# Patient Record
Sex: FEMALE | Race: WHITE | Employment: OTHER | URBAN - METROPOLITAN AREA
[De-identification: names, ages, dates, MRNs, and addresses within clinical notes are randomized per-mention and may not be internally consistent; named-entity substitution may affect disease eponyms.]

---

## 2017-01-18 ENCOUNTER — OFFICE VISIT (OUTPATIENT)
Dept: ORTHOPEDIC SURGERY | Age: 77
End: 2017-01-18

## 2017-01-18 VITALS
WEIGHT: 114 LBS | HEIGHT: 62 IN | DIASTOLIC BLOOD PRESSURE: 75 MMHG | TEMPERATURE: 98.4 F | SYSTOLIC BLOOD PRESSURE: 160 MMHG | OXYGEN SATURATION: 98 % | RESPIRATION RATE: 14 BRPM | HEART RATE: 85 BPM | BODY MASS INDEX: 20.98 KG/M2

## 2017-01-18 DIAGNOSIS — M47.816 LUMBAR FACET ARTHROPATHY: ICD-10-CM

## 2017-01-18 DIAGNOSIS — Z90.5 S/P NEPHRECTOMY: ICD-10-CM

## 2017-01-18 DIAGNOSIS — G89.29 OTHER CHRONIC PAIN: Primary | ICD-10-CM

## 2017-01-18 DIAGNOSIS — M51.26 HNP (HERNIATED NUCLEUS PULPOSUS), LUMBAR: ICD-10-CM

## 2017-01-18 RX ORDER — ACETAMINOPHEN AND CODEINE PHOSPHATE 300; 30 MG/1; MG/1
1 TABLET ORAL
Qty: 60 TAB | Refills: 2 | Status: SHIPPED | OUTPATIENT
Start: 2017-01-18 | End: 2017-04-17 | Stop reason: SDUPTHER

## 2017-01-18 NOTE — PROGRESS NOTES
[facility]  [address], [facility] [address]  Phone: [phone]  Fax: [phone]          HISTORY OF PRESENT ILLNESS:  Juanita Jaimes is a 78 y.o. female with history of chronic lumbar pain. She c/o increased lower back pain over the past couple days and thinks it is associated with the change in weather. Pt has been prescribed Tylenol #3 and takes at least 1 tab daily. She very occasionally takes Tylenol #3 2 tabs daily when her pain is severe. Pt admits to constipation when taking her pain medication and reports that she uses a daily stool softener with benefit. She admits that she has been losing weight (She has lost 3 lbs since her last office visit) but states that she eats until she is full. Pt has never tried Ensure or Boost. Pt at this time desires to continue with current care. Pain Scale: 4/10    PCP: Do Reis MD       Past Medical History   Diagnosis Date    Anemia NEC     Aneurysm ([address]      left kidney    Arthritis     Asthma     Carotid duplex 01/12/2016     Mild < 50% bilateral ICA stenosis.  Chest tightness      exertional, in the setting of widely patent coronary arteries, possibly related to elevated left ventricular end-diastolic pressure or possibly to small-vessel disease, improved on Cardizem CD in place of Verapamil    Chronic kidney disease, stage III (moderate)     Essential hypertension     Glaucoma     Heart murmur     History of echocardiogram 01/19/2015     EF 55-60%. No RWMA. Gr 1 DDfx. Mild-mod AS (mean grad 17). AS is new since study of 2/27/14.  Hypercholesterolemia     Hypoglycemia     Normal myocardial perfusion study 02/10/2015     No evidence of ischemia or prior infarction. Hyperdynamic LV fx.  EF >70%. No RWMA. Nondiagnostic EKG on pharm stress test.  Low risk.     Raynaud's syndrome     Renal cell carcinoma (HCC)      Stage T1a [place]hman Grade 2 s/p right open partial nephrectomy 2/22/10      S/P cardiac catheterization 02/23/2015     R dominant. LM patent. mLAD 20%. CX patent. mRCA 20%. LVEDP 8.  EF 60%. Mild AS. RA 3/1. PA 24. W 6/5.  CI 2.94.    S/p nephrectomy 2/22/10     right    Scoliosis     Stroke [facility]) 2/2015     resid los of balance        Social History     Social History    Marital status:      Spouse name: N/A    Number of children: N/A    Years of education: N/A     Occupational History    Not on file. Social History Main Topics    Smoking status: Never Smoker    Smokeless tobacco: Never Used    Alcohol use No    Drug use: No    Sexual activity: Yes     Partners: Male     Other Topics Concern    Not on file     Social History Narrative       Current Outpatient Prescriptions   Medication Sig Dispense Refill    acetaminophen-codeine (TYLENOL #3) 300-30 mg per tablet Take 1 Tab by mouth two (2) times daily as needed for Pain. Max Daily Amount: 2 Tabs. Indications: PAIN 60 Tab 2    losartan (COZAAR) 100 mg tablet Take 100 mg by mouth daily.  aspirin delayed-release 81 mg tablet Take  by mouth daily.  meloxicam (MOBIC) 15 mg tablet       LINACLOTIDE (LINZESS PO) Take  by mouth.  atorvastatin (LIPITOR) 80 mg tablet Take 1 Tab by mouth daily. 90 Tab 3    Cholecalciferol, Vitamin D3, 50,000 unit cap Take 1 Cap by mouth every seven (7) days.  alendronate (FOSAMAX) 35 mg tablet Take 35 mg by mouth every seven (7) days.  nitroglycerin (NITROLINGUAL) 400 mcg/spray spray 1 spray by SubLINGual route every five (5) minutes as needed. 1 Bottle 1    mometasone-formoterol (DULERA) 100-5 mcg/actuation HFA inhaler Take 2 Puffs by inhalation two (2) times a day.  nitroglycerin (NITROBID) 2 % ointment Apply 1 Inch to affected area as needed.  FLUNISOLIDE (AEROBID IN) Take 1 Puff by inhalation daily.  LACTOBACILLUS ACIDOPHILUS (PROBIOTIC PO) Take 1 Tab by mouth daily.       PROPYLENE GLYCOL/ (SYSTANE OP) Apply  to eye as needed.  ipratropium (ATROVENT) 0.03 % nasal spray 2 Sprays by Both Nostrils route every twelve (12) hours.  fluticasone (FLONASE) 50 mcg/actuation nasal spray 2 Sprays by Both Nostrils route nightly.  albuterol (PROVENTIL HFA, VENTOLIN HFA) 90 mcg/actuation inhaler Take 2 Puffs by inhalation as needed.  Azelastine (ASTEPRO) 0.15 % (205.5 mcg) nasal spray 1 [place] by Both Nostrils route two (2) times a day.  CYANOCOBALAMIN, VITAMIN B-12, (VITAMIN B-12 IJ) 1,000 mcg by Injection route every thirty (30) days.  cetirizine (ZYRTEC) 10 mg tablet Take 10 mg by mouth as needed. Allergies   Allergen Reactions    Vicodin [Hydrocodone-Acetaminophen] Nausea and Vomiting         REVIEW OF SYSTEMS    Constitutional: Positive for 3 lb weight loss. Negative for fever or chills. Respiratory: Negative for cough or shortness of breath. Cardiovascular: Negative for chest pain or palpitations. Gastrointestinal: Positive for constipation. Negative for acid reflux or change in bowel habits. Genitourinary: Negative for dysuria and flank pain. Musculoskeletal: Positive for lumbar pain. Skin: Negative for rash. Neurological: Negative for headaches, dizziness, or numbness. Endo/Heme/Allergies: Negative for increased bruising. Psychiatric/Behavioral: Negative for difficulty with sleep. PHYSICAL EXAMINATION  Visit Vitals    /75 (BP 1 Location: Right arm, BP Patient Position: Sitting)    Pulse 85    Temp 98.4 °F (36.9 °C) (Oral)    Resp 14    Ht 5' 2\" (1.575 m)    Wt 114 lb (51.7 kg)    SpO2 98%    BMI 20.85 kg/m2       Constitutional: Awake, alert, and in no acute distress  Neurological: 1+ symmetrical DTRs in the lower extremities. Sensation to light touch is intact. Skin: warm, dry, and intact. Musculoskeletal: Tenderness to palpation in the lower lumbar region. Moderate pain with extension and axial loading.  No pain with internal or external rotation of her hips. Negative straight leg raise bilaterally. Hip Flex  Quads Hamstrings Ankle DF EHL Ankle PF   Right +4/5 +4/5 +4/5 +4/5 +4/5 +4/5   Left +4/5 +4/5 +4/5 +4/5 +4/5 +4/5     IMAGING:    Lumbar Spine MRI from 03/16/2016 was personally reviewed with the Pt and demonstrated:    Results from [place] encounter on 03/16/16   MRI LUMB SPINE W WO CONT   Narrative MRI lumbar spine with and without contrast    COMPARISON: CT chest abdomen and pelvis October 6, 2011    CLINICAL INFORMATION: Progressive back pain, left leg pain. PROCEDURE:    MRI of the lumbar spine was performed prior to and following the uneventful  administration of 11 cc of Magnevist venous leak. Sequences included: Localizer,  sagittal T1, sagittal T1 postcontrast with fat saturation, sagittal inversion  recovery, sagittal T2, axial T1, axial T1 postcontrast, and axial T2    FINDINGS:  There is mild apex right scoliosis of the lumbar spine, with mild apex left  scoliosis of the thoracolumbar junction. 4 mm of grade 1 anterior listhesis of  L4 on L5. Vertebral body heights are preserved. No fracture. Minimal multilevel  small osteophyte formation. Mild disc narrowing and desiccation throughout the lumbar spine. Conus terminates at the L1-2 level. Mild degenerative changes present in the sacroiliac joints. Patient has  undergone previous partial left nephrectomy. High T2 signal foci in the kidneys,  incompletely evaluated on this study. High T2 signal focus in the posterior  right hepatic lobe is also possibly a cyst, but incompletely evaluated. 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 Correlation of axial and sagittal images reveals the following:    At L1-L2: No significant disc pathology or proliferative changes. No central  canal or foraminal stenosis. At L2-L3: Mild bulging of the posterolateral disc corners, with mild facet  arthropathy and ligamentous buckling. Trace facet joint effusions.  Canal is  patent. Mild right greater than left foraminal narrowing. At L3-L4: Mild bulging of the posterolateral disc corners, with mild facet  arthropathy and ligamentous buckling. Trace right facet joint effusion. Canal is  patent. Mild bilateral foraminal narrowing. At L4-L5: Uncovering of the disc due to the listhesis, with diffuse disc bulge. Mild to moderate facet arthropathy. Canal is patent. Susceptibility artifact in  the posterior soft tissues. Mild bilateral foraminal narrowing. Question prior  left hemilaminectomy. At L5-S1: Small posterior disc bulge, with mild facet arthropathy. Canal is  patent. No significant foraminal narrowing. Impression IMPRESSION:    Thoracolumbar scoliosis. Grade 1 anterior listhesis of L4 on L5. Multilevel  degenerative changes, with overall patent canal. Multilevel mild foraminal  narrowing. Question prior left hemilaminectomy at L4. High T2 signal foci in the kidneys, incompletely evaluated on this study. High  T2 signal focus in the posterior right hepatic lobe is also possibly a cyst, but  incompletely evaluated. Courtney Olea was seen today for back pain. Diagnoses and all orders for this visit:    Other chronic pain  -     acetaminophen-codeine (TYLENOL #3) 300-30 mg per tablet; Take 1 Tab by mouth two (2) times daily as needed for Pain. Max Daily Amount: 2 Tabs. Indications: PAIN    Lumbar facet arthropathy (HCC)  -     acetaminophen-codeine (TYLENOL #3) 300-30 mg per tablet; Take 1 Tab by mouth two (2) times daily as needed for Pain. Max Daily Amount: 2 Tabs. Indications: PAIN    HNP (herniated nucleus pulposus), lumbar    Chronic kidney disease, stage III (moderate)    S/p right nephrectomy for cancer in 1/10         IMPRESSION AND PLAN:  Sigmund Kawasaki is a 78 y.o. female with history of chronic lumbar pain.  She c/o increased lower back pain over the past couple days and thinks it is associated with the change in weather. Pt has been prescribed Tylenol #3 and takes 1-2 tabs daily, as her pain warrants. 1) Pt was given information on lumbar arthritis exercises. 2) She received a refill of Tylenol #3 1 tab BID prn pain. 3) Ms. Gregorio Cottrell has a reminder for a \"due or due soon\" health maintenance. I have asked that she contact her primary care provider, Roel Johnson MD,  for follow-up on this health maintenance. 4)  reviewed. 5) Last UDS from 10/19/2016 was consistent. 6) Pt will follow-up in 3 months.       Written by Armond Maxwell, as dictated by Mina Garcia MD.

## 2017-02-22 ENCOUNTER — OFFICE VISIT (OUTPATIENT)
Dept: CARDIOLOGY CLINIC | Age: 77
End: 2017-02-22

## 2017-02-22 VITALS
HEIGHT: 62 IN | HEART RATE: 72 BPM | OXYGEN SATURATION: 95 % | WEIGHT: 116 LBS | SYSTOLIC BLOOD PRESSURE: 104 MMHG | BODY MASS INDEX: 21.35 KG/M2 | DIASTOLIC BLOOD PRESSURE: 62 MMHG

## 2017-02-22 DIAGNOSIS — E78.5 DYSLIPIDEMIA: ICD-10-CM

## 2017-02-22 DIAGNOSIS — I35.0 AORTIC VALVE STENOSIS, UNSPECIFIED ETIOLOGY: Primary | Chronic | ICD-10-CM

## 2017-02-22 DIAGNOSIS — I10 ESSENTIAL HYPERTENSION: ICD-10-CM

## 2017-02-22 NOTE — PROGRESS NOTES
Review of Systems   Constitutional: Positive for malaise/fatigue. Negative for chills, diaphoresis, fever and weight loss. Respiratory: Positive for cough. Negative for hemoptysis, sputum production, shortness of breath and wheezing. Cardiovascular: Positive for chest pain. Negative for palpitations, orthopnea, claudication, leg swelling and PND. Gastrointestinal: Positive for constipation. Negative for abdominal pain, blood in stool, diarrhea, heartburn, melena, nausea and vomiting. Musculoskeletal: Positive for back pain, falls and joint pain. Negative for myalgias and neck pain. Neurological: Positive for weakness.

## 2017-02-22 NOTE — PROGRESS NOTES
1. Have you been to the ER, urgent care clinic since your last visit? Hospitalized since your last visit? No     2. Have you seen or consulted any other health care providers outside of the [facility] [place] since your last visit? Include any pap smears or colon screening.  No

## 2017-02-22 NOTE — MR AVS SNAPSHOT
Visit Information Date & Time Provider Department Dept. Phone Encounter #  
 2/22/2017  8:20 AM Yeny Centeno DO [facility] [id number] Follow-up Instructions Return in about 6 months (around 8/22/2017), or if symptoms worsen or fail to improve. Routing History Follow-up and Disposition History Your Appointments 4/17/2017  8:15 AM  
Follow Up with Janae Saavedra MD  
[facility] ONE [address] [place] [address] Road) Appt Note: 3 month  back fu  no copay [address] [place] [address]  
[address]  
  
    
 10/16/2017 10:00 AM  
ULTRASOUND with [facility] ULTRASOUND [facility]. [facility] [address] ([address] [place] [address]) Appt Note: iva rwg  
 [address] [facility] [address] [facility] [phone] [phone]  
  
    
 10/20/2017  9:30 AM  
ESTABLISHED PATIENT with JESSI Hicks [facility] ([address] [place] [address] Road) Appt Note: Return in about 1 year (around 10/19/2017) for FU with prior renal US with [facility]. [address] [facility]  
  
   
 [address] [facility], [address] [place] [address] Upcoming Health Maintenance Date Due DTaP/Tdap/Td series (1 - Tdap) 2/21/1963 FOBT Q 1 YEAR AGE 50-75 2/21/1992 ZOSTER VACCINE AGE 60> 2/21/2002 GLAUCOMA SCREENING Q2Y 2/21/2007 Pneumococcal 65+ High/Highest Risk (1 of 2 - PCV13) 2/21/2007 MEDICARE YEARLY EXAM 2/21/2007 INFLUENZA AGE 9 TO ADULT 8/1/2016 Allergies as of 2/22/2017  Review Complete On: 2/22/2017 By: Yeny Centeno DO Severity Noted Reaction Type Reactions Vicodin [Hydrocodone-acetaminophen]  09/15/2015    Nausea and Vomiting Current Immunizations  Reviewed on 2/22/2015 No immunizations on file. Not reviewed this visit You Were Diagnosed With   
  
 Codes Comments Aortic valve stenosis, unspecified etiology    -  Primary ICD-10-CM: I35.0 ICD-9-CM: 424.1 Essential hypertension     ICD-10-CM: I10 
ICD-9-CM: 401.9 Dyslipidemia     ICD-10-CM: E78.5 ICD-9-CM: 272.4 Vitals BP  
  
  
  
  
  
 104/62 Vitals History BMI and BSA Data Body Mass Index Body Surface Area  
 21.22 kg/m 2 1.52 m 2 Preferred Pharmacy Pharmacy Name Phone [facility] [id number] - [facility] [address] AT [address] [phone] Your Updated Medication List  
  
   
This list is accurate as of: 2/22/17  9:14 AM.  Always use your most recent med list.  
  
  
  
  
 acetaminophen-codeine 300-30 mg per tablet Commonly known as:  TYLENOL #3 Take 1 Tab by mouth two (2) times daily as needed for Pain. Max Daily Amount: 2 Tabs. Indications: PAIN  
  
 AEROBID IN Take 1 Puff by inhalation daily. albuterol 90 mcg/actuation inhaler Commonly known as:  PROVENTIL HFA, VENTOLIN HFA, PROAIR HFA Take 2 Puffs by inhalation as needed. alendronate 35 mg tablet Commonly known as:  FOSAMAX Take 35 mg by mouth every seven (7) days. aspirin delayed-release 81 mg tablet Take  by mouth daily. ASTEPRO 0.15 % (205.5 mcg) nasal spray Generic drug:  Azelastine 1 Spray by Both Nostrils route two (2) times a day. atorvastatin 80 mg tablet Commonly known as:  LIPITOR Take 1 Tab by mouth daily. Cholecalciferol (Vitamin D3) 50,000 unit Cap Take 1 Cap by mouth every seven (7) days. DULERA 100-5 mcg/actuation HFA inhaler Generic drug:  mometasone-formoterol Take 2 Puffs by inhalation two (2) times a day. FLONASE 50 mcg/actuation nasal spray Generic drug:  fluticasone 2 Sprays by Both Nostrils route nightly. ipratropium 0.03 % nasal spray Commonly known as:  ATROVENT  
 2 Sprays by Both Nostrils route every twelve (12) hours. LINZESS PO Take  by mouth.  
  
 losartan 100 mg tablet Commonly known as:  COZAAR Take 100 mg by mouth daily. meloxicam 15 mg tablet Commonly known as:  MOBIC * nitroglycerin 2 % ointment Commonly known as:  NITROBID Apply 1 Inch to affected area as needed. * nitroglycerin 400 mcg/spray spray Commonly known as:  NITROLINGUAL  
1 spray by SubLINGual route every five (5) minutes as needed. PROBIOTIC PO Take 1 Tab by mouth daily. SYSTANE OP Apply  to eye as needed. VITAMIN B-12 INJECTION  
1,000 mcg by Injection route every thirty (30) days. ZyrTEC 10 mg tablet Generic drug:  cetirizine Take 10 mg by mouth as needed. * Notice: This list has 2 medication(s) that are the same as other medications prescribed for you. Read the directions carefully, and ask your doctor or other care provider to review them with you. We Performed the Following AMB POC EKG ROUTINE W/ 12 LEADS, INTER & REP [68239 CPT(R)] Follow-up Instructions Return in about 6 months (around 8/22/2017), or if symptoms worsen or fail to improve. To-Do List   
 07/03/2017 ECHO:  2D ECHO COMPLETE ADULT (TTE) W OR WO CONTR   
  
 07/03/2017 9:00 AM  
  Appointment with HBV- [id number] MACHINE (WT ) at [facility] NON-INVASIVE CARD ([phone]) Age Limit for ALL Heart procedures @ all [facility] facilities: 18 yrs and older only. Under the age of 25, refer to 5 [facility] ([phone]). Wt Limit: 350lbs. This study requires patient to bring a written physician's order or MD office may fax the order to Central Scheduling at [phone]. Patient needs to bring a current list of all medications. No preparation is required for this study. Patients should report 15 minutes prior to their appointment time to the [facility], [address] [place] [address]/[address]. Please provide this summary of care documentation to your next provider. Your primary care clinician is listed as NUVIA RODRIGUEZ. If you have any questions after today's visit, please call [phone].

## 2017-02-22 NOTE — PROGRESS NOTES
HPI: I saw Salma Mcelroy in my office today in cardiovascular evaluation regarding her aortic stenosis and hypertensive heart disease. Ms. Estefanía Lazo is a pleasant 78year old white female with history of chest tightness on exertion and some shortness of breath on exertion in the past, which ultimately prompted a cardiac catheterization in February of 2015, even though her nuclear myocardial perfusion study prior to that appeared to be normal. Her subsequent cardiac catheterization completed on 2/23/15 by my associate Dr. Orlando Benavides demonstrated only mild coronary artery disease with mild aortic stenosis with a peak systolic gradient of 21 mmHg across the valve and a valve area of 1.25 cm squared. When we did an echocardiogram in July 2016 her mean gradient across aortic valve was 34 mmHg suggesting moderate aortic stenosis. She went in to the hospital on 2/19/15 with left sided weakness and an MRI of the brain was negative for an acute infarct at that time, however there was evidence of chronic lacunar infarcts in the deep brain matter extending into the left lentiform to the left cauda area, and there was also increased T2 flare syndrome in the periventricular white matter consistent with mild to moderate chronic microvascular ischemic disease. She comes in today and relates that she is doing reasonably well. She has occasional chest pain which doesn't sound anginal, but denies shortness of breath with exertion or any exertional presyncope or syncope or any other CV complaints. She has lost 45-50 pounds since her partial right nephrectomy for cancer back in 2010. However, in general she relates that she is doing reasonably well and is staying fairly active. Encounter Diagnoses   Name Primary?  Aortic valve stenosis, moderate  Yes    Essential hypertension     Dyslipidemia         Discussion:  This lady appears to be doing about as well as we could expect and really have no recommendations for change to time.  Her second heart sound continues to be well preserved and I do not feel that her aortic stenosis has gotten significantly worse. I will plan to repeat her echocardiogram but I am going to do that in July and then plan to see her again in August of 2017. She does have history of hypercholesterolemia which has been managed through Dr. Ramirez Mas office for which she is on Lipitor 80 mg daily, but her HDLs always been quite high and her numbers have been reasonable over the years so I am simply leave management of that problem Dr. Iam Yarbrough. Her blood pressure is on the low side of normal and her EKG appears to be stable so I am simply get a plan to see her again in several months or as needed if any new cardiovascular symptoms surface in the interim. PCP:  Luis La MD       Past Medical History:   Diagnosis Date    Anemia NEC     Aneurysm ([address]     left kidney    Arthritis     Asthma     Cardiac catheterization 02/23/2015    R dominant. LM patent. mLAD 20%. CX patent. mRCA 20%. LVEDP 8.  EF 60%. Mild AS. RA 3/1. PA 24. W 6/5.  CI 2.94.    Cardiac echocardiogram 07/27/2016    EF 60-65%. No WMA. Mild LVH. Normal LV diastolic fx. Mild LAE. Mod-severe AS (mean grad 34 mmHg). AS has worsened since study of 1/20/15. Mild MR.  Cardiac nuclear imaging test 02/10/2015    No evidence of ischemia or prior infarction. Hyperdynamic LV fx.  EF >70%. No RWMA. Nondiagnostic EKG on pharm stress test.  Low risk.  Carotid duplex 01/12/2016    Mild < 50% bilateral ICA stenosis.       Chest tightness     exertional, in the setting of widely patent coronary arteries, possibly related to elevated left ventricular end-diastolic pressure or possibly to small-vessel disease, improved on Cardizem CD in place of Verapamil    Chronic kidney disease, stage III (moderate)     Essential hypertension     Glaucoma     Heart murmur     Hypercholesterolemia     Hypoglycemia     Raynaud's syndrome     Renal cell carcinoma (HCC)     Stage T1a Furhman Grade 2 s/p right open partial nephrectomy 2/22/10      S/p nephrectomy 2/22/10    right    Scoliosis     Stroke [facility]) 2/2015    resid los of balance         Past Surgical History:   Procedure Laterality Date    COLONOSCOPY N/A 10/10/2016    COLONOSCOPY performed by Sary Briggs MD at [facility] ENDOSCOPY    HX CYST REMOVAL      on spine    HX GYN      partial hysterectomy 1988, on Premarin until a few years ago    HX HEART CATHETERIZATION  3/18/09    Low left-sided and right-sided filling pressures (likely related to overnight fasting). Normal resting pulmonary  arterial pressure. Normal systemic pressures. Borderline left ventricular end-diastolic pressure at rest. Mild incerease in pulmonary artery pressure and pulmonary capillary wedge pressure with exercise. Mild, hemodynamically nonsignificant epicardial coronary artery disease.  HX NEPHRECTOMY  2/22/10    s/p partial right nephrectomy for right kidney lower pole mass secondary to early stage cancer    HX OTHER SURGICAL      Ovary removal    HX TRABECULECTOMY           Current Outpatient Rx   Name  Route  Sig  Dispense  Refill    losartan (COZAAR) 100 mg tablet    Oral    Take 100 mg by mouth daily.  aspirin delayed-release 81 mg tablet    Oral    Take  by mouth daily.  meloxicam (MOBIC) 15 mg tablet                      LINACLOTIDE (LINZESS PO)    Oral    Take  by mouth.  atorvastatin (LIPITOR) 80 mg tablet    Oral    Take 1 Tab by mouth daily. 90 Tab    3      Cholecalciferol, Vitamin D3, 50,000 unit cap    Oral    Take 1 Cap by mouth every seven (7) days.  alendronate (FOSAMAX) 35 mg tablet    Oral    Take 35 mg by mouth every seven (7) days.  nitroglycerin (NITROLINGUAL) 400 mcg/spray spray    SubLINGual    1 spray by SubLINGual route every five (5) minutes as needed.     1 Bottle    1      mometasone-formoterol (DULERA) 100-5 mcg/actuation HFA inhaler    Inhalation    Take 2 Puffs by inhalation two (2) times a day.  nitroglycerin (NITROBID) 2 % ointment    Topical    Apply 1 Inch to affected area as needed.  FLUNISOLIDE (AEROBID IN)    Inhalation    Take 1 Puff by inhalation daily.  LACTOBACILLUS ACIDOPHILUS (PROBIOTIC PO)    Oral    Take 1 Tab by mouth daily.  PROPYLENE GLYCOL/ (SYSTANE OP)    Ophthalmic    Apply  to eye as needed.  ipratropium (ATROVENT) 0.03 % nasal spray    Both Nostrils    2 Sprays by Both Nostrils route every twelve (12) hours.  fluticasone (FLONASE) 50 mcg/actuation nasal spray    Both Nostrils    2 Sprays by Both Nostrils route nightly.  albuterol (PROVENTIL HFA, VENTOLIN HFA) 90 mcg/actuation inhaler    Inhalation    Take 2 Puffs by inhalation as needed.  Azelastine (ASTEPRO) 0.15 % (205.5 mcg) nasal spray    Both Nostrils    1 Spray by Both Nostrils route two (2) times a day.  CYANOCOBALAMIN, VITAMIN B-12, (VITAMIN B-12 IJ)    Injection    1,000 mcg by Injection route every thirty (30) days.  cetirizine (ZYRTEC) 10 mg tablet    Oral    Take 10 mg by mouth as needed. Allergies   Allergen Reactions    Vicodin [Hydrocodone-Acetaminophen] Nausea and Vomiting       Social   Social History   Substance Use Topics    Smoking status: Never Smoker    Smokeless tobacco: Never Used    Alcohol use No         Family history: family history includes COPD in her mother; Cancer in her father; Heart Disease in her brother; Thyroid Disease in her mother. Review of Systems:   Constitutional: Positive for malaise/fatigue. Negative for chills, diaphoresis, fever and weight loss. Respiratory: Positive for cough. Negative for hemoptysis, sputum production, shortness of breath and wheezing. Cardiovascular: Positive for chest pain.  Negative for palpitations, orthopnea, claudication, leg swelling and PND. Gastrointestinal: Positive for constipation. Negative for abdominal pain, blood in stool, diarrhea, heartburn, melena, nausea and vomiting. Musculoskeletal: Positive for back pain, falls and joint pain. Negative for myalgias and neck pain. Neurological: Positive for weakness. Physical Exam:   The patient is an alert, oriented, well developed, well nourished 78 y.o. female who was in no acute distress at the time of my examination. Visit Vitals    /62    Pulse 72    Ht 5' 2\" (1.575 m)    Wt 52.6 kg (116 lb)    SpO2 95%    BMI 21.22 kg/m2        BP Readings from Last 3 Encounters:   02/22/17 104/62   01/18/17 160/75   10/19/16 120/72        Wt Readings from Last 3 Encounters:   02/22/17 52.6 kg (116 lb)   01/18/17 51.7 kg (114 lb)   10/19/16 53.1 kg (117 lb)       HEENT: Conjunctivae white, mucosa moist, no pallor or cyanosis. Neck: Supple without masses, tenderness or thyromegaly. No jugular venous distention. Carotid upstrokes are full bilaterally with bilateral bruits vs radiation of murmur to the neck. Cardiovascular: Chest is symmetrical with good excursion. [place] is not displaced. No lifts, heaves or thrills. S1 and S2 are normal, with a soft grade II/VI JENNY along the LSB, with radiation to the base and right neck, without appreciable diastolic murmur, rubs, clicks or gallops. Lungs: Clear to auscultation in all fields. Abdomen: Soft. No masses, tenderness or organomegaly. Extremities: No edema, with 1+ peripheral pulses. Review of Data: Please refer to past medical history for most recent cardiac testing.   Lab Results   Component Value Date/Time    Cholesterol, total 160 02/21/2015 02:02 AM    HDL Cholesterol 73 02/21/2015 02:02 AM    LDL, calculated 76.6 02/21/2015 02:02 AM    Triglyceride 52 02/21/2015 02:02 AM    CHOL/HDL Ratio 2.2 02/21/2015 02:02 AM       Results for orders placed or performed in visit on 02/22/17 AMB POC EKG ROUTINE W/ 12 LEADS, INTER & REP     Status: None    Narrative    Normal sinus rhythm rate 72. There is some baseline artifact and some mild nonspecific anterolateral ST-T changes this EKG is otherwise similar to the EKG of July 26, 2016. Joretta Favre, D.O., F.A.C.C. [facility]  [facility]  [address]. [address] [address] [address]    PLEASE NOTE:  This document has been produced using voice recognition software. Unrecognized errors in transcription may be present.

## 2017-04-17 ENCOUNTER — OFFICE VISIT (OUTPATIENT)
Dept: ORTHOPEDIC SURGERY | Age: 77
End: 2017-04-17

## 2017-04-17 VITALS
WEIGHT: 116.4 LBS | HEART RATE: 86 BPM | BODY MASS INDEX: 21.42 KG/M2 | OXYGEN SATURATION: 99 % | RESPIRATION RATE: 18 BRPM | TEMPERATURE: 98.1 F | HEIGHT: 62 IN | SYSTOLIC BLOOD PRESSURE: 140 MMHG | DIASTOLIC BLOOD PRESSURE: 86 MMHG

## 2017-04-17 DIAGNOSIS — Z51.81 THERAPEUTIC DRUG MONITORING: ICD-10-CM

## 2017-04-17 DIAGNOSIS — M62.830 MUSCLE SPASM OF BACK: ICD-10-CM

## 2017-04-17 DIAGNOSIS — G89.29 OTHER CHRONIC PAIN: Primary | ICD-10-CM

## 2017-04-17 DIAGNOSIS — M47.816 LUMBAR FACET ARTHROPATHY: ICD-10-CM

## 2017-04-17 DIAGNOSIS — M51.36 DDD (DEGENERATIVE DISC DISEASE), LUMBAR: ICD-10-CM

## 2017-04-17 DIAGNOSIS — G89.29 OTHER CHRONIC PAIN: ICD-10-CM

## 2017-04-17 RX ORDER — ACETAMINOPHEN AND CODEINE PHOSPHATE 300; 30 MG/1; MG/1
TABLET ORAL
Qty: 45 TAB | Refills: 2 | Status: SHIPPED | OUTPATIENT
Start: 2017-04-17 | End: 2017-07-07 | Stop reason: SDUPTHER

## 2017-04-17 NOTE — PATIENT INSTRUCTIONS
Low Back Arthritis: Exercises  Your Care Instructions  Here are some examples of typical rehabilitation exercises for your condition. Start each exercise slowly. Ease off the exercise if you start to have pain. Your doctor or physical therapist will tell you when you can start these exercises and which ones will work best for you. When you are not being active, find a comfortable position for rest. Some people are comfortable on the floor or a medium-firm bed with a small pillow under their head and another under their knees. Some people prefer to lie on their side with a pillow between their knees. Don't stay in one position for too long. Take short walks (10 to 20 minutes) every 2 to 3 hours. Avoid slopes, hills, and stairs until you feel better. Walk only distances you can manage without pain, especially leg pain. How to do the exercises  Pelvic tilt    1. Lie on your back with your knees bent. 2. \"Brace\" your stomach--tighten your muscles by pulling in and imagining your belly button moving toward your spine. 3. Press your lower back into the floor. You should feel your hips and pelvis rock back. 4. Hold for 6 seconds while breathing smoothly. 5. Relax and allow your pelvis and hips to rock forward. 6. Repeat 8 to 12 times. Back stretches    1. Get down on your hands and knees on the floor. 2. Relax your head and allow it to droop. Round your back up toward the ceiling until you feel a nice stretch in your upper, middle, and lower back. Hold this stretch for as long as it feels comfortable, or about 15 to 30 seconds. 3. Return to the starting position with a flat back while you are on your hands and knees. 4. Let your back sway by pressing your stomach toward the floor. Lift your buttocks toward the ceiling. 5. Hold this position for 15 to 30 seconds. 6. Repeat 2 to 4 times. Follow-up care is a key part of your treatment and safety.  Be sure to make and go to all appointments, and call your doctor if you are having problems. It's also a good idea to know your test results and keep a list of the medicines you take. Where can you learn more? Go to http://meena-juan r.info/. Enter M943 in the search box to learn more about \"Low Back Arthritis: Exercises. \"  Current as of: May 23, 2016  Content Version: 11.2  © 0268-3038 [employer]. Care instructions adapted under license by [employer] (which disclaims liability or warranty for this information). If you have questions about a medical condition or this instruction, always ask your healthcare professional. [place] [address] any warranty or liability for your use of this information.

## 2017-04-17 NOTE — PROGRESS NOTES
[facility]  [address], [facility] [address]  Phone: [phone]  Fax: [phone]      Lupe Queen was seen today for back pain. Diagnoses and all orders for this visit:    Other chronic pain  -     acetaminophen-codeine (TYLENOL #3) 300-30 mg per tablet; 1 -2 daily as needed for pain  Indications: Pain  -     DRUG SCREEN UR - W/ CONFIRM; Future    Therapeutic drug monitoring  -     acetaminophen-codeine (TYLENOL #3) 300-30 mg per tablet; 1 -2 daily as needed for pain  Indications: Pain  -     DRUG SCREEN UR - W/ CONFIRM; Future    Lumbar facet arthropathy (HCC)    Muscle spasm of back    DDD (degenerative disc disease), lumbar         IMPRESSION AND PLAN:  Shun Diaz is a 78 y.o. female with history of chronic lumbar pain. Pt generally takes Tylenol #3 1 tab daily, occasionally 2 as her pain warrants with benefit. She uses Mobic 15 mg intermittently for right hip bursitis    1) Pt was given information on lumbar arthritis exercises. 2) She received a refill Tylenol #3 1-2 tabs daily prn pain. 3) A UDS was obtained. 4) I recommended the Pt try water exercise. 5) Ms. Mame Jarvis has a reminder for a \"due or due soon\" health maintenance. I have asked that she contact her primary care provider, Tiffany Sims MD, for follow-up on this health maintenance. 6)  reviewed. 7) Last UDS from 10/19/2016 was consistent. 8) Pt will follow-up in 3 months. HISTORY OF PRESENT ILLNESS:  Shun Diaz is a 78 y.o. female with history of chronic lumbar pain. She admits to increased pain with rainy and cold weather. Pt has been prescribed Tylenol #3, generally taking 1 tab daily, occasionally 2 as her pain warrants, and reports relief when taking the medication. She uses Mobic 15 mg intermittently for right hip bursitis. She denies any side effects with the medication; it does allow her to be more functional during the day.   Pt at this time desires to continue with current care. Pain Scale: 4/10    PCP: Rik Finley MD       Past Medical History:   Diagnosis Date    Anemia NEC     Aneurysm ([address]     left kidney    Arthritis     Asthma     Cardiac catheterization 02/23/2015    R dominant. LM patent. mLAD 20%. CX patent. mRCA 20%. LVEDP 8.  EF 60%. Mild AS. RA 3/1. PA 24. W 6/5.  CI 2.94.    Cardiac echocardiogram 07/27/2016    EF 60-65%. No WMA. Mild LVH. Normal LV diastolic fx. Mild LAE. Mod-severe AS (mean grad 34 mmHg). AS has worsened since study of 1/20/15. Mild MR.  Cardiac nuclear imaging test 02/10/2015    No evidence of ischemia or prior infarction. Hyperdynamic LV fx.  EF >70%. No RWMA. Nondiagnostic EKG on pharm stress test.  Low risk.  Carotid duplex 01/12/2016    Mild < 50% bilateral ICA stenosis.  Chest tightness     exertional, in the setting of widely patent coronary arteries, possibly related to elevated left ventricular end-diastolic pressure or possibly to small-vessel disease, improved on Cardizem CD in place of Verapamil    Chronic kidney disease, stage III (moderate)     Essential hypertension     Glaucoma     Heart murmur     Hypercholesterolemia     Hypoglycemia     Raynaud's syndrome     Renal cell carcinoma (HCC)     Stage T1a Furhman Grade 2 s/p right open partial nephrectomy 2/22/10      S/p nephrectomy 2/22/10    right    Scoliosis     Stroke [facility]) 2/2015    resid los of balance        Social History     Social History    Marital status:      Spouse name: N/A    Number of children: N/A    Years of education: N/A     Occupational History    Not on file.      Social History Main Topics    Smoking status: Never Smoker    Smokeless tobacco: Never Used    Alcohol use No    Drug use: No    Sexual activity: Yes     Partners: Male     Other Topics Concern    Not on file     Social History Narrative       Current Outpatient Prescriptions   Medication Sig Dispense Refill    acetaminophen-codeine (TYLENOL #3) 300-30 mg per tablet 1 -2 daily as needed for pain  Indications: Pain 45 Tab 2    losartan (COZAAR) 100 mg tablet Take 100 mg by mouth daily.  aspirin delayed-release 81 mg tablet Take  by mouth daily.  meloxicam (MOBIC) 15 mg tablet       LINACLOTIDE (LINZESS PO) Take  by mouth.  atorvastatin (LIPITOR) 80 mg tablet Take 1 Tab by mouth daily. 90 Tab 3    Cholecalciferol, Vitamin D3, 50,000 unit cap Take 1 Cap by mouth every seven (7) days.  alendronate (FOSAMAX) 35 mg tablet Take 35 mg by mouth every seven (7) days.  nitroglycerin (NITROLINGUAL) 400 mcg/spray spray 1 spray by SubLINGual route every five (5) minutes as needed. 1 Bottle 1    mometasone-formoterol (DULERA) 100-5 mcg/actuation HFA inhaler Take 2 Puffs by inhalation two (2) times a day.  nitroglycerin (NITROBID) 2 % ointment Apply 1 Inch to affected area as needed.  FLUNISOLIDE (AEROBID IN) Take 1 Puff by inhalation daily.  LACTOBACILLUS ACIDOPHILUS (PROBIOTIC PO) Take 1 Tab by mouth daily.  PROPYLENE GLYCOL/ (SYSTANE OP) Apply  to eye as needed.  ipratropium (ATROVENT) 0.03 % nasal spray 2 Sprays by Both Nostrils route every twelve (12) hours.  fluticasone (FLONASE) 50 mcg/actuation nasal spray 2 Sprays by Both Nostrils route nightly.  albuterol (PROVENTIL HFA, VENTOLIN HFA) 90 mcg/actuation inhaler Take 2 Puffs by inhalation as needed.  Azelastine (ASTEPRO) 0.15 % (205.5 mcg) nasal spray 1 [place] by Both Nostrils route two (2) times a day.  CYANOCOBALAMIN, VITAMIN B-12, (VITAMIN B-12 IJ) 1,000 mcg by Injection route every thirty (30) days.  cetirizine (ZYRTEC) 10 mg tablet Take 10 mg by mouth as needed. Allergies   Allergen Reactions    Vicodin [Hydrocodone-Acetaminophen] Nausea and Vomiting         REVIEW OF SYSTEMS    Constitutional: Negative for fever, chills, or weight change.    Respiratory: Negative for cough or shortness of breath. Cardiovascular: Negative for chest pain or palpitations. Gastrointestinal: Negative for acid reflux, change in bowel habits, or constipation. Genitourinary: Negative for dysuria and flank pain. Musculoskeletal: Positive for lumbar pain. Skin: Negative for rash. Neurological: Negative for headaches, dizziness, or numbness. Endo/Heme/Allergies: Negative for increased bruising. Psychiatric/Behavioral: Negative for difficulty with sleep. PHYSICAL EXAMINATION  Visit Vitals    /86    Pulse 86    Temp 98.1 °F (36.7 °C) (Oral)    Resp 18    Ht 5' 2\" (1.575 m)    Wt 116 lb 6.4 oz (52.8 kg)    SpO2 99%    BMI 21.29 kg/m2       Constitutional: Awake, alert, and in no acute distress  Neurological: 1+ symmetrical DTRs in the lower extremities. Sensation to light touch is intact. Skin: warm, dry, and intact. Musculoskeletal: Tenderness to palpation in the lower lumbar region. Moderate pain with extension and axial loading. No pain with internal or external rotation of her hips. Negative straight leg raise bilaterally. Hip Flex  Quads Hamstrings Ankle DF EHL Ankle PF   Right +4/5 +4/5 +4/5 +4/5 +4/5 +4/5   Left +4/5 +4/5 +4/5 +4/5 +4/5 +4/5     IMAGING:    Lumbar spine MRI from 03/16/2016 was personally reviewed with the Pt and demonstrated:    Results from [place] encounter on 03/16/16   MRI LUMB SPINE W WO CONT   Narrative MRI lumbar spine with and without contrast    COMPARISON: CT chest abdomen and pelvis October 6, 2011    CLINICAL INFORMATION: Progressive back pain, left leg pain. PROCEDURE:    MRI of the lumbar spine was performed prior to and following the uneventful  administration of 11 cc of Magnevist venous leak.  Sequences included: Localizer,  sagittal T1, sagittal T1 postcontrast with fat saturation, sagittal inversion  recovery, sagittal T2, axial T1, axial T1 postcontrast, and axial T2    FINDINGS:  There is mild apex right scoliosis of the lumbar spine, with mild apex left  scoliosis of the thoracolumbar junction. 4 mm of grade 1 anterior listhesis of  L4 on L5. Vertebral body heights are preserved. No fracture. Minimal multilevel  small osteophyte formation. Mild disc narrowing and desiccation throughout the lumbar spine. Conus terminates at the L1-2 level. Mild degenerative changes present in the sacroiliac joints. Patient has  undergone previous partial left nephrectomy. High T2 signal foci in the kidneys,  incompletely evaluated on this study. High T2 signal focus in the posterior  right hepatic lobe is also possibly a cyst, but incompletely evaluated. 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 Correlation of axial and sagittal images reveals the following:    At L1-L2: No significant disc pathology or proliferative changes. No central  canal or foraminal stenosis. At L2-L3: Mild bulging of the posterolateral disc corners, with mild facet  arthropathy and ligamentous buckling. Trace facet joint effusions. Canal is  patent. Mild right greater than left foraminal narrowing. At L3-L4: Mild bulging of the posterolateral disc corners, with mild facet  arthropathy and ligamentous buckling. Trace right facet joint effusion. Canal is  patent. Mild bilateral foraminal narrowing. At L4-L5: Uncovering of the disc due to the listhesis, with diffuse disc bulge. Mild to moderate facet arthropathy. Canal is patent. Susceptibility artifact in  the posterior soft tissues. Mild bilateral foraminal narrowing. Question prior  left hemilaminectomy. At L5-S1: Small posterior disc bulge, with mild facet arthropathy. Canal is  patent. No significant foraminal narrowing. Impression IMPRESSION:    Thoracolumbar scoliosis. Grade 1 anterior listhesis of L4 on L5. Multilevel  degenerative changes, with overall patent canal. Multilevel mild foraminal  narrowing.     Question prior left hemilaminectomy at L4.    High T2 signal foci in the kidneys, incompletely evaluated on this study. High  T2 signal focus in the posterior right hepatic lobe is also possibly a cyst, but  incompletely evaluated. Written by Ebony Sheriff, as dictated by Linda Garcia MD.  I, Dr. Linda Garcia confirm that all documentation is accurate.

## 2017-05-27 ENCOUNTER — HOSPITAL ENCOUNTER (OUTPATIENT)
Dept: CT IMAGING | Age: 77
Discharge: HOME | End: 2017-05-27
Attending: FAMILY MEDICINE
Payer: MEDICARE

## 2017-05-27 DIAGNOSIS — I72.2 ANEURYSM OF RENAL ARTERY (HCC): ICD-10-CM

## 2017-05-27 LAB — CREAT UR-MCNC: 0.7 MG/DL (ref 0.6–1.3)

## 2017-05-27 PROCEDURE — 74174 CTA ABD&PLVS W/CONTRAST: CPT

## 2017-05-27 PROCEDURE — 82565 ASSAY OF CREATININE: CPT

## 2017-05-27 PROCEDURE — 74011636320 HC RX REV CODE- 636/320: Performed by: FAMILY MEDICINE

## 2017-05-27 RX ADMIN — IOPAMIDOL 100 ML: 612 INJECTION, SOLUTION INTRAVENOUS at 16:00

## 2017-07-03 ENCOUNTER — HOSPITAL ENCOUNTER (OUTPATIENT)
Dept: NON INVASIVE DIAGNOSTICS | Age: 77
Discharge: HOME | End: 2017-07-03
Attending: INTERNAL MEDICINE
Payer: MEDICARE

## 2017-07-03 DIAGNOSIS — I35.0 AORTIC VALVE STENOSIS, UNSPECIFIED ETIOLOGY: Chronic | ICD-10-CM

## 2017-07-03 DIAGNOSIS — I10 ESSENTIAL HYPERTENSION: ICD-10-CM

## 2017-07-03 PROCEDURE — 93306 TTE W/DOPPLER COMPLETE: CPT

## 2017-07-07 ENCOUNTER — OFFICE VISIT (OUTPATIENT)
Dept: ORTHOPEDIC SURGERY | Age: 77
End: 2017-07-07

## 2017-07-07 VITALS
HEART RATE: 78 BPM | RESPIRATION RATE: 18 BRPM | OXYGEN SATURATION: 100 % | DIASTOLIC BLOOD PRESSURE: 63 MMHG | BODY MASS INDEX: 21.57 KG/M2 | SYSTOLIC BLOOD PRESSURE: 146 MMHG | HEIGHT: 62 IN | TEMPERATURE: 98 F | WEIGHT: 117.2 LBS

## 2017-07-07 DIAGNOSIS — G89.29 OTHER CHRONIC PAIN: Primary | ICD-10-CM

## 2017-07-07 DIAGNOSIS — N18.30 CHRONIC KIDNEY DISEASE, STAGE III (MODERATE) (HCC): ICD-10-CM

## 2017-07-07 DIAGNOSIS — M47.816 LUMBAR FACET ARTHROPATHY: ICD-10-CM

## 2017-07-07 DIAGNOSIS — Z51.81 THERAPEUTIC DRUG MONITORING: ICD-10-CM

## 2017-07-07 RX ORDER — ACETAMINOPHEN AND CODEINE PHOSPHATE 300; 30 MG/1; MG/1
TABLET ORAL
Qty: 45 TAB | Refills: 2 | Status: SHIPPED | OUTPATIENT
Start: 2017-08-09 | End: 2017-10-11 | Stop reason: SDUPTHER

## 2017-07-07 NOTE — PROGRESS NOTES
Per your last note \" This lady appears to be doing about as well as we could expect and really have no recommendations for change to time. Her second heart sound continues to be well preserved and I do not feel that her aortic stenosis has gotten significantly worse. I will plan to repeat her echocardiogram but I am going to do that in July and then plan to see her again in August of 2017.

## 2017-07-07 NOTE — PATIENT INSTRUCTIONS
Low Back Arthritis: Exercises  Your Care Instructions  Here are some examples of typical rehabilitation exercises for your condition. Start each exercise slowly. Ease off the exercise if you start to have pain. Your doctor or physical therapist will tell you when you can start these exercises and which ones will work best for you. When you are not being active, find a comfortable position for rest. Some people are comfortable on the floor or a medium-firm bed with a small pillow under their head and another under their knees. Some people prefer to lie on their side with a pillow between their knees. Don't stay in one position for too long. Take short walks (10 to 20 minutes) every 2 to 3 hours. Avoid slopes, hills, and stairs until you feel better. Walk only distances you can manage without pain, especially leg pain. How to do the exercises  Pelvic tilt    1. Lie on your back with your knees bent. 2. \"Brace\" your stomachtighten your muscles by pulling in and imagining your belly button moving toward your spine. 3. Press your lower back into the floor. You should feel your hips and pelvis rock back. 4. Hold for 6 seconds while breathing smoothly. 5. Relax and allow your pelvis and hips to rock forward. 6. Repeat 8 to 12 times. Back stretches    1. Get down on your hands and knees on the floor. 2. Relax your head and allow it to droop. Round your back up toward the ceiling until you feel a nice stretch in your upper, middle, and lower back. Hold this stretch for as long as it feels comfortable, or about 15 to 30 seconds. 3. Return to the starting position with a flat back while you are on your hands and knees. 4. Let your back sway by pressing your stomach toward the floor. Lift your buttocks toward the ceiling. 5. Hold this position for 15 to 30 seconds. 6. Repeat 2 to 4 times. Follow-up care is a key part of your treatment and safety.  Be sure to make and go to all appointments, and call your doctor if you are having problems. It's also a good idea to know your test results and keep a list of the medicines you take. Where can you learn more? Go to http://meena-juan r.info/. Enter X582 in the search box to learn more about \"Low Back Arthritis: Exercises. \"  Current as of: March 21, 2017  Content Version: 11.3  © 9264-3165 [employer]. Care instructions adapted under license by [employer] (which disclaims liability or warranty for this information). If you have questions about a medical condition or this instruction, always ask your healthcare professional. [address] any warranty or liability for your use of this information.

## 2017-07-07 NOTE — MR AVS SNAPSHOT
Visit Information Date & Time Provider Department Dept. Phone Encounter #  
 7/7/2017  9:30 AM Elvis Briones, [address] [facility] [phone] [id number] Follow-up Instructions Return in about 3 months (around 10/7/2017) for Medication follow up. Routing History Your Appointments 9/19/2017  8:40 AM  
Follow Up with Laurent Hennessy DO [facility] ([facility]) Appt Note: 6 month follow up [place] [address] [address] [address] [phone] [address] [id number]  
  
    
 10/16/2017 10:00 AM  
[facility]ASOUND with [facility] ULTRASOUND [facility] ([facility]) Appt Note: iva rwg  
 [address] Chiara Cotto  
  
   
 [facility] [phone]  
  
    
 10/20/2017  9:30 AM  
ESTABLISHED PATIENT with JESSI Cooper [facility] ([facility]) Appt Note: Return in about 1 year (around 10/19/2017) for FU with prior renal US with [facility]. [address], [address]  
  
   
 [address] [facility], [address] [place] [id number] Upcoming Health Maintenance Date Due DTaP/Tdap/Td series (1 - Tdap) 2/21/1963 ZOSTER VACCINE AGE 60> 2/21/2002 GLAUCOMA SCREENING Q2Y 2/21/2007 Pneumococcal 65+ High/Highest Risk (1 of 2 - PCV13) 2/21/2007 MEDICARE YEARLY EXAM 2/21/2007 INFLUENZA AGE 9 TO ADULT 8/1/2017 Allergies as of 7/7/2017  Review Complete On: 7/7/2017 By: Elvis Briones MD  
  
 Severity Noted Reaction Type Reactions Vicodin [Hydrocodone-acetaminophen]  09/15/2015    Nausea and Vomiting Current Immunizations  Reviewed on 2/22/2015 No immunizations on file. Not reviewed this visit You Were Diagnosed With   
  
 Codes Comments Other chronic pain    -  Primary ICD-10-CM: G89.29 ICD-9-CM: 338.29 Therapeutic drug monitoring     ICD-10-CM: Z51.81 
ICD-9-CM: V58.83 Lumbar facet arthropathy (HCC)     ICD-10-CM: M12.88 ICD-9-CM: 770. 3 Chronic kidney disease, stage III (moderate)     ICD-10-CM: N18.3 ICD-9-CM: 556. 3 Vitals BP Pulse Temp Resp Height(growth percentile) Weight(growth percentile) 146/63 78 98 °F (36.7 °C) (Oral) 18 5' 2\" (1.575 m) 117 lb 3.2 oz (53.2 kg) SpO2 BMI OB Status Smoking Status 100% 21.44 kg/m2 Hysterectomy Never Smoker BMI and BSA Data Body Mass Index Body Surface Area  
 21.44 kg/m 2 1.53 m 2 Preferred Pharmacy Pharmacy Name Phone [phone] - [place], [address] [phone] Your Updated Medication List  
  
   
This list is accurate as of: 7/7/17 10:41 AM.  Always use your most recent med list.  
  
  
  
  
 acetaminophen-codeine 300-30 mg per tablet Commonly known as:  TYLENOL #3  
1 -2 daily as needed for pain  Indications: Pain Start taking on:  8/9/2017 AEROBID IN Take 1 Puff by inhalation daily. albuterol 90 mcg/actuation inhaler Commonly known as:  PROVENTIL HFA, VENTOLIN HFA, PROAIR HFA Take 2 Puffs by inhalation as needed. alendronate 35 mg tablet Commonly known as:  FOSAMAX Take 35 mg by mouth every seven (7) days. aspirin delayed-release 81 mg tablet Take  by mouth daily. ASTEPRO 0.15 % (205.5 mcg) nasal spray Generic drug:  Azelastine 1 Spray by Both Nostrils route two (2) times a day. atorvastatin 80 mg tablet Commonly known as:  LIPITOR Take 1 Tab by mouth daily. Cholecalciferol (Vitamin D3) 50,000 unit Cap Take 1 Cap by mouth every seven (7) days. DULERA 100-5 mcg/actuation HFA inhaler Generic drug:  mometasone-formoterol Take 2 Puffs by inhalation two (2) times a day. FLONASE 50 mcg/actuation nasal spray Generic drug:  fluticasone 2 Sprays by Both Nostrils route nightly. ipratropium 0.03 % nasal spray Commonly known as:  ATROVENT  
2 Sprays by Both Nostrils route every twelve (12) hours. LINZESS PO Take  by mouth.  
  
 losartan 100 mg tablet Commonly known as:  COZAAR Take 100 mg by mouth daily. meloxicam 15 mg tablet Commonly known as:  MOBIC * nitroglycerin 2 % ointment Commonly known as:  NITROBID Apply 1 Inch to affected area as needed. * nitroglycerin 400 mcg/spray spray Commonly known as:  NITROLINGUAL  
1 spray by SubLINGual route every five (5) minutes as needed. PROBIOTIC PO Take 1 Tab by mouth daily. SYSTANE OP Apply  to eye as needed. VITAMIN B-12 INJECTION  
1,000 mcg by Injection route every thirty (30) days. ZyrTEC 10 mg tablet Generic drug:  cetirizine Take 10 mg by mouth as needed. * Notice: This list has 2 medication(s) that are the same as other medications prescribed for you. Read the directions carefully, and ask your doctor or other care provider to review them with you. Prescriptions Printed Refills  
 acetaminophen-codeine (TYLENOL #3) 300-30 mg per tablet 2 Starting on: 2017 Si -2 daily as needed for pain  Indications: Pain Class: Print Follow-up Instructions Return in about 3 months (around 10/7/2017) for Medication follow up. Patient Instructions Low Back Arthritis: Exercises Your Care Instructions Here are some examples of typical rehabilitation exercises for your condition. Start each exercise slowly. Ease off the exercise if you start to have pain. Your doctor or physical therapist will tell you when you can start these exercises and which ones will work best for you.  
When you are not being active, find a comfortable position for rest. Some people are comfortable on the floor or a medium-firm bed with a small pillow under their head and another under their knees. Some people prefer to lie on their side with a pillow between their knees. Don't stay in one position for too long. Take short walks (10 to 20 minutes) every 2 to 3 hours. Avoid slopes, hills, and stairs until you feel better. Walk only distances you can manage without pain, especially leg pain. How to do the exercises Pelvic tilt 1. Lie on your back with your knees bent. 2. \"Brace\" your stomachtighten your muscles by pulling in and imagining your belly button moving toward your spine. 3. Press your lower back into the floor. You should feel your hips and pelvis rock back. 4. Hold for 6 seconds while breathing smoothly. 5. Relax and allow your pelvis and hips to rock forward. 6. Repeat 8 to 12 times. Back stretches 1. Get down on your hands and knees on the floor. 2. Relax your head and allow it to droop. Round your back up toward the ceiling until you feel a nice stretch in your upper, middle, and lower back. Hold this stretch for as long as it feels comfortable, or about 15 to 30 seconds. 3. Return to the starting position with a flat back while you are on your hands and knees. 4. Let your back sway by pressing your stomach toward the floor. Lift your buttocks toward the ceiling. 5. Hold this position for 15 to 30 seconds. 6. Repeat 2 to 4 times. Follow-up care is a key part of your treatment and safety. Be sure to make and go to all appointments, and call your doctor if you are having problems. It's also a good idea to know your test results and keep a list of the medicines you take. Where can you learn more? Go to http://meena-juan r.info/. Enter E271 in the search box to learn more about \"Low Back Arthritis: Exercises. \" Current as of: March 21, 2017 Content Version: 11.3 © 2891-5769 [employer].  Care instructions adapted under license by [address] (which disclaims liability or warranty for this information). If you have questions about a medical condition or this instruction, always ask your healthcare professional. [address] any warranty or liability for your use of this information. Please provide this summary of care documentation to your next provider. Your primary care clinician is listed as NUVIA RODRIGUEZ. If you have any questions after today's visit, please call [phone].

## 2017-07-07 NOTE — PROGRESS NOTES
[facility]  [address], [facility] [address]  Phone: [phone]  Fax: [phone]      Ollie Garcia was seen today for back pain. Diagnoses and all orders for this visit:    Other chronic pain  -     acetaminophen-codeine (TYLENOL #3) 300-30 mg per tablet; 1 -2 daily as needed for pain  Indications: Pain    Therapeutic drug monitoring  -     acetaminophen-codeine (TYLENOL #3) 300-30 mg per tablet; 1 -2 daily as needed for pain  Indications: Pain    Lumbar facet arthropathy (HCC)    Chronic kidney disease, stage III (moderate)         IMPRESSION AND PLAN:  Janet Darling is a 78 y.o. female with history of chronic lumbar pain. Pt repots that she is doing well at this time. Pt continues to take Tylenol #3 as her pain warrants. 1) Pt was given information on lumbar arthritis exercises. 2) She received a refill of Tylenol #3 1-2 tabs daily prn pain. 3) Ms. Yary Maddox has a reminder for a \"due or due soon\" health maintenance. I have asked that she contact her primary care provider, Moraima Whiteside MD, for follow-up on this health maintenance. 4)  demonstrated consistency with prescribing. 5) Last UDS from 04/17/2017 was consistent. 6) Pt will follow-up in 3 months. 7) Pt not a candidate for NSAIDs dur to renal disease    HISTORY OF PRESENT ILLNESS:  Janet Darling is a 78 y.o. female with history of chronic lumbar pain. Pt repots that she is doing well at this time. She admits to increased pain with damp/changing weather and with increased activity. Pt will be leaving tomorrow morning for the [place]. She continues to take Tylenol #3 as her pain warrants. Pt at this time desires to continue with current care. Pain Scale: 3/10    PCP: Moraima Whiteside MD       Past Medical History:   Diagnosis Date    Anemia NEC     Aneurysm ([facility] [address]     left kidney    Arthritis     Asthma     Cardiac catheterization 02/23/2015    R dominant. LM patent. mLAD 20%. CX patent. mRCA 20%. LVEDP 8.  EF 60%. Mild AS. RA 3/1. PA 24. W 6/5.  CI 2.94.    Cardiac echocardiogram 07/27/2016    EF 60-65%. No WMA. Mild LVH. Normal LV diastolic fx. Mild LAE. Mod-severe AS (mean grad 34 mmHg). AS has worsened since study of 1/20/15. Mild MR.  Cardiac nuclear imaging test 02/10/2015    No evidence of ischemia or prior infarction. Hyperdynamic LV fx.  EF >70%. No RWMA. Nondiagnostic EKG on pharm stress test.  Low risk.  Carotid duplex 01/12/2016    Mild < 50% bilateral ICA stenosis.  Chest tightness     exertional, in the setting of widely patent coronary arteries, possibly related to elevated left ventricular end-diastolic pressure or possibly to small-vessel disease, improved on Cardizem CD in place of Verapamil    Chronic kidney disease, stage III (moderate)     Essential hypertension     Glaucoma     Heart murmur     Hypercholesterolemia     Hypoglycemia     Raynaud's syndrome     Renal cell carcinoma (HCC)     Stage T1a Furhman Grade 2 s/p right open partial nephrectomy 2/22/10      S/p nephrectomy 2/22/10    right    Scoliosis     Stroke [facility]) 2/2015    resid los of balance        Social History     Social History    Marital status:      Spouse name: N/A    Number of children: N/A    Years of education: N/A     Occupational History    Not on file. Social History Main Topics    Smoking status: Never Smoker    Smokeless tobacco: Never Used    Alcohol use No    Drug use: No    Sexual activity: Yes     Partners: Male     Other Topics Concern    Not on file     Social History Narrative       Current Outpatient Prescriptions   Medication Sig Dispense Refill    [START ON 8/9/2017] acetaminophen-codeine (TYLENOL #3) 300-30 mg per tablet 1 -2 daily as needed for pain  Indications: Pain 45 Tab 2    losartan (COZAAR) 100 mg tablet Take 100 mg by mouth daily.  aspirin delayed-release 81 mg tablet Take  by mouth daily.  LINACLOTIDE (LINZESS PO) Take  by mouth.  atorvastatin (LIPITOR) 80 mg tablet Take 1 Tab by mouth daily. 90 Tab 3    Cholecalciferol, Vitamin D3, 50,000 unit cap Take 1 Cap by mouth every seven (7) days.  alendronate (FOSAMAX) 35 mg tablet Take 35 mg by mouth every seven (7) days.  nitroglycerin (NITROLINGUAL) 400 mcg/spray spray 1 spray by SubLINGual route every five (5) minutes as needed. 1 Bottle 1    nitroglycerin (NITROBID) 2 % ointment Apply 1 Inch to affected area as needed.  FLUNISOLIDE (AEROBID IN) Take 1 Puff by inhalation daily.  LACTOBACILLUS ACIDOPHILUS (PROBIOTIC PO) Take 1 Tab by mouth daily.  PROPYLENE GLYCOL/ (SYSTANE OP) Apply  to eye as needed.  ipratropium (ATROVENT) 0.03 % nasal spray 2 Sprays by Both Nostrils route every twelve (12) hours.  fluticasone (FLONASE) 50 mcg/actuation nasal spray 2 Sprays by Both Nostrils route nightly.  albuterol (PROVENTIL HFA, VENTOLIN HFA) 90 mcg/actuation inhaler Take 2 Puffs by inhalation as needed.  Azelastine (ASTEPRO) 0.15 % (205.5 mcg) nasal spray 1 [place] by Both Nostrils route two (2) times a day.  CYANOCOBALAMIN, VITAMIN B-12, (VITAMIN B-12 IJ) 1,000 mcg by Injection route every thirty (30) days.  cetirizine (ZYRTEC) 10 mg tablet Take 10 mg by mouth as needed.  meloxicam (MOBIC) 15 mg tablet       mometasone-formoterol (DULERA) 100-5 mcg/actuation HFA inhaler Take 2 Puffs by inhalation two (2) times a day. Allergies   Allergen Reactions    Vicodin [Hydrocodone-Acetaminophen] Nausea and Vomiting         REVIEW OF SYSTEMS    Constitutional: Negative for fever, chills, or weight change. Respiratory: Negative for cough or shortness of breath. Cardiovascular: Negative for chest pain or palpitations. Gastrointestinal: Negative for acid reflux, change in bowel habits, or constipation. Genitourinary: Negative for dysuria and flank pain.    Musculoskeletal: Positive for lumbar pain. Skin: Negative for rash. Neurological: Negative for headaches, dizziness, or numbness. Endo/Heme/Allergies: Negative for increased bruising. Psychiatric/Behavioral: Negative for difficulty with sleep. PHYSICAL EXAMINATION  Visit Vitals    /63    Pulse 78    Temp 98 °F (36.7 °C) (Oral)    Resp 18    Ht 5' 2\" (1.575 m)    Wt 117 lb 3.2 oz (53.2 kg)    SpO2 100%    BMI 21.44 kg/m2       Constitutional: Awake, alert, and in no acute distress  Neurological: 1+ symmetrical DTRs in the lower extremities. Sensation to light touch is intact. Skin: warm, dry, and intact. Musculoskeletal: Tenderness to palpation in the lower lumbar region. Moderate pain with extension and axial loading. No pain with internal or external rotation of her hips. Negative straight leg raise bilaterally. Hip Flex  Quads Hamstrings Ankle DF EHL Ankle PF   Right 4/5 4/5 4/5 4/5 4/5 4/5   Left 4/5 4/5 4/5 4/5 4/5 4/5     IMAGING:    Lumbar spine MRI from 03/16/2016 was personally reviewed with the Pt and demonstrated:    Results from [place] encounter on 03/16/16   MRI LUMB SPINE W WO CONT   Narrative MRI lumbar spine with and without contrast    COMPARISON: CT chest abdomen and pelvis October 6, 2011    CLINICAL INFORMATION: Progressive back pain, left leg pain. PROCEDURE:    MRI of the lumbar spine was performed prior to and following the uneventful  administration of 11 cc of Magnevist venous leak. Sequences included: Localizer,  sagittal T1, sagittal T1 postcontrast with fat saturation, sagittal inversion  recovery, sagittal T2, axial T1, axial T1 postcontrast, and axial T2    FINDINGS:  There is mild apex right scoliosis of the lumbar spine, with mild apex left  scoliosis of the thoracolumbar junction. 4 mm of grade 1 anterior listhesis of  L4 on L5. Vertebral body heights are preserved. No fracture. Minimal multilevel  small osteophyte formation.     Mild disc narrowing and desiccation throughout the lumbar spine. Conus terminates at the L1-2 level. Mild degenerative changes present in the sacroiliac joints. Patient has  undergone previous partial left nephrectomy. High T2 signal foci in the kidneys,  incompletely evaluated on this study. High T2 signal focus in the posterior  right hepatic lobe is also possibly a cyst, but incompletely evaluated. 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 Correlation of axial and sagittal images reveals the following:    At L1-L2: No significant disc pathology or proliferative changes. No central  canal or foraminal stenosis. At L2-L3: Mild bulging of the posterolateral disc corners, with mild facet  arthropathy and ligamentous buckling. Trace facet joint effusions. Canal is  patent. Mild right greater than left foraminal narrowing. At L3-L4: Mild bulging of the posterolateral disc corners, with mild facet  arthropathy and ligamentous buckling. Trace right facet joint effusion. Canal is  patent. Mild bilateral foraminal narrowing. At L4-L5: Uncovering of the disc due to the listhesis, with diffuse disc bulge. Mild to moderate facet arthropathy. Canal is patent. Susceptibility artifact in  the posterior soft tissues. Mild bilateral foraminal narrowing. Question prior  left hemilaminectomy. At L5-S1: Small posterior disc bulge, with mild facet arthropathy. Canal is  patent. No significant foraminal narrowing. Impression IMPRESSION:    Thoracolumbar scoliosis. Grade 1 anterior listhesis of L4 on L5. Multilevel  degenerative changes, with overall patent canal. Multilevel mild foraminal  narrowing. Question prior left hemilaminectomy at L4. High T2 signal foci in the kidneys, incompletely evaluated on this study. High  T2 signal focus in the posterior right hepatic lobe is also possibly a cyst, but  incompletely evaluated.            Written by Harrietta Leventhal, as dictated by Naheed Clark MD.  Dr. HUMERA Jerry Shows confirm that all documentation is accurate.

## 2017-07-24 ENCOUNTER — TELEPHONE (OUTPATIENT)
Dept: CARDIOLOGY CLINIC | Age: 77
End: 2017-07-24

## 2017-07-24 DIAGNOSIS — R06.02 SOB (SHORTNESS OF BREATH): Primary | ICD-10-CM

## 2017-07-24 DIAGNOSIS — R06.09 DOE (DYSPNEA ON EXERTION): ICD-10-CM

## 2017-07-24 NOTE — TELEPHONE ENCOUNTER
----- Message from Benna Rinne, RN sent at 7/7/2017 11:08 AM EDT -----  Per your last note \" This lady appears to be doing about as well as we could expect and really have no recommendations for change to time. Her second heart sound continues to be well preserved and I do not feel that her aortic stenosis has gotten significantly worse. I will plan to repeat her echocardiogram but I am going to do that in July and then plan to see her again in August of 2017.

## 2017-07-24 NOTE — TELEPHONE ENCOUNTER
I called and placed a message for the patient to call and ask for Cecile Loyola or Ana with regard to the results of her echocardiogram.  Her echocardiogram continues to demonstrate moderate aortic stenosis with a mean gradient of 28 mmHg across the valve and this actually looked somewhat less marked than it had back in 2016 when the mean gradient across the aortic valve was estimated at 34 mmHg. There was some suggestion at the gradient may have been underestimated and we may want to consider a limited echo with contrast to get a better Doppler envelope if she is having more symptoms. If she is doing fine I would simply plan to see her again on September 19, 2017 is currently scheduled, but if there is any increase in problems with shortness of breath with exertion, fatigue or chest tightness we can move her appointment up.  ES

## 2017-07-26 NOTE — TELEPHONE ENCOUNTER
Lvm - awaiting results from echocardiogram.      Patient returned call, she was informed of results of echocardiogram and advised if symptoms continue to have limited echo. Patient stated that she was still having some SOB, fatigue and left sided intermittent chest pain. Advised patient to go to E.D. If symptoms worsen or last longer than 30 minutes. She was also informed that Eric Erickson the  would be giving her a call to schedule limited echo. Patient verbalized understanding of instructions.

## 2017-07-28 ENCOUNTER — OFFICE VISIT (OUTPATIENT)
Dept: CARDIOLOGY CLINIC | Age: 77
End: 2017-07-28

## 2017-07-28 VITALS
HEIGHT: 62 IN | BODY MASS INDEX: 21.9 KG/M2 | DIASTOLIC BLOOD PRESSURE: 80 MMHG | SYSTOLIC BLOOD PRESSURE: 132 MMHG | WEIGHT: 119 LBS | OXYGEN SATURATION: 99 % | HEART RATE: 86 BPM

## 2017-07-28 DIAGNOSIS — E78.5 DYSLIPIDEMIA: ICD-10-CM

## 2017-07-28 DIAGNOSIS — I35.0 AORTIC VALVE STENOSIS, UNSPECIFIED ETIOLOGY: Primary | Chronic | ICD-10-CM

## 2017-07-28 DIAGNOSIS — I15.0 RENOVASCULAR HYPERTENSION: ICD-10-CM

## 2017-07-28 DIAGNOSIS — R00.2 PALPITATIONS: ICD-10-CM

## 2017-07-28 NOTE — PROGRESS NOTES
Review of Systems   Constitutional: Negative for chills, diaphoresis, fever, malaise/fatigue and weight loss. Respiratory: Negative. Cardiovascular: Positive for chest pain and palpitations. Negative for orthopnea, claudication, leg swelling and PND. Gastrointestinal: Positive for constipation. Negative for abdominal pain, blood in stool, diarrhea, heartburn, melena, nausea and vomiting. Musculoskeletal: Positive for back pain and falls. Negative for joint pain, myalgias and neck pain. Neurological: Positive for weakness.

## 2017-07-28 NOTE — MR AVS SNAPSHOT
Visit Information Date & Time Provider Department Dept. Phone Encounter #  
 7/28/2017  1:00 PM Nino Gutierrez, [address] [facility] [address] [id number] Follow-up Instructions Return in about 6 months (around 1/28/2018), or if symptoms worsen or fail to improve. Routing History Your Appointments 10/11/2017  8:00 AM  
Follow Up with Bhakti Lobo MD  
[facility] [address]) Appt Note: 3mo fu  
 [address] [place] [address]  
[address]  
  
    
 10/16/2017 10:00 AM  
ULTRASOUND with [facility] ULTRASOUND [facility]. [address] ([address]) Appt Note: iva rwg  
 [address] [facility] [id number]  
  
    
 10/20/2017  9:30 AM  
ESTABLISHED PATIENT with JESSI Mckinnon [facility] ([address]) Appt Note: Return in about 1 year (around 10/19/2017) for FU with prior renal US with [facility]. [address], [address]  
  
   
 [address], [address] [facility] [address] Upcoming Health Maintenance Date Due DTaP/Tdap/Td series (1 - Tdap) 2/21/1963 ZOSTER VACCINE AGE 60> 12/21/2001 GLAUCOMA SCREENING Q2Y 2/21/2007 Pneumococcal 65+ High/Highest Risk (1 of 2 - PCV13) 2/21/2007 MEDICARE YEARLY EXAM 2/21/2007 INFLUENZA AGE 9 TO ADULT 8/1/2017 Allergies as of 7/28/2017  Review Complete On: 7/28/2017 By: Nino Gutierrez, DO Severity Noted Reaction Type Reactions Vicodin [Hydrocodone-acetaminophen]  09/15/2015    Nausea and Vomiting Current Immunizations  Reviewed on 2/22/2015 No immunizations on file. Not reviewed this visit You Were Diagnosed With   
  
 Codes Comments Aortic valve stenosis, unspecified etiology    -  Primary ICD-10-CM: I35.0 ICD-9-CM: 424.1 Dyslipidemia     ICD-10-CM: E78.5 ICD-9-CM: 272.4 Renovascular hypertension     ICD-10-CM: I15.0 ICD-9-CM: 405.91 Palpitations     ICD-10-CM: R00.2 ICD-9-CM: 785.1 Vitals BP Pulse Height(growth percentile) Weight(growth percentile) SpO2 BMI  
 132/80 86 5' 2\" (1.575 m) 119 lb (54 kg) 99% 21.77 kg/m2 OB Status Smoking Status Hysterectomy Never Smoker Vitals History BMI and BSA Data Body Mass Index Body Surface Area 21.77 kg/m 2 1.54 m 2 Preferred Pharmacy Pharmacy Name [phone] - [address], [address] AT [address] [phone] Your Updated Medication List  
  
   
This list is accurate as of: 7/28/17  1:39 PM.  Always use your most recent med list.  
  
  
  
  
 acetaminophen-codeine 300-30 mg per tablet Commonly known as:  TYLENOL #3  
1 -2 daily as needed for pain  Indications: Pain Start taking on:  8/9/2017 AEROBID IN Take 1 Puff by inhalation daily. albuterol 90 mcg/actuation inhaler Commonly known as:  PROVENTIL HFA, VENTOLIN HFA, PROAIR HFA Take 2 Puffs by inhalation as needed. alendronate 35 mg tablet Commonly known as:  FOSAMAX Take 35 mg by mouth every seven (7) days. aspirin delayed-release 81 mg tablet Take  by mouth daily. ASTEPRO 0.15 % (205.5 mcg) nasal spray Generic drug:  Azelastine 1 Spray by Both Nostrils route two (2) times a day. atorvastatin 80 mg tablet Commonly known as:  LIPITOR Take 1 Tab by mouth daily. Cholecalciferol (Vitamin D3) 50,000 unit Cap Take 1 Cap by mouth every seven (7) days. DULERA 100-5 mcg/actuation HFA inhaler Generic drug:  mometasone-formoterol Take 2 Puffs by inhalation two (2) times a day. FLONASE 50 mcg/actuation nasal spray Generic drug:  fluticasone 2 Sprays by Both Nostrils route nightly. ipratropium 0.03 % nasal spray Commonly known as:  ATROVENT  
2 Sprays by Both Nostrils route every twelve (12) hours. LINZESS PO Take  by mouth.  
  
 losartan 100 mg tablet Commonly known as:  COZAAR Take 100 mg by mouth daily. meloxicam 15 mg tablet Commonly known as:  MOBIC * nitroglycerin 2 % ointment Commonly known as:  NITROBID Apply 1 Inch to affected area as needed. * nitroglycerin 400 mcg/spray spray Commonly known as:  NITROLINGUAL  
1 spray by SubLINGual route every five (5) minutes as needed. PROBIOTIC PO Take 1 Tab by mouth daily. SYSTANE OP Apply  to eye as needed. VITAMIN B-12 INJECTION  
1,000 mcg by Injection route every thirty (30) days. ZyrTEC 10 mg tablet Generic drug:  cetirizine Take 10 mg by mouth as needed. * Notice: This list has 2 medication(s) that are the same as other medications prescribed for you. Read the directions carefully, and ask your doctor or other care provider to review them with you. We Performed the Following AMB POC EKG ROUTINE W/ 12 LEADS, INTER & REP [96606 CPT(R)] Follow-up Instructions Return in about 6 months (around 1/28/2018), or if symptoms worsen or fail to improve. To-Do List   
 08/02/2017 12:30 PM  
  Appointment with [facility]- IE33 MACHINE (WT ) at [facility] NON-INVASIVE CARD ([phone]) Age Limit for ALL Heart procedures @ all [facility] facilities: 18 yrs and older only. Under the age of 25, refer to [facility] ([phone]). Wt Limit: 350lbs. This study requires patient to bring a written physician's order or MD office may fax the order to Central Scheduling at [phone]. Patient needs to bring a current list of all medications. No preparation is required for this study.   Patients should report 15 minutes prior to their appointment time to the [place], [address] [facility]. Please provide this summary of care documentation to your next provider. Your primary care clinician is listed as NUVIA RODRIGUEZ. If you have any questions after today's visit, please call [phone].

## 2017-07-28 NOTE — PROGRESS NOTES
1. Have you been to the ER, urgent care clinic since your last visit? Hospitalized since your last visit? No     2. Have you seen or consulted any other health care providers outside of the [address] [place] [address] since your last visit? Include any pap smears or colon screening.  No

## 2017-07-28 NOTE — PROGRESS NOTES
HPI:  I saw Sayra Villarreal in my office today in cardiovascular evaluation regarding her aortic stenosis and hypertensive heart disease. Ms. Jesika Dunn is a pleasant 78year old white female with history of chest tightness on exertion and some shortness of breath on exertion in the past, which ultimately prompted a cardiac catheterization in February of 2015, even though her nuclear myocardial perfusion study prior to that appeared to be normal. Her subsequent cardiac catheterization completed on 2/23/15 by my associate Dr. Manish Issa demonstrated only mild coronary artery disease with mild aortic stenosis with a peak systolic gradient of 21 mmHg across the valve and a valve area of 1.25 cm squared. When we did an echocardiogram in July of 2017 and her mean gradient across aortic valve was 28 mmHg suggesting moderate aortic stenosis. This gradient was actually less than it was back in July 2016 and although I think that this was just probably underestimated does not appear as if she has had any progression of her aortic stenosis.     She went in to the hospital on 2/19/15 with left sided weakness and an MRI of the brain was negative for an acute infarct at that time, however there was evidence of chronic lacunar infarcts in the deep brain matter extending into the left lentiform to the left cauda area, and there was also increased T2 flare syndrome in the periventricular white matter consistent with mild to moderate chronic microvascular ischemic disease. She comes in today relates that she is really doing quite well. She is staying fairly active and denies any real cardiovascular problems except for some palpitations that she had the other day with some vague left-sided chest pain that lasted for an hour or more. Encounter Diagnoses   Name Primary?  Aortic valve stenosis, unspecified etiology Yes    Dyslipidemia     Renovascular hypertension     Palpitations        Discussion:  This patient appears to be doing about as well as we could expect and really have no recommendations for change at this time. Her aortic stenosis appears to be moderately severe, but she has a well-preserved second heart sound that I do not feel that it is getting severe very rapidly. Historically her cholesterols have been doing fairly well on Lipitor 80 mg daily but I do not have a recent lipid profile and I am going to get a copy from Dr. Merissa Duke and hopefully her LDLs are still well-controlled in the 70s or less as they have been. Her blood pressure appears to be well-controlled today and her EKG is stable so I am simply a plan to see her again in several months or as needed if any new cardiovascular symptoms surface in the interim. PCP:  Zen Saleh MD       Past Medical History:   Diagnosis Date    Anemia NEC     Aneurysm ([address]     left kidney    Arthritis     Asthma     Cardiac catheterization 02/23/2015    R dominant. LM patent. mLAD 20%. CX patent. mRCA 20%. LVEDP 8.  EF 60%. Mild AS. RA 3/1. PA 24. W 6/5.  CI 2.94.    Cardiac echocardiogram 07/27/2016    EF 60-65%. No WMA. Mild LVH. Normal LV diastolic fx. Mild LAE. Mod-severe AS (mean grad 34 mmHg). AS has worsened since study of 1/20/15. Mild MR.  Cardiac nuclear imaging test 02/10/2015    No evidence of ischemia or prior infarction. Hyperdynamic LV fx.  EF >70%. No RWMA. Nondiagnostic EKG on pharm stress test.  Low risk.  Carotid duplex 01/12/2016    Mild < 50% bilateral ICA stenosis.       Chest tightness     exertional, in the setting of widely patent coronary arteries, possibly related to elevated left ventricular end-diastolic pressure or possibly to small-vessel disease, improved on Cardizem CD in place of Verapamil    Chronic kidney disease, stage III (moderate)     Essential hypertension     Glaucoma     Heart murmur     Hypercholesterolemia     Hypoglycemia     Raynaud's syndrome     Renal cell carcinoma (HCC)     Stage T1a Rachelan Grade 2 s/p right open partial nephrectomy 2/22/10      S/p nephrectomy 2/22/10    right    Scoliosis     Stroke [facility]) 2/2015    resid los of balance         Past Surgical History:   Procedure Laterality Date    COLONOSCOPY N/A 10/10/2016    COLONOSCOPY [facility]man Trinh [facility] END[facility]COPY    HX CYST REMOVAL      on spine    HX GYN      partial hysterectomy 1988, on Premarin until a few years ago    HX HEART CATHETERIZATION  3/18/09    Low left-sided and right-sided filling pressures (likely related to overnight fasting). Normal resting pulmonary  arterial pressure. Normal systemic pressures. Borderline left ventricular end-diastolic pressure at rest. Mild incerease in pulmonary artery pressure and pulmonary capillary wedge pressure with exercise. Mild, hemodynamically nonsignificant epicardial coronary artery disease.  HX NEPHRECTOMY  2/22/10    s/p partial right nephrectomy for right kidney lower pole mass secondary to early stage cancer    HX OTHER SURGICAL      Ovary removal    HX TRABECULECTOMY           Current Outpatient Rx   Name  Route  Sig  Dispense  Refill    losartan (COZAAR) 100 mg tablet    Oral    Take 100 mg by mouth daily.  aspirin delayed-release 81 mg tablet    Oral    Take  by mouth daily.  meloxicam (MOBIC) 15 mg tablet                      LINACLOTIDE (LINZESS PO)    Oral    Take  by mouth.  atorvastatin (LIPITOR) 80 mg tablet    Oral    Take 1 Tab by mouth daily. 90 Tab    3      Cholecalciferol, Vitamin D3, 50,000 unit cap    Oral    Take 1 Cap by mouth every seven (7) days.  alendronate (FOSAMAX) 35 mg tablet    Oral    Take 35 mg by mouth every seven (7) days.  nitroglycerin (NITROLINGUAL) 400 mcg/spray spray    SubLINGual    1 spray by SubLINGual route every five (5) minutes as needed.     1 Bottle    1      mometasone-formoterol (DULERA) 100-5 mcg/actuation HFA inhaler    Inhalation    Take 2 Puffs by inhalation two (2) times a day.  nitroglycerin (NITROBID) 2 % ointment    Topical    Apply 1 Inch to affected area as needed.  FLUNISOLIDE (AEROBID IN)    Inhalation    Take 1 Puff by inhalation daily.  LACTOBACILLUS ACIDOPHILUS (PROBIOTIC PO)    Oral    Take 1 Tab by mouth daily.  PROPYLENE GLYCOL/ (SYSTANE OP)    Ophthalmic    Apply  to eye as needed.  ipratropium (ATROVENT) 0.03 % nasal spray    Both Nostrils    2 Sprays by Both Nostrils route every twelve (12) hours.  fluticasone (FLONASE) 50 mcg/actuation nasal spray    Both Nostrils    2 Sprays by Both Nostrils route nightly.  albuterol (PROVENTIL HFA, VENTOLIN HFA) 90 mcg/actuation inhaler    Inhalation    Take 2 Puffs by inhalation as needed.  Azelastine (ASTEPRO) 0.15 % (205.5 mcg) nasal spray    Both Nostrils    1 Spray by Both Nostrils route two (2) times a day.  CYANOCOBALAMIN, VITAMIN B-12, (VITAMIN B-12 IJ)    Injection    1,000 mcg by Injection route every thirty (30) days.  cetirizine (ZYRTEC) 10 mg tablet    Oral    Take 10 mg by mouth as needed. Allergies   Allergen Reactions    Vicodin [Hydrocodone-Acetaminophen] Nausea and Vomiting       Social   Social History   Substance Use Topics    Smoking status: Never Smoker    Smokeless tobacco: Never Used    Alcohol use No         Family history: family history includes COPD in her mother; Cancer in her father; Heart Disease in her brother; Thyroid Disease in her mother. Review of Systems:   Constitutional: Negative for chills, diaphoresis, fever, malaise/fatigue and weight loss. Respiratory: Negative. Cardiovascular: Positive for chest pain and palpitations. Negative for orthopnea, claudication, leg swelling and PND. Gastrointestinal: Positive for constipation.  Negative for abdominal pain, blood in stool, diarrhea, heartburn, melena, nausea and vomiting. Musculoskeletal: Positive for back pain and falls. Negative for joint pain, myalgias and neck pain. Neurological: Positive for weakness. Physical Exam:   The patient is an alert, oriented, well developed, well nourished 78 y.o. female who was in no acute distress at the time of my examination. Visit Vitals    /80    Pulse 86    Ht 5' 2\" (1.575 m)    Wt 54 kg (119 lb)    SpO2 99%    BMI 21.77 kg/m2        BP Readings from Last 3 Encounters:   07/28/17 132/80   07/07/17 146/63   04/17/17 140/86        Wt Readings from Last 3 Encounters:   07/28/17 54 kg (119 lb)   07/07/17 53.2 kg (117 lb 3.2 oz)   04/17/17 52.8 kg (116 lb 6.4 oz)       HEENT: Conjunctivae white, mucosa moist, no pallor or cyanosis. Neck: Supple without masses, tenderness or thyromegaly. No jugular venous distention. Carotid upstrokes are full bilaterally with bilateral bruits vs radiation of murmur to the neck. Cardiovascular: Chest is symmetrical with good excursion. [place] is not displaced. No lifts, heaves or thrills. S1 and S2 are normal, with a soft grade II/VI JENNY along the LSB, with radiation to the base and right neck, without appreciable diastolic murmur, rubs, clicks or gallops. Lungs: Clear to auscultation in all fields. Abdomen: Soft. No masses, tenderness or organomegaly. Extremities: No edema, with 1+ peripheral pulses. Review of Data: Please refer to past medical history for most recent cardiac testing. Lab Results   Component Value Date/Time    Cholesterol, total 160 02/21/2015 02:02 AM    HDL Cholesterol 73 02/21/2015 02:02 AM    LDL, calculated 76.6 02/21/2015 02:02 AM    Triglyceride 52 02/21/2015 02:02 AM    CHOL/HDL Ratio 2.2 02/21/2015 02:02 AM       Results for orders placed or performed in visit on 07/28/17   AMB POC EKG ROUTINE W/ 12 LEADS, INTER & REP     Status: None    Narrative    Normal sinus rhythm, rate 86.   There is early R-wave transition anteriorly and some diffuse baseline artifact as well as diffuse nonspecific ST-T changes. This EKG is similar to the EKG of February 22, 2017       Nancy Lawrence D.O., F.A.C.C. [facility]  [facility] [place]  [facility] [address] [address]  [address]    PLEASE NOTE:  This document has been produced using voice recognition software. Unrecognized errors in transcription may be present.

## 2017-08-02 ENCOUNTER — HOSPITAL ENCOUNTER (OUTPATIENT)
Dept: NON INVASIVE DIAGNOSTICS | Age: 77
Discharge: HOME | End: 2017-08-02
Attending: INTERNAL MEDICINE
Payer: MEDICARE

## 2017-08-02 DIAGNOSIS — R06.09 DOE (DYSPNEA ON EXERTION): ICD-10-CM

## 2017-08-02 DIAGNOSIS — R06.02 SOB (SHORTNESS OF BREATH): ICD-10-CM

## 2017-08-02 PROCEDURE — 93308 TTE F-UP OR LMTD: CPT

## 2017-08-02 NOTE — PROGRESS NOTES
Limited 2D echocardiogram study was performed on patient. Report to follow. Patient armband was removed before discharging.

## 2017-08-14 ENCOUNTER — TELEPHONE (OUTPATIENT)
Dept: CARDIOLOGY CLINIC | Age: 77
End: 2017-08-14

## 2017-08-14 NOTE — TELEPHONE ENCOUNTER
I called and placed a message on the patient's answering machine at her home for her to call for the results of her recent study which was a limited echocardiogram to evaluate aortic Doppler flow and to reassess her aortic stenosis. The aortic Doppler flow suggested that her aortic stenosis actually has gotten a little worse from back in 2016 and now her aortic stenosis would be considered in the early severe range which may potentially be bad enough to give her symptoms such as exertional chest tightness, exertional shortness of breath, or exertional dizziness or passing out spells. Please check with her to see if she is having any of these symptoms currently and if not I would simply see her again in 6 months unless the symptoms began to develop in which case I would need to see her at that time. Please let her know.  ES

## 2017-08-14 NOTE — TELEPHONE ENCOUNTER
Ms. Judy Shelton called stating that Dr. Judit Montenegro left her a voicemail and said to call to speak with [facility] about recent test results.

## 2017-08-14 NOTE — TELEPHONE ENCOUNTER
----- Message from Daniel Huitron RN sent at 8/3/2017  7:38 AM EDT -----  Limited echo per last note

## 2017-08-15 NOTE — TELEPHONE ENCOUNTER
Patient states she is feeling better and isnt having symptoms like she was. She will keep an eye on them over the next couple weeks and follow up if needs to be seen sooner. If not, she will see Van Favre in 6 months.

## 2017-10-11 ENCOUNTER — OFFICE VISIT (OUTPATIENT)
Dept: ORTHOPEDIC SURGERY | Age: 77
End: 2017-10-11

## 2017-10-11 VITALS
DIASTOLIC BLOOD PRESSURE: 56 MMHG | TEMPERATURE: 97.6 F | RESPIRATION RATE: 18 BRPM | HEART RATE: 79 BPM | SYSTOLIC BLOOD PRESSURE: 124 MMHG | WEIGHT: 118.8 LBS | BODY MASS INDEX: 21.86 KG/M2 | HEIGHT: 62 IN | OXYGEN SATURATION: 100 %

## 2017-10-11 DIAGNOSIS — Z85.528 HISTORY OF RENAL CELL CANCER: ICD-10-CM

## 2017-10-11 DIAGNOSIS — G89.29 OTHER CHRONIC PAIN: Primary | ICD-10-CM

## 2017-10-11 DIAGNOSIS — G89.29 OTHER CHRONIC PAIN: ICD-10-CM

## 2017-10-11 DIAGNOSIS — M47.816 LUMBAR FACET ARTHROPATHY: ICD-10-CM

## 2017-10-11 DIAGNOSIS — Z79.891 USE OF OPIATES FOR THERAPEUTIC PURPOSES: ICD-10-CM

## 2017-10-11 DIAGNOSIS — N18.30 CHRONIC KIDNEY DISEASE, STAGE III (MODERATE) (HCC): ICD-10-CM

## 2017-10-11 DIAGNOSIS — M62.830 MUSCLE SPASM OF BACK: ICD-10-CM

## 2017-10-11 RX ORDER — LINACLOTIDE 145 UG/1
145 CAPSULE, GELATIN COATED ORAL DAILY
COMMUNITY
Start: 2017-07-22

## 2017-10-11 RX ORDER — ALENDRONATE SODIUM 70 MG/1
70 TABLET ORAL
COMMUNITY
Start: 2017-07-22 | End: 2018-01-30

## 2017-10-11 RX ORDER — ACETAMINOPHEN AND CODEINE PHOSPHATE 300; 30 MG/1; MG/1
TABLET ORAL
Qty: 45 TAB | Refills: 2 | Status: SHIPPED | OUTPATIENT
Start: 2017-11-13 | End: 2017-11-01 | Stop reason: SDUPTHER

## 2017-10-11 RX ORDER — MONTELUKAST SODIUM 10 MG/1
TABLET ORAL
Refills: 1 | COMMUNITY
Start: 2017-08-25 | End: 2019-08-30 | Stop reason: ALTCHOICE

## 2017-10-11 RX ORDER — ERGOCALCIFEROL 1.25 MG/1
50000 CAPSULE ORAL
COMMUNITY
Start: 2017-07-22 | End: 2018-10-17

## 2017-10-11 RX ORDER — FLUTICASONE PROPIONATE AND SALMETEROL 113; 14 UG/1; UG/1
POWDER, METERED RESPIRATORY (INHALATION)
Refills: 5 | COMMUNITY
Start: 2017-09-26 | End: 2019-08-30 | Stop reason: ALTCHOICE

## 2017-10-11 NOTE — MR AVS SNAPSHOT
Visit Information Date & Time Provider Department Dept. Phone Encounter #  
 10/11/2017  8:00 AM Yvrose Willis MD [address] [facility] [phone] Follow-up Instructions Return in about 3 months (around 1/11/2018) for Medication follow up. Your Appointments 10/16/2017 10:00 AM  
ULTRASOUND with [facility] ULTRASOUND [facility]. [address] ([address]) Appt Note: iva rwg  
 [phone]  
  
    
 10/20/2017  9:30 AM  
ESTABLISHED PATIENT with JESSI Bai [facility] ([address]) Appt Note: Return in about 1 year (around 10/19/2017) for FU with prior renal US with [facility]. [address], [address]  
  
   
 [address] [facility], [address] [facility]  
  
    
 1/30/2018  2:00 PM  
Follow Up with Francia Haq DO [facility] 1 ([address]) Appt Note: 6 month follow up [facility] [address] [address] [address] [phone] [address] [facility] [address] Upcoming Health Maintenance Date Due DTaP/Tdap/Td series (1 - Tdap) 2/21/1963 ZOSTER VACCINE AGE 60> 12/21/2001 GLAUCOMA SCREENING Q2Y 2/21/2007 Pneumococcal 65+ High/Highest Risk (1 of 2 - PCV13) 2/21/2007 MEDICARE YEARLY EXAM 2/21/2007 INFLUENZA AGE 9 TO ADULT 8/1/2017 Allergies as of 10/11/2017  Review Complete On: 10/11/2017 By: Yvrose Willis MD  
  
 Severity Noted Reaction Type Reactions Vicodin [Hydrocodone-acetaminophen]  09/15/2015    Nausea and Vomiting Current Immunizations  Reviewed on 2/22/2015 No immunizations on file. Not reviewed this visit You Were Diagnosed With   
  
 Codes Comments Other chronic pain    -  Primary ICD-10-CM: G89.29 ICD-9-CM: 338.29 Use of opiates for therapeutic purposes     ICD-10-CM: Z79.891 ICD-9-CM: V58.69 Lumbar facet arthropathy     ICD-10-CM: M12.88 ICD-9-CM: 721.3 Muscle spasm of back     ICD-10-CM: N27.552 ICD-9-CM: 724.8 Chronic kidney disease, stage III (moderate)     ICD-10-CM: N18.3 ICD-9-CM: 585.3 History of renal cell cancer     ICD-10-CM: Z85.528 ICD-9-CM: V10.52 Vitals BP Pulse Temp Resp Height(growth percentile) Weight(growth percentile) 124/56 79 97.6 °F (36.4 °C) (Oral) 18 5' 2\" (1.575 m) 118 lb 12.8 oz (53.9 kg) SpO2 BMI OB Status Smoking Status 100% 21.73 kg/m2 Hysterectomy Never Smoker BMI and BSA Data Body Mass Index Body Surface Area 21.73 kg/m 2 1.54 m 2 Preferred Pharmacy Pharmacy Name Phone [phone] - [place], [address] [phone] Your Updated Medication List  
  
   
This list is accurate as of: 10/11/17  8:44 AM.  Always use your most recent med list.  
  
  
  
  
 acetaminophen-codeine 300-30 mg per tablet Commonly known as:  TYLENOL #3  
1 -2 daily as needed for pain  Indications: Pain Start taking on:  11/13/2017 AEROBID IN Take 1 Puff by inhalation daily. albuterol 90 mcg/actuation inhaler Commonly known as:  PROVENTIL HFA, VENTOLIN HFA, PROAIR HFA Take 2 Puffs by inhalation as needed. alendronate 70 mg tablet Commonly known as:  FOSAMAX Take 70 mg by mouth every seven (7) days. aspirin delayed-release 81 mg tablet Take  by mouth daily. ASTEPRO 0.15 % (205.5 mcg) nasal spray Generic drug:  Azelastine 1 Spray by Both Nostrils route two (2) times a day. atorvastatin 80 mg tablet Commonly known as:  LIPITOR Take 1 Tab by mouth daily. Cholecalciferol (Vitamin D3) 50,000 unit Cap Take 1 Cap by mouth every seven (7) days. DULERA 100-5 mcg/actuation HFA inhaler Generic drug:  mometasone-formoterol Take 2 Puffs by inhalation two (2) times a day. ergocalciferol 50,000 unit capsule Commonly known as:  ERGOCALCIFEROL Take 50,000 Units by mouth every seven (7) days. FLONASE 50 mcg/actuation nasal spray Generic drug:  fluticasone 2 Sprays by Both Nostrils route nightly. fluticasone-salmeterol 113-14 mcg/actuation Aepb INL 1 PUFF PO TWICE DAILY. RM AFTER U  
  
 ipratropium 0.03 % nasal spray Commonly known as:  ATROVENT  
2 Sprays by Both Nostrils route every twelve (12) hours. LINZESS 145 mcg Cap capsule Generic drug:  linaclotide Take 145 mcg by mouth daily. losartan 100 mg tablet Commonly known as:  COZAAR Take 100 mg by mouth daily. meloxicam 15 mg tablet Commonly known as:  MOBIC Take 15 mg by mouth daily. montelukast 10 mg tablet Commonly known as:  SINGULAIR TK 1 T PO QD IN THE NANCY * nitroglycerin 2 % ointment Commonly known as:  NITROBID Apply 1 Inch to affected area as needed. * nitroglycerin 400 mcg/spray spray Commonly known as:  NITROLINGUAL  
1 spray by SubLINGual route every five (5) minutes as needed. PROBIOTIC PO Take 1 Tab by mouth daily. SYSTANE OP Apply  to eye as needed. VITAMIN B-12 INJECTION  
1,000 mcg by Injection route every thirty (30) days. ZyrTEC 10 mg tablet Generic drug:  cetirizine Take 10 mg by mouth as needed. * Notice: This list has 2 medication(s) that are the same as other medications prescribed for you. Read the directions carefully, and ask your doctor or other care provider to review them with you. Prescriptions Printed Refills  
 acetaminophen-codeine (TYLENOL #3) 300-30 mg per tablet 2 Starting on: 2017 Si -2 daily as needed for pain  Indications: Pain Class: Print Follow-up Instructions Return in about 3 months (around 1/11/2018) for Medication follow up. To-Do List   
 10/11/2017 Lab:  DRUG SCREEN UR - W/ CONFIRM Patient Instructions Low Back Arthritis: Exercises Your Care Instructions Here are some examples of typical rehabilitation exercises for your condition. Start each exercise slowly. Ease off the exercise if you start to have pain. Your doctor or physical therapist will tell you when you can start these exercises and which ones will work best for you. When you are not being active, find a comfortable position for rest. Some people are comfortable on the floor or a medium-firm bed with a small pillow under their head and another under their knees. Some people prefer to lie on their side with a pillow between their knees. Don't stay in one position for too long. Take short walks (10 to 20 minutes) every 2 to 3 hours. Avoid slopes, hills, and stairs until you feel better. Walk only distances you can manage without pain, especially leg pain. How to do the exercises Pelvic tilt 1. Lie on your back with your knees bent. 2. \"Brace\" your stomachtighten your muscles by pulling in and imagining your belly button moving toward your spine. 3. Press your lower back into the floor. You should feel your hips and pelvis rock back. 4. Hold for 6 seconds while breathing smoothly. 5. Relax and allow your pelvis and hips to rock forward. 6. Repeat 8 to 12 times. Back stretches 1. Get down on your hands and knees on the floor. 2. Relax your head and allow it to droop. Round your back up toward the ceiling until you feel a nice stretch in your upper, middle, and lower back. Hold this stretch for as long as it feels comfortable, or about 15 to 30 seconds. 3. Return to the starting position with a flat back while you are on your hands and knees. 4. Let your back sway by pressing your stomach toward the floor. Lift your buttocks toward the ceiling. 5. Hold this position for 15 to 30 seconds. 6. Repeat 2 to 4 times. Follow-up care is a key part of your treatment and safety. Be sure to make and go to all appointments, and call your doctor if you are having problems. It's also a good idea to know your test results and keep a list of the medicines you take. Where can you learn more? Go to http://meena-juan r.info/. Enter F478 in the search box to learn more about \"Low Back Arthritis: Exercises. \" Current as of: March 21, 2017 Content Version: 11.3 © 8628-5543 [employer]. Care instructions adapted under license by [employer] (which disclaims liability or warranty for this information). If you have questions about a medical condition or this instruction, always ask your healthcare professional. [address] any warranty or liability for your use of this information. Introducing [facility] [place] [facility]! Dear Yehuda: 
Thank you for requesting a [employer] account. Our records indicate that you have previously registered for a [employer] account but its currently inactive. Please call our [employer] support line at [phone]. Additional Information If you have questions, please visit the Frequently Asked Questions section of the [employer] website at [email or web address] Remember, [employer] is NOT to be used for urgent needs. For medical emergencies, dial 911. Now available from your iPhone and Android! Please provide this summary of care documentation to your next provider. Your primary care clinician is listed as NUVIA RODRIGUEZ. If you have any questions after today's visit, please call [phone].

## 2017-10-11 NOTE — PROGRESS NOTES
[facility]  [address] [place], [facility] [address]  Phone: [phone]  Fax: [phone]      ASSESSMENT   Diagnoses and all orders for this visit:    1. Other chronic pain  -     acetaminophen-codeine (TYLENOL #3) 300-30 mg per tablet; 1 -2 daily as needed for pain  Indications: Pain  -     DRUG SCREEN UR - W/ CONFIRM; Future    2. Use of opiates for therapeutic purposes  -     acetaminophen-codeine (TYLENOL #3) 300-30 mg per tablet; 1 -2 daily as needed for pain  Indications: Pain  -     DRUG SCREEN UR - W/ CONFIRM; Future    3. Lumbar facet arthropathy    4. Muscle spasm of back    5. Chronic kidney disease, stage III (moderate)    6. History of renal cell cancer         IMPRESSION AND PLAN:  Tsering Valdez is a 78 y.o. female with history of chronic lumbar pain. She admits to increased pain with colder and rainy weather. Pt has been prescribed Tylenol #3 and takes it as night with needed. 1) Pt was given information on lumbar arthritis exercises. 2) She received a refill of Tylenol #3 1-2 tabs daily prn pain. 3) A UDS was obtained. 4) She is not a candidate for NSAID's due to her history of renal cancer and renal disease. 5) Ms. Cherise Awan has a reminder for a \"due or due soon\" health maintenance. I have asked that she contact her primary care provider, Claudette Bunch MD, for follow-up on this health maintenance. 6)  demonstrated consistency with prescribing. 7) Last UDS from 04/17/2017 was consistent. 8) Pt will follow-up in 3 months. 9) She is not a candidate for NSAIDs due to her renal disease. HISTORY OF PRESENT ILLNESS:  Tsering Valdez is a 78 y.o. female with history of chronic lumbar pain. She admits to increased pain with colder and rainy weather. Pt states that she was diagnosed with anemia the same time her renal cancer was discovered. She had a partial nephrectomy and is followed by Dr. Leanne Coles for her anemia.  Pt reports that she may need an aortic valve replacement and is followed by Dr. Az Alvarez. Pt has been prescribed Tylenol #3 and takes it as night with needed. She denies any side effects from the medication and it allows her to be more functional.   Pt at this time desires to continue with current care. Pain Scale: 5/10    PCP: Do Reis MD       Past Medical History:   Diagnosis Date    Anemia NEC     Aneurysm ([address]     left kidney    Arthritis     Asthma     Cardiac catheterization 02/23/2015    R dominant. LM patent. mLAD 20%. CX patent. mRCA 20%. LVEDP 8.  EF 60%. Mild AS. RA 3/1. PA 24. W 6/5.  CI 2.94.    Cardiac echocardiogram 07/27/2016    EF 60-65%. No WMA. Mild LVH. Normal LV diastolic fx. Mild LAE. Mod-severe AS (mean grad 34 mmHg). AS has worsened since study of 1/20/15. Mild MR.  Cardiac nuclear imaging test 02/10/2015    No evidence of ischemia or prior infarction. Hyperdynamic LV fx.  EF >70%. No RWMA. Nondiagnostic EKG on pharm stress test.  Low risk.  Carotid duplex 01/12/2016    Mild < 50% bilateral ICA stenosis.  Chest tightness     exertional, in the setting of widely patent coronary arteries, possibly related to elevated left ventricular end-diastolic pressure or possibly to small-vessel disease, improved on Cardizem CD in place of Verapamil    Chronic kidney disease, stage III (moderate)     Essential hypertension     Glaucoma     Heart murmur     Hypercholesterolemia     Hypoglycemia     Raynaud's syndrome     Renal cell carcinoma (HCC)     Stage T1a [place] Grade 2 s/p right open partial nephrectomy 2/22/10      S/p nephrectomy 2/22/10    right    Scoliosis     Stroke [facility]) 2/2015    resid los of balance        Social History     Social History    Marital status:      Spouse name: N/A    Number of children: N/A    Years of education: N/A     Occupational History    Not on file.      Social History Main Topics    Smoking status: Never Smoker  Smokeless tobacco: Never Used    Alcohol use No    Drug use: No    Sexual activity: Yes     Partners: Male     Other Topics Concern    Not on file     Social History Narrative       Current Outpatient Prescriptions   Medication Sig Dispense Refill    alendronate (FOSAMAX) 70 mg tablet Take 70 mg by mouth every seven (7) days.  ergocalciferol (ERGOCALCIFEROL) 50,000 unit capsule Take 50,000 Units by mouth every seven (7) days.  fluticasone-salmeterol 113-14 mcg/actuation aepb INL 1 PUFF PO TWICE DAILY. RM AFTER U  5    LINZESS 145 mcg cap capsule Take 145 mcg by mouth daily.  montelukast (SINGULAIR) 10 mg tablet TK 1 T PO QD IN THE NANCY  1    [START ON 11/13/2017] acetaminophen-codeine (TYLENOL #3) 300-30 mg per tablet 1 -2 daily as needed for pain  Indications: Pain 45 Tab 2    losartan (COZAAR) 100 mg tablet Take 100 mg by mouth daily.  aspirin delayed-release 81 mg tablet Take  by mouth daily.  meloxicam (MOBIC) 15 mg tablet Take 15 mg by mouth daily.  atorvastatin (LIPITOR) 80 mg tablet Take 1 Tab by mouth daily. 90 Tab 3    Cholecalciferol, Vitamin D3, 50,000 unit cap Take 1 Cap by mouth every seven (7) days.  nitroglycerin (NITROLINGUAL) 400 mcg/spray spray 1 spray by SubLINGual route every five (5) minutes as needed. 1 Bottle 1    mometasone-formoterol (DULERA) 100-5 mcg/actuation HFA inhaler Take 2 Puffs by inhalation two (2) times a day.  nitroglycerin (NITROBID) 2 % ointment Apply 1 Inch to affected area as needed.  FLUNISOLIDE (AEROBID IN) Take 1 Puff by inhalation daily.  LACTOBACILLUS ACIDOPHILUS (PROBIOTIC PO) Take 1 Tab by mouth daily.  PROPYLENE GLYCOL/ (SYSTANE OP) Apply  to eye as needed.  ipratropium (ATROVENT) 0.03 % nasal spray 2 Sprays by Both Nostrils route every twelve (12) hours.  fluticasone (FLONASE) 50 mcg/actuation nasal spray 2 Sprays by Both Nostrils route nightly.       albuterol (PROVENTIL HFA, VENTOLIN HFA) 90 mcg/actuation inhaler Take 2 Puffs by inhalation as needed.  Azelastine (ASTEPRO) 0.15 % (205.5 mcg) nasal spray 1 [place] by Both Nostrils route two (2) times a day.  CYANOCOBALAMIN, VITAMIN B-12, (VITAMIN B-12 IJ) 1,000 mcg by Injection route every thirty (30) days.  cetirizine (ZYRTEC) 10 mg tablet Take 10 mg by mouth as needed. Allergies   Allergen Reactions    Vicodin [Hydrocodone-Acetaminophen] Nausea and Vomiting         REVIEW OF SYSTEMS    Constitutional: Negative for fever, chills, or weight change. Respiratory: Negative for cough or shortness of breath. Cardiovascular: Negative for chest pain or palpitations. Gastrointestinal: Negative for acid reflux, change in bowel habits, or constipation. Genitourinary: Negative for dysuria and flank pain. Musculoskeletal: Positive for lumbar pain. Skin: Negative for rash. Neurological: Negative for headaches, dizziness, or numbness. Endo/Heme/Allergies: Negative for increased bruising. Psychiatric/Behavioral: Negative for difficulty with sleep. PHYSICAL EXAMINATION  Visit Vitals    /56    Pulse 79    Temp 97.6 °F (36.4 °C) (Oral)    Resp 18    Ht 5' 2\" (1.575 m)    Wt 118 lb 12.8 oz (53.9 kg)    SpO2 100%    BMI 21.73 kg/m2       Constitutional: Awake, alert, and in no acute distress. Neurological: 1+ symmetrical DTRs in the lower extremities. Sensation to light touch is intact. Skin: warm, dry, and intact. Musculoskeletal: Tenderness to palpation in the lower lumbar region. Moderate pain with extension and axial loading. No pain with internal or external rotation of her hips. Negative straight leg raise bilaterally.      Hip Flex  Quads Hamstrings Ankle DF EHL Ankle PF   Right +4/5 +4/5 +4/5 +4/5 +4/5 +4/5   Left +4/5 +4/5 +4/5 +4/5 +4/5 +4/5     IMAGING:    Lumbar spine MRI from 03/16/2016 was personally reviewed with the patient and demonstrated:    [facility] 03/16[facility]16   MRI LUMB SPINE W WO CONT   Narrative MRI lumbar spine with and without contrast    COMPARISON: CT chest abdomen and pelvis October 6, 2011    CLINICAL INFORMATION: Progressive back pain, left leg pain. PROCEDURE:    MRI of the lumbar spine was performed prior to and following the uneventful  administration of 11 cc of Magnevist venous leak. Sequences included: Localizer,  sagittal T1, sagittal T1 postcontrast with fat saturation, sagittal inversion  recovery, sagittal T2, axial T1, axial T1 postcontrast, and axial T2    FINDINGS:  There is mild apex right scoliosis of the lumbar spine, with mild apex left  scoliosis of the thoracolumbar junction. 4 mm of grade 1 anterior listhesis of  L4 on L5. Vertebral body heights are preserved. No fracture. Minimal multilevel  small osteophyte formation. Mild disc narrowing and desiccation throughout the lumbar spine. Conus terminates at the L1-2 level. Mild degenerative changes present in the sacroiliac joints. Patient has  undergone previous partial left nephrectomy. High T2 signal foci in the kidneys,  incompletely evaluated on this study. High T2 signal focus in the posterior  right hepatic lobe is also possibly a cyst, but incompletely evaluated. 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 Correlation of axial and sagittal images reveals the following:    At L1-L2: No significant disc pathology or proliferative changes. No central  canal or foraminal stenosis. At L2-L3: Mild bulging of the posterolateral disc corners, with mild facet  arthropathy and ligamentous buckling. Trace facet joint effusions. Canal is  patent. Mild right greater than left foraminal narrowing. At L3-L4: Mild bulging of the posterolateral disc corners, with mild facet  arthropathy and ligamentous buckling. Trace right facet joint effusion. Canal is  patent. Mild bilateral foraminal narrowing.     At L4-L5: Uncovering of the disc due to the listhesis, with diffuse disc bulge. Mild to moderate facet arthropathy. Canal is patent. Susceptibility artifact in  the posterior soft tissues. Mild bilateral foraminal narrowing. Question prior  left hemilaminectomy. At L5-S1: Small posterior disc bulge, with mild facet arthropathy. Canal is  patent. No significant foraminal narrowing. Impression IMPRESSION:    Thoracolumbar scoliosis. Grade 1 anterior listhesis of L4 on L5. Multilevel  degenerative changes, with overall patent canal. Multilevel mild foraminal  narrowing. Question prior left hemilaminectomy at L4. High T2 signal foci in the kidneys, incompletely evaluated on this study. High  T2 signal focus in the posterior right hepatic lobe is also possibly a cyst, but  incompletely evaluated. Written by Velvet Hollingsworth, as dictated by Polo Fernandez MD.  I, Dr. Polo Fernandez confirm that all documentation is accurate.

## 2017-11-01 DIAGNOSIS — G89.29 OTHER CHRONIC PAIN: ICD-10-CM

## 2017-11-01 DIAGNOSIS — Z79.891 USE OF OPIATES FOR THERAPEUTIC PURPOSES: ICD-10-CM

## 2017-11-01 RX ORDER — ACETAMINOPHEN AND CODEINE PHOSPHATE 300; 30 MG/1; MG/1
TABLET ORAL
Qty: 45 TAB | Refills: 1 | OUTPATIENT
Start: 2017-11-02 | End: 2018-01-02 | Stop reason: SDUPTHER

## 2017-11-06 ENCOUNTER — TELEPHONE (OUTPATIENT)
Dept: ORTHOPEDIC SURGERY | Age: 77
End: 2017-11-06

## 2017-11-06 NOTE — TELEPHONE ENCOUNTER
Patient came by and was concerned about a Rx for Tylenol #3 Rf 1 called into the pharmacy by our office  Per NP Tesfaye Shown as she had already received a written  Rx with 2 RF by Dr. Abhishek Rivera on 10/11/17. The written one was brought back and shredded per NP [place] and patient kept the one that she had picked up. NP Christelle aware and patient will have to call to get her 3rd refill. Patient understood and will call when she needs more.

## 2018-01-02 DIAGNOSIS — Z79.891 USE OF OPIATES FOR THERAPEUTIC PURPOSES: ICD-10-CM

## 2018-01-02 DIAGNOSIS — G89.29 OTHER CHRONIC PAIN: ICD-10-CM

## 2018-01-02 RX ORDER — ACETAMINOPHEN AND CODEINE PHOSPHATE 300; 30 MG/1; MG/1
1-2 TABLET ORAL
Qty: 45 TAB | Refills: 0 | OUTPATIENT
Start: 2018-01-02 | End: 2018-01-25 | Stop reason: SDUPTHER

## 2018-01-02 NOTE — TELEPHONE ENCOUNTER
PHONE IN RX    Last Visit: 10/11/2017 with MD Nargis Marcelo    Next Appointment: 01/25/2018 with MD Nargis Marcelo   Previous Refill Encounters: 11/02/2017 per NP [place] #45 with 1 refill     Requested Prescriptions     Pending Prescriptions Disp Refills    acetaminophen-codeine (TYLENOL #3) 300-30 mg per tablet 45 Tab 0     Sig: Take 1-2 Tabs by mouth daily as needed for Pain.

## 2018-01-03 NOTE — TELEPHONE ENCOUNTER
Med phoned into pharmacy on file, spoke with Pharmacist Zonia Bunn. Called patient, verified , informed of above, verbalized agreement/understanding. No further action required at this time.

## 2018-01-25 ENCOUNTER — OFFICE VISIT (OUTPATIENT)
Dept: ORTHOPEDIC SURGERY | Age: 78
End: 2018-01-25

## 2018-01-25 VITALS
SYSTOLIC BLOOD PRESSURE: 174 MMHG | DIASTOLIC BLOOD PRESSURE: 79 MMHG | BODY MASS INDEX: 21.35 KG/M2 | RESPIRATION RATE: 14 BRPM | HEIGHT: 62 IN | HEART RATE: 78 BPM | WEIGHT: 116 LBS

## 2018-01-25 DIAGNOSIS — G89.29 OTHER CHRONIC PAIN: Primary | ICD-10-CM

## 2018-01-25 DIAGNOSIS — N18.30 CHRONIC KIDNEY DISEASE, STAGE III (MODERATE) (HCC): ICD-10-CM

## 2018-01-25 DIAGNOSIS — Z79.891 USE OF OPIATES FOR THERAPEUTIC PURPOSES: ICD-10-CM

## 2018-01-25 DIAGNOSIS — M47.816 LUMBAR FACET ARTHROPATHY: ICD-10-CM

## 2018-01-25 DIAGNOSIS — Z90.5 S/P NEPHRECTOMY: ICD-10-CM

## 2018-01-25 RX ORDER — ACETAMINOPHEN AND CODEINE PHOSPHATE 300; 30 MG/1; MG/1
TABLET ORAL
Qty: 60 TAB | Refills: 0 | Status: SHIPPED | OUTPATIENT
Start: 2018-01-25 | End: 2018-01-25 | Stop reason: SDUPTHER

## 2018-01-25 RX ORDER — ACETAMINOPHEN AND CODEINE PHOSPHATE 300; 30 MG/1; MG/1
TABLET ORAL
Qty: 60 TAB | Refills: 2 | Status: SHIPPED | OUTPATIENT
Start: 2018-01-25 | End: 2018-04-05 | Stop reason: SDUPTHER

## 2018-01-25 NOTE — PATIENT INSTRUCTIONS

## 2018-01-25 NOTE — MR AVS SNAPSHOT
[address] [place] [address] 
 
 
 [address] [phone] Patient: Benoit Loges MRN: [id number] [id number] Visit Information Date & Time Provider Department Dept. Phone Encounter #  
 1/25/2018  8:15 AM Memo Delaney MD [facility] [place] [phone] [id number] Follow-up Instructions Return in about 3 months (around 4/25/2018) for Medication follow up. Routing History Your Appointments 1/30/2018  2:00 PM  
Follow Up with Shalom Linares DO [facility] ([address] [place] [address] Road) Appt Note: 6 month follow up [place] [address] [address] [id number] [phone] [address] [phone]  
  
    
 10/17/2018  9:30 AM  
ULTRASOUND with [facility] ULTRASOUND Urology [place] [address] ([address] [place] [address]) Appt Note: Return in about 1 year (around 10/20/2018) for FU with prior renal US with Dr Barr  
 [address] [facility] [address] [place] [address] 2 [facility] [phone]  
  
    
 10/17/2018 10:45 AM  
ESTABLISHED PATIENT with Robbie Candelario MD  
[facility] ([address]) Appt Note: Return in about 1 year (around 10/20/2018) for FU with prior renal US with Dr Barr  
 [address] [facility], [address] [facility] [address]  
  
   
 [address] [facility], [address] [place] [address] Upcoming Health Maintenance Date Due DTaP/Tdap/Td series (1 - Tdap) 2/21/1963 ZOSTER VACCINE AGE 60> 12/21/2001 GLAUCOMA SCREENING Q2Y 2/21/2007 Pneumococcal 65+ Low/Medium Risk (1 of 2 - PCV13) 2/21/2007 MEDICARE YEARLY EXAM 2/21/2007 Influenza Age 5 to Adult 8/1/2017 Allergies as of 1/25/2018  Review Complete On: 1/25/2018 By: Memo Delaney MD  
  
 Severity Noted Reaction Type Reactions Vicodin [Hydrocodone-acetaminophen]  09/15/2015    Nausea and Vomiting Current Immunizations  Reviewed on 2/22/2015 No immunizations on file. Not reviewed this visit You Were Diagnosed With   
  
 Codes Comments Other chronic pain    -  Primary ICD-10-CM: G89.29 ICD-9-CM: 338.29 Use of opiates for therapeutic purposes     ICD-10-CM: Z79.891 ICD-9-CM: V58.69 Lumbar facet arthropathy     ICD-10-CM: M12.88 ICD-9-CM: 056. 3 Chronic kidney disease, stage III (moderate)     ICD-10-CM: N18.3 ICD-9-CM: 585.3 S/p nephrectomy     ICD-10-CM: Z90.5 ICD-9-CM: V45.73 Vitals BP Pulse Resp Height(growth percentile) Weight(growth percentile) BMI  
 174/79 78 14 5' 2\" (1.575 m) 116 lb (52.6 kg) 21.22 kg/m2 OB Status Smoking Status Hysterectomy Never Smoker BMI and BSA Data Body Mass Index Body Surface Area  
 21.22 kg/m 2 1.52 m 2 Preferred Pharmacy Pharmacy Name Phone [phone] - [facility], [address] AT [address] [phone] Your Updated Medication List  
  
   
This list is accurate as of: 1/25/18  8:38 AM.  Always use your most recent med list.  
  
  
  
  
 acetaminophen-codeine 300-30 mg per tablet Commonly known as:  TYLENOL #3  
1 po bid as needed for severe pain AEROBID IN Take 1 Puff by inhalation daily. albuterol 90 mcg/actuation inhaler Commonly known as:  PROVENTIL HFA, VENTOLIN HFA, PROAIR HFA Take 2 Puffs by inhalation as needed. alendronate 70 mg tablet Commonly known as:  FOSAMAX Take 70 mg by mouth every seven (7) days. aspirin delayed-release 81 mg tablet Take  by mouth daily. ASTEPRO 0.15 % (205.5 mcg) nasal spray Generic drug:  Azelastine 1 Spray by Both Nostrils route two (2) times a day. atorvastatin 80 mg tablet Commonly known as:  LIPITOR Take 1 Tab by mouth daily. Cholecalciferol (Vitamin D3) 50,000 unit Cap Take 1 Cap by mouth every seven (7) days. DULERA 100-5 mcg/actuation HFA inhaler Generic drug:  mometasone-formoterol Take 2 Puffs by inhalation two (2) times a day. ergocalciferol 50,000 unit capsule Commonly known as:  ERGOCALCIFEROL Take 50,000 Units by mouth every seven (7) days. FLONASE 50 mcg/actuation nasal spray Generic drug:  fluticasone 2 Sprays by Both Nostrils route nightly. fluticasone-salmeterol 113-14 mcg/actuation Aepb INL 1 PUFF PO TWICE DAILY. RM AFTER U  
  
 ipratropium 0.03 % nasal spray Commonly known as:  ATROVENT  
2 Sprays by Both Nostrils route every twelve (12) hours. LINZESS 145 mcg Cap capsule Generic drug:  linaclotide Take 145 mcg by mouth daily. losartan 100 mg tablet Commonly known as:  COZAAR Take 100 mg by mouth daily. meloxicam 15 mg tablet Commonly known as:  MOBIC Take 15 mg by mouth daily. montelukast 10 mg tablet Commonly known as:  SINGULAIR TK 1 T PO QD IN THE NANCY * nitroglycerin 2 % ointment Commonly known as:  NITROBID Apply 1 Inch to affected area as needed. * nitroglycerin 400 mcg/spray spray Commonly known as:  NITROLINGUAL  
1 spray by SubLINGual route every five (5) minutes as needed. PROBIOTIC PO Take 1 Tab by mouth daily. SYSTANE OP Apply  to eye as needed. VITAMIN B-12 INJECTION  
1,000 mcg by Injection route every thirty (30) days. ZyrTEC 10 mg tablet Generic drug:  cetirizine Take 10 mg by mouth as needed. * Notice: This list has 2 medication(s) that are the same as other medications prescribed for you. Read the directions carefully, and ask your doctor or other care provider to review them with you. Prescriptions Printed Refills  
 acetaminophen-codeine (TYLENOL #3) 300-30 mg per tablet 0 Si po bid as needed for severe pain  
 Class: Print Follow-up Instructions Return in about 3 months (around 4/25/2018) for Medication follow up. Patient Instructions Low Back Arthritis: Exercises Your Care Instructions Here are some examples of typical rehabilitation exercises for your condition. Start each exercise slowly. Ease off the exercise if you start to have pain. Your doctor or physical therapist will tell you when you can start these exercises and which ones will work best for you. When you are not being active, find a comfortable position for rest. Some people are comfortable on the floor or a medium-firm bed with a small pillow under their head and another under their knees. Some people prefer to lie on their side with a pillow between their knees. Don't stay in one position for too long. Take short walks (10 to 20 minutes) every 2 to 3 hours. Avoid slopes, hills, and stairs until you feel better. Walk only distances you can manage without pain, especially leg pain. How to do the exercises Pelvic tilt 1. Lie on your back with your knees bent. 2. \"Brace\" your stomach-tighten your muscles by pulling in and imagining your belly button moving toward your spine. 3. Press your lower back into the floor. You should feel your hips and pelvis rock back. 4. Hold for 6 seconds while breathing smoothly. 5. Relax and allow your pelvis and hips to rock forward. 6. Repeat 8 to 12 times. Back stretches 1. Get down on your hands and knees on the floor. 2. Relax your head and allow it to droop. Round your back up toward the ceiling until you feel a nice stretch in your upper, middle, and lower back. Hold this stretch for as long as it feels comfortable, or about 15 to 30 seconds. 3. Return to the starting position with a flat back while you are on your hands and knees. 4. Let your back sway by pressing your stomach toward the floor. Lift your buttocks toward the ceiling. 5. Hold this position for 15 to 30 seconds. 6. Repeat 2 to 4 times. Follow-up care is a key part of your treatment and safety. Be sure to make and go to all appointments, and call your doctor if you are having problems. It's also a good idea to know your test results and keep a list of the medicines you take. Where can you learn more? Go to http://meena-juan r.info/. Enter L986 in the search box to learn more about \"Low Back Arthritis: Exercises. \" Current as of: March 21, 2017 Content Version: 11.4 © 7260-5378 [employer]. Care instructions adapted under license by [employer] (which disclaims liability or warranty for this information). If you have questions about a medical condition or this instruction, always ask your healthcare professional. [address] any warranty or liability for your use of this information. Introducing [address]! Dear Addison Galarza: 
Thank you for requesting a [employer] account. Our records indicate that you have previously registered for a [employer] account but its currently inactive. Please call our [employer] support line at [phone]. Additional Information If you have questions, please visit the Frequently Asked Questions section of the [employer] website at [email or web address] Remember, [employer] is NOT to be used for urgent needs. For medical emergencies, dial 911. Now available from your iPhone and Android! Please provide this summary of care documentation to your next provider. Your primary care clinician is listed as NUVIA RODRIGUEZ. If you have any questions after today's visit, please call [phone].

## 2018-01-25 NOTE — PROGRESS NOTES
[facility]  [address] [place], [facility] [address]  Phone: [phone]  Fax: [phone]      ASSESSMENT   Diagnoses and all orders for this visit:    1. Other chronic pain  -     acetaminophen-codeine (TYLENOL #3) 300-30 mg per tablet; 1 po bid as needed for severe pain    2. Use of opiates for therapeutic purposes  -     acetaminophen-codeine (TYLENOL #3) 300-30 mg per tablet; 1 po bid as needed for severe pain    3. Lumbar facet arthropathy    4. Chronic kidney disease, stage III (moderate)    5. S/p right nephrectomy for cancer in 1/10         IMPRESSION AND PLAN:  Akshat Craig is a 78 y.o. female with history of chronic lumbar pain. She admits experiencing \"tremendous\" pain with the recently cold and rainy weather. Pt has been prescribed Tylenol #3 and takes 2 tabs daily. 1) Pt was given information on lumbar arthritis exercises. 2) She received a refill of Tylenol #3 1 tab BID prn severe pain. 3) I recommended the patient ambulate with the assistance of her single point cane. 4) I recommended the patient try OTC ointment on her hands if needed. 5) Pt has chronic renal disease and is not a candidate for NSAID's.  6) Ms. Nilda Juarez has a reminder for a \"due or due soon\" health maintenance. I have asked that she contact her primary care provider, Lucio Vanessa MD, for follow-up on this health maintenance. 7)  demonstrated consistency with prescribing. 8) Last UDS from 10/11/2017 was consistent. 9) Pt will follow-up in 3 months or sooner if needed. HISTORY OF PRESENT ILLNESS:  Akshat Craig is a 78 y.o. female with history of chronic lumbar pain. She admits experiencing \"tremendous\" pain with the recently cold and rainy weather. Pt states that she has fallen a couple times since her last office visit. She had a stroke in the past that has effected her left side.  Pt loses her balance frequently and admits that she does not ambulate with a cane as instructed. She admits that she trips easily and her left leg often slips out from underneath her. Pt is followed by cardiologist, Dr. Asael Hylton, and reports that she has discussed a possible right aortic valve replacement. She has a left renal aneurysm and has a history of right renal cancer. Pt admits to cramping in the hands with activity. Of note, she has a history of Raynaud's syndrome but very rarely uses nitroglycerin ointment. She has been prescribed Tylenol #3 and takes 2 tabs daily. Of note, patient's MME/day is 9.2. She takes Linzess and admits that she experienced constipation prior to starting pain medication. Pt denies any sedation, dizziness, or lightheadedness with the Tylenol #3. Pt at this time desires to continue with current. Pain Scale: /10    PCP: Shelia Hess MD       Past Medical History:   Diagnosis Date    Anemia NEC     Aneurysm ([address]     left kidney    Arthritis     Asthma     Cardiac catheterization 02/23/2015    R dominant. LM patent. mLAD 20%. CX patent. mRCA 20%. LVEDP 8.  EF 60%. Mild AS. RA 3/1. PA 24. W 6/5.  CI 2.94.    Cardiac echocardiogram 07/27/2016    EF 60-65%. No WMA. Mild LVH. Normal LV diastolic fx. Mild LAE. Mod-severe AS (mean grad 34 mmHg). AS has worsened since study of 1/20/15. Mild MR.  Cardiac nuclear imaging test 02/10/2015    No evidence of ischemia or prior infarction. Hyperdynamic LV fx.  EF >70%. No RWMA. Nondiagnostic EKG on pharm stress test.  Low risk.  Carotid duplex 01/12/2016    Mild < 50% bilateral ICA stenosis.       Chest tightness     exertional, in the setting of widely patent coronary arteries, possibly related to elevated left ventricular end-diastolic pressure or possibly to small-vessel disease, improved on Cardizem CD in place of Verapamil    Chronic kidney disease, stage III (moderate)     Essential hypertension     Glaucoma     Heart murmur     Hypercholesterolemia     Hypoglycemia     Raynaud's syndrome     Renal cell carcinoma ([address]     Stage T1a Furhman Grade 2 s/p right open partial nephrectomy 2/22/10      S/p nephrectomy 2/22/10    right    Scoliosis     Stroke [facility]) 2/2015    resid los of balance        Social History     Social History    Marital status:      Spouse name: N/A    Number of children: N/A    Years of education: N/A     Occupational History    Not on file. Social History Main Topics    Smoking status: Never Smoker    Smokeless tobacco: Never Used    Alcohol use No    Drug use: No    Sexual activity: Yes     Partners: Male     Other Topics Concern    Not on file     Social History Narrative       Current Outpatient Prescriptions   Medication Sig Dispense Refill    acetaminophen-codeine (TYLENOL #3) 300-30 mg per tablet 1 po bid as needed for severe pain 60 Tab 2    ergocalciferol (ERGOCALCIFEROL) 50,000 unit capsule Take 50,000 Units by mouth every seven (7) days.  fluticasone-salmeterol 113-14 mcg/actuation aepb INL 1 PUFF PO TWICE DAILY. RM AFTER U  5    LINZESS 145 mcg cap capsule Take 145 mcg by mouth daily.  montelukast (SINGULAIR) 10 mg tablet TK 1 T PO QD IN THE NANCY  1    losartan (COZAAR) 100 mg tablet Take 100 mg by mouth daily.  aspirin delayed-release 81 mg tablet Take  by mouth daily.  meloxicam (MOBIC) 15 mg tablet Take 15 mg by mouth daily.  atorvastatin (LIPITOR) 80 mg tablet Take 1 Tab by mouth daily. 90 Tab 3    Cholecalciferol, Vitamin D3, 50,000 unit cap Take 1 Cap by mouth every seven (7) days.  nitroglycerin (NITROLINGUAL) 400 mcg/spray spray 1 spray by SubLINGual route every five (5) minutes as needed. 1 Bottle 1    mometasone-formoterol (DULERA) 100-5 mcg/actuation HFA inhaler Take 2 Puffs by inhalation two (2) times a day.  nitroglycerin (NITROBID) 2 % ointment Apply 1 Inch to affected area as needed.  FLUNISOLIDE (AEROBID IN) Take 1 Puff by inhalation daily.       LACTOBACILLUS ACIDOPHILUS (PROBIOTIC PO) Take 1 Tab by mouth daily.  PROPYLENE GLYCOL/ (SYSTANE OP) Apply  to eye as needed.  ipratropium (ATROVENT) 0.03 % nasal spray 2 Sprays by Both Nostrils route every twelve (12) hours.  fluticasone (FLONASE) 50 mcg/actuation nasal spray 2 Sprays by Both Nostrils route nightly.  albuterol (PROVENTIL HFA, VENTOLIN HFA) 90 mcg/actuation inhaler Take 2 Puffs by inhalation as needed.  Azelastine (ASTEPRO) 0.15 % (205.5 mcg) nasal spray 1 [place] by Both Nostrils route two (2) times a day.  CYANOCOBALAMIN, VITAMIN B-12, (VITAMIN B-12 IJ) 1,000 mcg by Injection route every thirty (30) days.  cetirizine (ZYRTEC) 10 mg tablet Take 10 mg by mouth as needed.  alendronate (FOSAMAX) 70 mg tablet Take 70 mg by mouth every seven (7) days. Allergies   Allergen Reactions    Vicodin [Hydrocodone-Acetaminophen] Nausea and Vomiting         REVIEW OF SYSTEMS    Constitutional: Negative for fever, chills, or weight change. Respiratory: Negative for cough or shortness of breath. Cardiovascular: Negative for chest pain or palpitations. Gastrointestinal: Negative for acid reflux, change in bowel habits, or constipation. Genitourinary: Negative for dysuria and flank pain. Musculoskeletal: Positive for lumbar pain. Skin: Negative for rash. Neurological: Negative for headaches, dizziness, or numbness. Endo/Heme/Allergies: Negative for increased bruising. Psychiatric/Behavioral: Negative for difficulty with sleep. PHYSICAL EXAMINATION  Visit Vitals    /79    Pulse 78    Resp 14    Ht 5' 2\" (1.575 m)    Wt 116 lb (52.6 kg)    BMI 21.22 kg/m2       Constitutional: Awake, alert, and in no acute distress. Neurological: 1+ symmetrical DTRs in the lower extremities. Sensation to light touch is intact. Skin: warm, dry, and intact. Musculoskeletal: Tenderness to palpation in the lower lumbar region.  Moderate pain with extension and axial loading. No pain with internal or external rotation of her hips. Negative straight leg raise bilaterally. Hip Flex  Quads Hamstrings Ankle DF EHL Ankle PF   Right +4/5 +4/5 +4/5 +4/5 +4/5 +4/5   Left +4/5 +4/5 +4/5 +4/5 +4/5 +4/5     IMAGING:    Lumbar spine MRI from 03/16/2016 was personally reviewed with the patient and demonstrated:    Results from [place] encounter on 03/16/16   MRI LUMB SPINE W WO CONT   Narrative MRI lumbar spine with and without contrast    COMPARISON: CT chest abdomen and pelvis October 6, 2011    CLINICAL INFORMATION: Progressive back pain, left leg pain. PROCEDURE:    MRI of the lumbar spine was performed prior to and following the uneventful  administration of 11 cc of Magnevist venous leak. Sequences included: Localizer,  sagittal T1, sagittal T1 postcontrast with fat saturation, sagittal inversion  recovery, sagittal T2, axial T1, axial T1 postcontrast, and axial T2    FINDINGS:  There is mild apex right scoliosis of the lumbar spine, with mild apex left  scoliosis of the thoracolumbar junction. 4 mm of grade 1 anterior listhesis of  L4 on L5. Vertebral body heights are preserved. No fracture. Minimal multilevel  small osteophyte formation. Mild disc narrowing and desiccation throughout the lumbar spine. Conus terminates at the L1-2 level. Mild degenerative changes present in the sacroiliac joints. Patient has  undergone previous partial left nephrectomy. High T2 signal foci in the kidneys,  incompletely evaluated on this study. High T2 signal focus in the posterior  right hepatic lobe is also possibly a cyst, but incompletely evaluated. 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 Correlation of axial and sagittal images reveals the following:    At L1-L2: No significant disc pathology or proliferative changes. No central  canal or foraminal stenosis.     At L2-L3: Mild bulging of the posterolateral disc corners, with mild facet  arthropathy and ligamentous buckling. Trace facet joint effusions. Canal is  patent. Mild right greater than left foraminal narrowing. At L3-L4: Mild bulging of the posterolateral disc corners, with mild facet  arthropathy and ligamentous buckling. Trace right facet joint effusion. Canal is  patent. Mild bilateral foraminal narrowing. At L4-L5: Uncovering of the disc due to the listhesis, with diffuse disc bulge. Mild to moderate facet arthropathy. Canal is patent. Susceptibility artifact in  the posterior soft tissues. Mild bilateral foraminal narrowing. Question prior  left hemilaminectomy. At L5-S1: Small posterior disc bulge, with mild facet arthropathy. Canal is  patent. No significant foraminal narrowing. Impression IMPRESSION:    Thoracolumbar scoliosis. Grade 1 anterior listhesis of L4 on L5. Multilevel  degenerative changes, with overall patent canal. Multilevel mild foraminal  narrowing. Question prior left hemilaminectomy at L4. High T2 signal foci in the kidneys, incompletely evaluated on this study. High  T2 signal focus in the posterior right hepatic lobe is also possibly a cyst, but  incompletely evaluated. Written by Rachid Crowder, as dictated by Dana Jose MD.  I, Dr. Dana Jose confirm that all documentation is accurate.

## 2018-01-30 ENCOUNTER — OFFICE VISIT (OUTPATIENT)
Dept: CARDIOLOGY CLINIC | Age: 78
End: 2018-01-30

## 2018-01-30 VITALS
HEIGHT: 62 IN | BODY MASS INDEX: 21.71 KG/M2 | OXYGEN SATURATION: 97 % | HEART RATE: 80 BPM | SYSTOLIC BLOOD PRESSURE: 140 MMHG | DIASTOLIC BLOOD PRESSURE: 82 MMHG | WEIGHT: 118 LBS

## 2018-01-30 DIAGNOSIS — N18.30 CHRONIC KIDNEY DISEASE, STAGE III (MODERATE) (HCC): ICD-10-CM

## 2018-01-30 DIAGNOSIS — I35.0 AORTIC STENOSIS, MODERATE: ICD-10-CM

## 2018-01-30 DIAGNOSIS — I15.0 RENOVASCULAR HYPERTENSION: Primary | ICD-10-CM

## 2018-01-30 DIAGNOSIS — E78.5 DYSLIPIDEMIA: ICD-10-CM

## 2018-01-30 DIAGNOSIS — Z90.5 S/P NEPHRECTOMY: ICD-10-CM

## 2018-01-30 NOTE — MR AVS SNAPSHOT
[address] [address] [address] [id number] [phone] Patient: Beatriz Fox MRN: [id number] XVE:7/19/[id number] Visit Information Date & Time Provider Department Dept. Phone Encounter #  
 1/30/2018  2:00 PM Algernon Primrose, 1000 [facility] [address] [id number] Follow-up Instructions Return in about 6 months (around 7/30/2018), or if symptoms worsen or fail to improve. Your Appointments 4/26/2018  8:00 AM  
Follow Up with Yumiko Johnson MD  
[facility] [place] ([address]) Appt Note: lower back 3 mo fu  
 [address]  
  
    
 10/17/2018  9:30 AM  
ULTRASOUND with [facility] ULTRASOUND [place] [address] ([facility]) Appt Note: Return in about 1 year (around 10/20/2018) for FU with prior renal US with Dr Barr  
 301 [address] 2 [facility] 68 [address]  
  
    
 10/17/2018 10:45 AM  
ESTABLISHED PATIENT with Abdirizak Harris MD  
Urology of [facility] ([address] [place] [address]) Appt Note: Return in about 1 year (around 10/20/2018) for FU with prior renal US with Dr Barr  
 301 [facility], [facility] [address] [place] [address] [address] [place] [address] Rd  
  
   
 301 [facility], [address] [place] [address] Upcoming Health Maintenance Date Due DTaP/Tdap/Td series (1 - Tdap) 2/21/1963 ZOSTER VACCINE AGE 60> 12/21/2001 GLAUCOMA SCREENING Q2Y 2/21/2007 Pneumococcal 65+ Low/Medium Risk (1 of 2 - PCV13) 2/21/2007 MEDICARE YEARLY EXAM 2/21/2007 Influenza Age 5 to Adult 8/1/2017 Allergies as of 1/30/2018  Review Complete On: 1/30/2018 By: Algernon Primrose,  Severity Noted Reaction Type Reactions Vicodin [Hydrocodone-acetaminophen]  09/15/2015    Nausea and Vomiting Current Immunizations  Reviewed on 2/22/2015 No immunizations on file. Not reviewed this visit You Were Diagnosed With   
  
 Codes Comments Renovascular hypertension    -  Primary ICD-10-CM: I15.0 ICD-9-CM: 405.91 Aortic stenosis, moderate     ICD-10-CM: I35.0 ICD-9-CM: 424.1 Dyslipidemia     ICD-10-CM: E78.5 ICD-9-CM: 272.4 Chronic kidney disease, stage III (moderate)     ICD-10-CM: N18.3 ICD-9-CM: 585.3 S/p nephrectomy     ICD-10-CM: Z90.5 ICD-9-CM: V45.73 Vitals BP Pulse Height(growth percentile) Weight(growth percentile) SpO2 BMI  
 140/82 80 5' 2\" (1.575 m) 118 lb (53.5 kg) 97% 21.58 kg/m2 OB Status Smoking Status Hysterectomy Never Smoker Vitals History BMI and BSA Data Body Mass Index Body Surface Area  
 21.58 kg/m 2 1.53 m 2 Preferred Pharmacy Pharmacy Name Phone [place] [phone] - [address], [address] [facility] [address] AT [address] [phone] Your Updated Medication List  
  
   
This list is accurate as of: 1/30/18  2:34 PM.  Always use your most recent med list.  
  
  
  
  
 acetaminophen-codeine 300-30 mg per tablet Commonly known as:  TYLENOL #3  
1 po bid as needed for severe pain AEROBID IN Take 1 Puff by inhalation daily. albuterol 90 mcg/actuation inhaler Commonly known as:  PROVENTIL HFA, VENTOLIN HFA, PROAIR HFA Take 2 Puffs by inhalation as needed. aspirin delayed-release 81 mg tablet Take  by mouth daily. ASTEPRO 0.15 % (205.5 mcg) nasal spray Generic drug:  Azelastine 1 Spray by Both Nostrils route two (2) times a day. atorvastatin 80 mg tablet Commonly known as:  LIPITOR Take 1 Tab by mouth daily. Cholecalciferol (Vitamin D3) 50,000 unit Cap Take 1 Cap by mouth every seven (7) days. DULERA 100-5 mcg/actuation HFA inhaler Generic drug:  mometasone-formoterol Take 2 Puffs by inhalation two (2) times a day. ergocalciferol 50,000 unit capsule Commonly known as:  ERGOCALCIFEROL Take 50,000 Units by mouth every seven (7) days. FLONASE 50 mcg/actuation nasal spray Generic drug:  fluticasone 2 Sprays by Both Nostrils route nightly. fluticasone-salmeterol 113-14 mcg/actuation Aepb INL 1 PUFF PO TWICE DAILY. RM AFTER U  
  
 ipratropium 0.03 % nasal spray Commonly known as:  ATROVENT  
2 Sprays by Both Nostrils route every twelve (12) hours. LINZESS 145 mcg Cap capsule Generic drug:  linaclotide Take 145 mcg by mouth daily. losartan 100 mg tablet Commonly known as:  COZAAR Take 100 mg by mouth daily. meloxicam 15 mg tablet Commonly known as:  MOBIC Take 15 mg by mouth daily. montelukast 10 mg tablet Commonly known as:  SINGULAIR TK 1 T PO QD IN THE NANCY * nitroglycerin 2 % ointment Commonly known as:  NITROBID Apply 1 Inch to affected area as needed. * nitroglycerin 400 mcg/spray spray Commonly known as:  NITROLINGUAL  
1 spray by SubLINGual route every five (5) minutes as needed. PROBIOTIC PO Take 1 Tab by mouth daily. SYSTANE OP Apply  to eye as needed. VITAMIN B-12 INJECTION  
1,000 mcg by Injection route every thirty (30) days. ZyrTEC 10 mg tablet Generic drug:  cetirizine Take 10 mg by mouth as needed. * Notice: This list has 2 medication(s) that are the same as other medications prescribed for you. Read the directions carefully, and ask your doctor or other care provider to review them with you. We Performed the Following AMB POC EKG ROUTINE W/ 12 LEADS, INTER & REP [26606 CPT(R)] Follow-up Instructions Return in about 6 months (around 7/30/2018), or if symptoms worsen or fail to improve. To-Do List   
 02/02/2018   ECHO:  2D ECHO COMPLETE ADULT (TTE) W OR WO CONTR   
  
  
 Patient Instructions Please call us if you will have more shortness of breath or tightness in the chest.  
 
 
  
Introducing [facility] & HEALTH SERVICES! Dear Alyce Grier: 
Thank you for requesting a [employer] account. Our records indicate that you have previously registered for a [employer] account but its currently inactive. Please call our [employer] support line at [phone]. Additional Information If you have questions, please visit the Frequently Asked Questions section of the [employer] website at [email or web address] [email or web address] [email or web address] Remember, [employer] is NOT to be used for urgent needs. For medical emergencies, dial 911. Now available from your iPhone and Android! Please provide this summary of care documentation to your next provider. Your primary care clinician is listed as NUVIA RODRIGUEZ. If you have any questions after today's visit, please call [phone].

## 2018-01-30 NOTE — PROGRESS NOTES
1. Have you been to the ER, urgent care clinic since your last visit? Hospitalized since your last visit?no    2. Have you seen or consulted any other health care providers outside of the [address] [place] [address] since your last visit? Include any pap smears or colon screening.  no

## 2018-01-30 NOTE — PROGRESS NOTES
Review of Systems   Constitutional: Negative for chills, fever, malaise/fatigue and weight loss. Respiratory: Negative for cough, hemoptysis, shortness of breath and wheezing. Cardiovascular: Positive for chest pain and palpitations. Negative for orthopnea and leg swelling. Gastrointestinal: Negative. Musculoskeletal: Positive for joint pain and myalgias. Negative for falls. Neurological: Negative for dizziness.

## 2018-02-08 ENCOUNTER — HOSPITAL ENCOUNTER (OUTPATIENT)
Dept: NON INVASIVE DIAGNOSTICS | Age: 78
Discharge: HOME | End: 2018-02-08
Attending: INTERNAL MEDICINE
Payer: MEDICARE

## 2018-02-08 DIAGNOSIS — I35.0 AORTIC STENOSIS, MODERATE: ICD-10-CM

## 2018-02-08 PROCEDURE — 93308 TTE F-UP OR LMTD: CPT

## 2018-02-09 NOTE — PROGRESS NOTES
Per your last office note, \"This lady appears to be doing reasonably well, but she is very slowly getting a little bit more short of breath with less exertion and I do think her aortic stenosis at this juncture is at least moderately severe. I am going to repeat an echocardiogram early next month to compared to the echo we did 6 months ago and I told her that if she starts having more chest discomfort with less exertion, more shortness of breath with exertion or any exertional dizziness or syncope she should let us know since this may herald symptomatic aortic stenosis for which she may need surgery. I plan to refer her to Dr. Karissa Zamarripa for a TAVR if possible. \"

## 2018-02-13 ENCOUNTER — TELEPHONE (OUTPATIENT)
Dept: CARDIOLOGY CLINIC | Age: 78
End: 2018-02-13

## 2018-02-13 NOTE — TELEPHONE ENCOUNTER
I called and discussed the results of the echocardiographic report with the patient. Her aortic stenosis now appears to be severe with a mean gradient of 42 mmHg across the valve and she is getting slowly more short of breath with activity. I am going to refer her to Dr. Hawk Woody and have already called him about the patient. Please send this lady's demographic information to Dr. Irasema Bowden office so that they can call her in for an appointment. Please also make sure that Lashay Nuñez is aware of this referral and any referrals to Dr. Hawk Woody since were trying to build a case for building a combined OR/Cath lab room so we can do TAVR's at [facility].  ES

## 2018-02-13 NOTE — TELEPHONE ENCOUNTER
----- Message from Stephy Vargas LPN sent at 6/5/4614  9:13 AM EST -----  Per your last office note, \"This lady appears to be doing reasonably well, but she is very slowly getting a little bit more short of breath with less exertion and I do think her aortic stenosis at this juncture is at least moderately severe. I am going to repeat an echocardiogram early next month to compared to the echo we did 6 months ago and I told her that if she starts having more chest discomfort with less exertion, more shortness of breath with exertion or any exertional dizziness or syncope she should let us know since this may herald symptomatic aortic stenosis for which she may need surgery. I plan to refer her to Dr. Guillermo Smith for a TAVR if possible. \"

## 2018-02-14 NOTE — TELEPHONE ENCOUNTER
Spoke with Layla at Dr Berna Estrella office this morning. She has received faxed paperwork on patient and appt has been scheduled for 2/21/18.

## 2018-04-05 ENCOUNTER — OFFICE VISIT (OUTPATIENT)
Dept: ORTHOPEDIC SURGERY | Age: 78
End: 2018-04-05

## 2018-04-05 VITALS
HEIGHT: 62 IN | DIASTOLIC BLOOD PRESSURE: 64 MMHG | HEART RATE: 84 BPM | SYSTOLIC BLOOD PRESSURE: 159 MMHG | RESPIRATION RATE: 18 BRPM | BODY MASS INDEX: 21.75 KG/M2 | WEIGHT: 118.2 LBS

## 2018-04-05 DIAGNOSIS — M62.838 MUSCLE SPASM: ICD-10-CM

## 2018-04-05 DIAGNOSIS — G89.29 OTHER CHRONIC PAIN: Primary | ICD-10-CM

## 2018-04-05 DIAGNOSIS — Z79.891 USE OF OPIATES FOR THERAPEUTIC PURPOSES: ICD-10-CM

## 2018-04-05 DIAGNOSIS — M47.816 LUMBAR FACET ARTHROPATHY: ICD-10-CM

## 2018-04-05 DIAGNOSIS — N18.30 CHRONIC KIDNEY DISEASE, STAGE III (MODERATE) (HCC): ICD-10-CM

## 2018-04-05 RX ORDER — ACETAMINOPHEN AND CODEINE PHOSPHATE 300; 30 MG/1; MG/1
TABLET ORAL
Qty: 60 TAB | Refills: 0 | Status: SHIPPED | OUTPATIENT
Start: 2018-05-05 | End: 2018-07-05 | Stop reason: SDUPTHER

## 2018-04-05 RX ORDER — CLOPIDOGREL BISULFATE 75 MG/1
TABLET ORAL
COMMUNITY
End: 2018-10-17

## 2018-04-05 RX ORDER — FUROSEMIDE 20 MG/1
TABLET ORAL DAILY
COMMUNITY
End: 2018-10-17

## 2018-04-05 NOTE — MR AVS SNAPSHOT
[address] [place] [address] 
 
 
 [address] 706 [facility] 
[phone] Patient: Florence Castaneda MRN: [id number] ZYR:[birth year] Visit Information Date & Time Provider Department Dept. Phone Encounter #  
 4/5/2018 10:45 AM Leopoldo Bolt,  [facility] [address] and [facility] [place] [phone] [id number] Your Appointments 7/31/2018 11:00 AM  
Follow Up with Judy Borden DO [facility] ([facility]) Appt Note: 6 month f/u  
 [address] [place] [address] [phone] [place] [id number]  
  
    
 10/17/2018  9:30 AM  
ULTRASOUND with [facility] ULTRASOUND [facility] [place] [address] ([facility]) Appt Note: Return in about 1 year (around 10/20/2018) for FU with prior renal US with Dr Barr  
 301 [facility] 2[address] [facility] 2 [facility] 68 [address]  
  
    
 10/17/2018 10:45 AM  
ESTABLISHED PATIENT with Bi Crowder MD  
[facility] of [facility] ([facility]) Appt Note: Return in about 1 year (around 10/20/2018) for FU with prior renal US with Dr Barr  
 301 [facility], [facility] 300 [address] [facility] [address] [facility] [address]  
  
   
 301 [facility], [address] [place] [address] Upcoming Health Maintenance Date Due DTaP/Tdap/Td series (1 - Tdap) 2/21/1963 ZOSTER VACCINE AGE 60> 12/21/2001 GLAUCOMA SCREENING Q2Y 2/21/2007 Pneumococcal 65+ Low/Medium Risk (1 of 2 - PCV13) 2/21/2007 Influenza Age 5 to Adult 8/1/2017 MEDICARE YEARLY EXAM 3/14/2018 Allergies as of 4/5/2018  Review Complete On: 4/5/2018 By: Leopoldo Bolt, MD  
  
 Severity Noted Reaction Type Reactions Vicodin [Hydrocodone-acetaminophen]  09/15/2015    Nausea and Vomiting Current Immunizations  Reviewed on 2/22/2015 No immunizations on file. Not reviewed this visit You Were Diagnosed With   
  
 Codes Comments Other chronic pain    -  Primary ICD-10-CM: G89.29 ICD-9-CM: 338.29 Use of opiates for therapeutic purposes     ICD-10-CM: Z79.891 ICD-9-CM: V58.69 Lumbar facet arthropathy ([facility] [address]     ICD-10-CM: M46.96 
ICD-9-CM: 287. 3 Chronic kidney disease, stage III (moderate)     ICD-10-CM: N18.3 ICD-9-CM: 684. 3 Muscle spasm     ICD-10-CM: Y99.986 ICD-9-CM: 728.85 Vitals BP Pulse Resp Height(growth percentile) Weight(growth percentile) BMI  
 159/64 84 18 5' 2\" (1.575 m) 118 lb 3.2 oz (53.6 kg) 21.62 kg/m2 OB Status Smoking Status Hysterectomy Never Smoker BMI and BSA Data Body Mass Index Body Surface Area  
 21.62 kg/m 2 1.53 m 2 Preferred Pharmacy Pharmacy Name Phone [place]  [phone] - [place], [address] AT [address] [phone] Your Updated Medication List  
  
   
This list is accurate as of 4/5/18 11:38 AM.  Always use your most recent med list.  
  
  
  
  
 acetaminophen-codeine 300-30 mg per tablet Commonly known as:  TYLENOL #3  
1 po bid as needed for severe pain  Indications: Pain Start taking on:  5/5/2018 AEROBID IN Take 1 Puff by inhalation daily. albuterol 90 mcg/actuation inhaler Commonly known as:  PROVENTIL HFA, VENTOLIN HFA, PROAIR HFA Take 2 Puffs by inhalation as needed. aspirin delayed-release 81 mg tablet Take  by mouth daily. ASTEPRO 0.15 % (205.5 mcg) nasal spray Generic drug:  Azelastine 1 Spray by Both Nostrils route two (2) times a day. atorvastatin 80 mg tablet Commonly known as:  LIPITOR Take 1 Tab by mouth daily. Cholecalciferol (Vitamin D3) 50,000 unit Cap Take 1 Cap by mouth every seven (7) days. DULERA 100-5 mcg/actuation HFA inhaler Generic drug:  mometasone-formoterol Take 2 Puffs by inhalation two (2) times a day. ergocalciferol 50,000 unit capsule Commonly known as:  ERGOCALCIFEROL Take 50,000 Units by mouth every seven (7) days. ferrous sulfate 325 mg (65 mg iron) Cper Take  by mouth. FLONASE 50 mcg/actuation nasal spray Generic drug:  fluticasone 2 Sprays by Both Nostrils route nightly. fluticasone-salmeterol 113-14 mcg/actuation Aepb INL 1 PUFF PO TWICE DAILY. RM AFTER U  
  
 ipratropium 0.03 % nasal spray Commonly known as:  ATROVENT  
2 Sprays by Both Nostrils route every twelve (12) hours. LASIX 20 mg tablet Generic drug:  furosemide Take  by mouth daily. LINZESS 145 mcg Cap capsule Generic drug:  linaclotide Take 145 mcg by mouth daily. losartan 100 mg tablet Commonly known as:  COZAAR Take 100 mg by mouth daily. meloxicam 15 mg tablet Commonly known as:  MOBIC Take 15 mg by mouth daily. montelukast 10 mg tablet Commonly known as:  SINGULAIR TK 1 T PO QD IN THE NANCY * nitroglycerin 2 % ointment Commonly known as:  NITROBID Apply 1 Inch to affected area as needed. * nitroglycerin 400 mcg/spray spray Commonly known as:  NITROLINGUAL  
1 spray by SubLINGual route every five (5) minutes as needed. PLAVIX 75 mg Tab Generic drug:  clopidogrel Take  by mouth. PROBIOTIC PO Take 1 Tab by mouth daily. SYSTANE OP Apply  to eye as needed. VITAMIN B-12 INJECTION  
1,000 mcg by Injection route every thirty (30) days. ZyrTEC 10 mg tablet Generic drug:  cetirizine Take 10 mg by mouth as needed. * Notice: This list has 2 medication(s) that are the same as other medications prescribed for you. Read the directions carefully, and ask your doctor or other care provider to review them with you. Prescriptions Printed Refills  
 acetaminophen-codeine (TYLENOL #3) 300-30 mg per tablet 0 Starting on: 5/5/2018 Si po bid as needed for severe pain  Indications: Pain Class: Print Follow-up Instructions Return in about 3 months (around 2018) for Medication follow up. Patient Instructions Low Back Arthritis: Exercises Your Care Instructions Here are some examples of typical rehabilitation exercises for your condition. Start each exercise slowly. Ease off the exercise if you start to have pain. Your doctor or physical therapist will tell you when you can start these exercises and which ones will work best for you. When you are not being active, find a comfortable position for rest. Some people are comfortable on the floor or a medium-firm bed with a small pillow under their head and another under their knees. Some people prefer to lie on their side with a pillow between their knees. Don't stay in one position for too long. Take short walks (10 to 20 minutes) every 2 to 3 hours. Avoid slopes, hills, and stairs until you feel better. Walk only distances you can manage without pain, especially leg pain. How to do the exercises Pelvic tilt 1. Lie on your back with your knees bent. 2. \"Brace\" your stomach-tighten your muscles by pulling in and imagining your belly button moving toward your spine. 3. Press your lower back into the floor. You should feel your hips and pelvis rock back. 4. Hold for 6 seconds while breathing smoothly. 5. Relax and allow your pelvis and hips to rock forward. 6. Repeat 8 to 12 times. Back stretches 1. Get down on your hands and knees on the floor. 2. Relax your head and allow it to droop. Round your back up toward the ceiling until you feel a nice stretch in your upper, middle, and lower back. Hold this stretch for as long as it feels comfortable, or about 15 to 30 seconds. 3. Return to the starting position with a flat back while you are on your hands and knees. 4. Let your back sway by pressing your stomach toward the floor.  Lift your buttocks toward the ceiling. 5. Hold this position for 15 to 30 seconds. 6. Repeat 2 to 4 times. Follow-up care is a key part of your treatment and safety. Be sure to make and go to all appointments, and call your doctor if you are having problems. It's also a good idea to know your test results and keep a list of the medicines you take. Where can you learn more? Go to http://meena-juan r.info/. Enter V238 in the search box to learn more about \"Low Back Arthritis: Exercises. \" Current as of: March 21, 2017 Content Version: 11.4 © 6280-0722 [employer]. Care instructions adapted under license by [employer] (which disclaims liability or warranty for this information). If you have questions about a medical condition or this instruction, always ask your healthcare professional. [place] [address] any warranty or liability for your use of this information. Introducing [facility] HE[facility] SERVICES! [address] [facility] introduces [employer] patient portal. Now you can access parts of your medical record, email your doctor's office, and request medication refills online. 1. In your internet browser, go to [email or web address] 2. Click on the First Time User? Click Here link in the Sign In box. You will see the New Member Sign Up page. 3. Enter your [employer] Access Code exactly as it appears below. You will not need to use this code after youve completed the sign-up process. If you do not sign up before the expiration date, you must request a new code. · Nav[employer] Access Code: [email or web address] Expires: 7/2/2018  2:39 PM 
 
4. Enter the last four digits of your Social Security Number (xxxx) and Date of Birth (mm/dd/yyyy) as indicated and click Submit. You will be taken to the next sign-up page. 5. Create a [employer] ID. This will be your [employer] login ID and cannot be changed, so think of one that is secure and easy to remember. 6. Create a [employer] password. You can change your password at any time. 7. Enter your Password Reset Question and Answer. This can be used at a later time if you forget your password. 8. Enter your e-mail address. You will receive e-mail notification when new information is available in [address] 9. Click Sign Up. You can now view and download portions of your medical record. 10. Click the Download Summary menu link to download a portable copy of your medical information. If you have questions, please visit the Frequently Asked Questions section of the [employer] website. Remember, [employer] is NOT to be used for urgent needs. For medical emergencies, dial 911. Now available from your iPhone and Android! Please provide this summary of care documentation to your next provider. Your primary care clinician is listed as NUVIA RODRIGUEZ. If you have any questions after today's visit, please call [phone].

## 2018-04-05 NOTE — PATIENT INSTRUCTIONS

## 2018-04-05 NOTE — PROGRESS NOTES
Patient had an artifical heart valve placed in on 3/20/18 by Dr. Candy Amaya at [facility].

## 2018-04-05 NOTE — PROGRESS NOTES
[facility]  [address] [place], [facility] [address]  Phone: [phone]  Fax: [phone]    Pt's YOB: 1940    ASSESSMENT   Diagnoses and all orders for this visit:    1. Other chronic pain  -     acetaminophen-codeine (TYLENOL #3) 300-30 mg per tablet; 1 po bid as needed for severe pain  Indications: Pain    2. Use of opiates for therapeutic purposes  -     acetaminophen-codeine (TYLENOL #3) 300-30 mg per tablet; 1 po bid as needed for severe pain  Indications: Pain    3. Lumbar facet arthropathy ([address]    4. Chronic kidney disease, stage III (moderate)    5. Muscle spasm         IMPRESSION AND PLAN:  Lizet Clement is a 70 y.o. female with history of chronic lumbar pain. She admits to increased lower back pain with damp/cold weather. Pt had an artifical heart valve placed on 03/20/18 by Dr. Sondra Anne and has since been placed on Plavix. She takes Tylenol #3 as her pain warrants with benefit. 1) Pt was given information on lumbar arthritis exercises. 2) She is on Plavix and has a history of chronic renal disease so she is not a candidate for NSAID's.  3) Pt received a refill of Tylenol #3 1 tab BID prn severe pain. 4) I strongly encouraged the patient try cardiac rehab. She will contact Dr. Dorisann Curling to discussed starting cardiac rehab at a facility closer to her residence. 5) Ms. Debby Cavanaugh has a reminder for a \"due or due soon\" health maintenance. I have asked that she contact her primary care provider, Kalyan Stewart MD, for follow-up on this health maintenance. 6)  demonstrated consistency with prescribing. 7) Last UDS from 01/25/2018 was consistent. 8) Pt will follow-up in 3 months or sooner if needed. HISTORY OF PRESENT ILLNESS:  Lizet Clement is a 70 y.o. female with history of chronic lumbar pain. She admits to increased lower back pain with damp/cold weather. Pt had an artifical heart valve placed on 03/20/18 by Dr. Sondra Anne. She admits that she has fallen multiple times this year due to her previous cardiac issue. Pt is currently on Plavix and aspirin 81 mg daily. She will have a repeat EKG and will follow up with Dr. Bushra Mendosa next week. Pt has not been to cardiac rehab and states that the facility she was referred to [facility] is too far from her home. She has been trying to walk on her own. Pt notes that none of her other medications were discontinued but she generally only takes medications as needed. She has been prescribed Tylenol #3 as her pain warrants with benefit. Pt at this time desires to continue with current care. Pain Scale: 4/10    PCP: Kandice Lama MD     Past Medical History:   Diagnosis Date    Anemia NEC     Aneurysm ([address]     left kidney    Arthritis     Asthma     Cardiac catheterization 02/23/2015    R dominant. LM patent. mLAD 20%. CX patent. mRCA 20%. LVEDP 8.  EF 60%. Mild AS. RA 3/1. PA 24. W 6/5.  CI 2.94.    Cardiac echocardiogram 07/27/2016    EF 60-65%. No WMA. Mild LVH. Normal LV diastolic fx. Mild LAE. Mod-severe AS (mean grad 34 mmHg). AS has worsened since study of 1/20/15. Mild MR.  Cardiac nuclear imaging test 02/10/2015    No evidence of ischemia or prior infarction. Hyperdynamic LV fx.  EF >70%. No RWMA. Nondiagnostic EKG on pharm stress test.  Low risk.  Carotid duplex 01/12/2016    Mild < 50% bilateral ICA stenosis.       Chest tightness     exertional, in the setting of widely patent coronary arteries, possibly related to elevated left ventricular end-diastolic pressure or possibly to small-vessel disease, improved on Cardizem CD in place of Verapamil    Chronic kidney disease, stage III (moderate)     Essential hypertension     Glaucoma     Heart murmur     Hypercholesterolemia     Hypoglycemia     Raynaud's syndrome     Renal cell carcinoma (HCC)     Stage T1a [place]hman Grade 2 s/p right open partial nephrectomy 2/22/10      S/p nephrectomy 2/22/10 right    Scoliosis     Stroke [address] [address] 2/2015    resid los of balance        Social History     Social History    Marital status:      Spouse name: N/A    Number of children: N/A    Years of education: N/A     Occupational History    Not on file. Social History Main Topics    Smoking status: Never Smoker    Smokeless tobacco: Never Used    Alcohol use No    Drug use: No    Sexual activity: Yes     Partners: Male     Other Topics Concern    Not on file     Social History Narrative       Current Outpatient Prescriptions   Medication Sig Dispense Refill    furosemide (LASIX) 20 mg tablet Take  by mouth daily.  clopidogrel (PLAVIX) 75 mg tab Take  by mouth.  ferrous sulfate 325 mg (65 mg iron) cpER Take  by mouth.  [START ON 5/5/2018] acetaminophen-codeine (TYLENOL #3) 300-30 mg per tablet 1 po bid as needed for severe pain  Indications: Pain 60 Tab 0    ergocalciferol (ERGOCALCIFEROL) 50,000 unit capsule Take 50,000 Units by mouth every seven (7) days.  fluticasone-salmeterol 113-14 mcg/actuation aepb INL 1 PUFF PO TWICE DAILY. RM AFTER U  5    LINZESS 145 mcg cap capsule Take 145 mcg by mouth daily.  montelukast (SINGULAIR) 10 mg tablet TK 1 T PO QD IN THE NANCY  1    losartan (COZAAR) 100 mg tablet Take 100 mg by mouth daily.  aspirin delayed-release 81 mg tablet Take  by mouth daily.  atorvastatin (LIPITOR) 80 mg tablet Take 1 Tab by mouth daily. 90 Tab 3    Cholecalciferol, Vitamin D3, 50,000 unit cap Take 1 Cap by mouth every seven (7) days.  nitroglycerin (NITROLINGUAL) 400 mcg/spray spray 1 spray by SubLINGual route every five (5) minutes as needed. 1 Bottle 1    mometasone-formoterol (DULERA) 100-5 mcg/actuation HFA inhaler Take 2 Puffs by inhalation two (2) times a day.  nitroglycerin (NITROBID) 2 % ointment Apply 1 Inch to affected area as needed.  FLUNISOLIDE (AEROBID IN) Take 1 Puff by inhalation daily.       PROPYLENE GLYCOL/ (SYSTANE OP) Apply  to eye as needed.  ipratropium (ATROVENT) 0.03 % nasal spray 2 Sprays by Both Nostrils route every twelve (12) hours.  fluticasone (FLONASE) 50 mcg/actuation nasal spray 2 Sprays by Both Nostrils route nightly.  albuterol (PROVENTIL HFA, VENTOLIN HFA) 90 mcg/actuation inhaler Take 2 Puffs by inhalation as needed.  Azelastine (ASTEPRO) 0.15 % (205.5 mcg) nasal spray 1 [place] by Both Nostrils route two (2) times a day.  cetirizine (ZYRTEC) 10 mg tablet Take 10 mg by mouth as needed.  meloxicam (MOBIC) 15 mg tablet Take 15 mg by mouth daily.  LACTOBACILLUS ACIDOPHILUS (PROBIOTIC PO) Take 1 Tab by mouth daily.  CYANOCOBALAMIN, VITAMIN B-12, (VITAMIN B-12 IJ) 1,000 mcg by Injection route every thirty (30) days. Allergies   Allergen Reactions    Vicodin [Hydrocodone-Acetaminophen] Nausea and Vomiting         REVIEW OF SYSTEMS    Constitutional: Negative for fever, chills, or weight change. Respiratory: Negative for cough or shortness of breath. Cardiovascular: Negative for chest pain or palpitations. Gastrointestinal: Negative for acid reflux, change in bowel habits, or constipation. Genitourinary: Negative for dysuria and flank pain. Musculoskeletal: Positive for lumbar pain. Skin: Negative for rash. Neurological: Negative for headaches, dizziness, or numbness. Endo/Heme/Allergies: Negative for increased bruising. Psychiatric/Behavioral: Negative for difficulty with sleep. PHYSICAL EXAMINATION  Visit Vitals    /64    Pulse 84    Resp 18    Ht 5' 2\" (1.575 m)    Wt 118 lb 3.2 oz (53.6 kg)    BMI 21.62 kg/m2       Constitutional: Awake, alert, and in no acute distress. Neurological: 1+ symmetrical DTRs in the lower extremities. Sensation to light touch is intact. Skin: warm, dry, and intact. Musculoskeletal: Tenderness to palpation in the lower lumbar region. Moderate pain with extension and axial loading.  No pain with internal or external rotation of her hips. Negative straight leg raise bilaterally. Hip Flex  Quads Hamstrings Ankle DF EHL Ankle PF   Right +4/5 +4/5 +4/5 +4/5 +4/5 +4/5   Left +4/5 +4/5 +4/5 +4/5 +4/5 +4/5     IMAGING:    Lumbar spine MRI from 03/16/2016 was personally reviewed with the patient and demonstrated:  Results from [facility] on 03/16/16   MRI LUMB SPINE W WO CONT    Narrative MRI lumbar spine with and without contrast    COMPARISON: CT chest abdomen and pelvis October 6, 2011    CLINICAL INFORMATION: Progressive back pain, left leg pain. PROCEDURE:    MRI of the lumbar spine was performed prior to and following the uneventful  administration of 11 cc of Magnevist venous leak. Sequences included: Localizer,  sagittal T1, sagittal T1 postcontrast with fat saturation, sagittal inversion  recovery, sagittal T2, axial T1, axial T1 postcontrast, and axial T2    FINDINGS:  There is mild apex right scoliosis of the lumbar spine, with mild apex left  scoliosis of the thoracolumbar junction. 4 mm of grade 1 anterior listhesis of  L4 on L5. Vertebral body heights are preserved. No fracture. Minimal multilevel  small osteophyte formation. Mild disc narrowing and desiccation throughout the lumbar spine. Conus terminates at the L1-2 level. Mild degenerative changes present in the sacroiliac joints. Patient has  undergone previous partial left nephrectomy. High T2 signal foci in the kidneys,  incompletely evaluated on this study. High T2 signal focus in the posterior  right hepatic lobe is also possibly a cyst, but incompletely evaluated. 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 Correlation of axial and sagittal images reveals the following:    At L1-L2: No significant disc pathology or proliferative changes. No central  canal or foraminal stenosis. At L2-L3: Mild bulging of the posterolateral disc corners, with mild facet  arthropathy and ligamentous buckling.  Trace facet joint effusions. Canal is  patent. Mild right greater than left foraminal narrowing. At L3-L4: Mild bulging of the posterolateral disc corners, with mild facet  arthropathy and ligamentous buckling. Trace right facet joint effusion. Canal is  patent. Mild bilateral foraminal narrowing. At L4-L5: Uncovering of the disc due to the listhesis, with diffuse disc bulge. Mild to moderate facet arthropathy. Canal is patent. Susceptibility artifact in  the posterior soft tissues. Mild bilateral foraminal narrowing. Question prior  left hemilaminectomy. At L5-S1: Small posterior disc bulge, with mild facet arthropathy. Canal is  patent. No significant foraminal narrowing.            Impression IMPRESSION:    Thoracolumbar scoliosis. Grade 1 anterior listhesis of L4 on L5. Multilevel  degenerative changes, with overall patent canal. Multilevel mild foraminal  narrowing. Question prior left hemilaminectomy at L4. High T2 signal foci in the kidneys, incompletely evaluated on this study. High  T2 signal focus in the posterior right hepatic lobe is also possibly a cyst, but  incompletely evaluated. Written by Mekhi Correa, as dictated by Eric Fournier MD.  I, Dr. Eric Fournier confirm that all documentation is accurate.

## 2018-04-09 ENCOUNTER — TELEPHONE (OUTPATIENT)
Dept: CARDIAC REHAB | Age: 78
End: 2018-04-09

## 2018-04-09 NOTE — TELEPHONE ENCOUNTER
Cardiac Rehab called patient and spoke to her about the program. She is interested and wants to see what her insurance will cover. Will follow up with benefit information.      Thank you,  Stacey Storey

## 2018-04-19 ENCOUNTER — HOSPITAL ENCOUNTER (OUTPATIENT)
Dept: CARDIAC REHAB | Age: 78
Discharge: HOME | End: 2018-04-19

## 2018-05-03 ENCOUNTER — HOSPITAL ENCOUNTER (OUTPATIENT)
Dept: CARDIAC REHAB | Age: 78
Discharge: HOME | End: 2018-05-03
Payer: MEDICARE

## 2018-05-03 PROCEDURE — 93798 PHYS/QHP OP CAR RHAB W/ECG: CPT

## 2018-05-03 NOTE — PROGRESS NOTES
[place] [address] FL[facility] Service Date: [id number] Session #:  1 Time In:  1000 Time Out: 1100 O2 with therapy:                            L/Min. Patients carry-over of treatment evident by: First exercise session, great participation.      OBJECTIVE Objective Comments: none   Skin [x] - Warm    [] - Cool     [] - Dry   [] - Moist    [] - Dusky   [] - Pale   [] - Cyanosis   Dependent Edema: [x] - None   OR Location:   Blood Glucose: [x]- NA      Fasting:   Pre-Exercise:  Post-Exercise:   Medications taken as prescribed:   [x] - Yes              [] - No Medication Comments:   Pre SpO2:  97     Pre HR:  68 Pre BP:  159/77 Chest Pain:  0 Weight:  115 Other Pain (1-10) and Location:  1   Post SpO2:   98       Post HR:  76 Post BP:  148/70 Chest Pain: 0 Other Pain (1-10) and Location:  1   ECG interpretation at rest: NSR   ECG interpretation post-exercise:  NSR                 Self-Care/ Education   []-CHF/Heart Disease         []- Heart Attack Warning Signs        [x]- Exercise Safety  -Video      []- Risk Factors for CAD        []- Medications   []- Breathing Techniques    []- Collaborative Self-Mgt                 []- Hypertension            []-Adequate Sleep                []-  Nutrition                                              []-Cholesterol and Lipids    []- Stress Mgt/Emotional Health      []-Diabetes Mellitus       []-Intimacy Concerns with Heart Disease         []-Travel/Environment         []-Returning pre-cardiac activities   []-Home Exercise    []-Fall Precautions   [x]-Other: Exercise Video   Patient Response []- NA [x]- Patient actively participated in education    []- Patient disengaged during education     [] - Able to recite main objectives    [] - Unable to recite main objectives [] - Able to demonstrate     [] - Unable to demonstrate   Educational comments:    EXERCISE PRESCRIPTION:        MODALITY   Time   (min) Speed/Level During Exercise Post Exercise      HR PDS   1-10 PES  1-10 Pain  1-10 HR PDS   1-10 PES  1-10 Pain  1-10   stretching/monitoring/deep breathing 17  65 1 1 1 60 1 1 1   Arm Bike 20 5 100 1 4 1 96 1 1 1   TM 23 1 98 2 4 2 90 1 1 1                                Session #2 Time in:1100 Time out:1155   MODALITY   Time   (min) Speed/Level During Exercise Post Exercise      HR PDS   1-10 PES  1-10 Pain  1 - 10 HR PDS   1-10 PES  1-10 Pain   1 - 10   stretching/monitoring/deep breathing                       Nustep 20 1.0 82 1 4 1 74 1 1 1   Exercise Video 30  78 1 1 1 76 1 1 1                             Patient Response to Exercise Prescription   []- ? Dyspnea                        [] - ? Fatigue                              [] - Abnormal responses:  Explain         [] - ?  Pain (Describe location, description of pain, action(s) taken:         [] - Experienced no problems; no cuing required            [x]- Good effort               []- Fair effort                   []- Poor   effort        [x]- Good motivation         []- Fair motivation                 []- Poor motivation         [] - Appropriate physiological responses                             Exercise Comments:    ASSESSMENT   []- Patient unable to  progress towards  LTGs due to:  []- Illness       []- Weakness/debility       []- Poor effort       []- Poor Motivation       []- Poor Attendance        [x]- Patient progressing towards LTGs as evident by:    [] Decreased dyspnea on exertion                 [] Decreased fatigue                  [] Improved strength                [] Improved aerobic capacity & endurance                [] Decreased pain               [] Increased pacing          [] Improved emotional health          [] Cigarette reduction / Smoking cessation participation            [] Improved self-monitoring at home          [] Improved nutritional awareness/monitoring          [x] Other (Describe):  First Exercise Session          Additional Comments:    PLAN OF CARE [x] - Continue as written   OR [] - Adjust treatment plan as follows:           Physician supervision provided this date of service by:  Dr. Azeb Solis [id number] (non-monitored): Total minutes:    Code 96642 (monitored):             Total minutes: 110  *ECG rhythm attached      [] 1 CR session only   OR     []  2 CR sessions       [] 1 CR session only     OR     [x]  2 CR sessions       *EKG Strip:    Ruthy Hinojosa RN  5/3/2018, 1:25 PM

## 2018-05-08 ENCOUNTER — HOSPITAL ENCOUNTER (OUTPATIENT)
Dept: CARDIAC REHAB | Age: 78
Discharge: HOME | End: 2018-05-08
Payer: MEDICARE

## 2018-05-08 PROCEDURE — 93798 PHYS/QHP OP CAR RHAB W/ECG: CPT

## 2018-05-08 NOTE — PROGRESS NOTES
[place] [facility] FL[facility]ET Service Date: [id number] Session #:  3 Time In:  1000 Time Out: 1120 O2 with therapy:                            L/Min. Patients carry-over of treatment evident by: 2nd exercise session, no soreness after 1st session.      OBJECTIVE Objective Comments: none   Skin [x] - Warm    [] - Cool     [] - Dry   [] - Moist    [] - Dusky   [] - Pale   [] - Cyanosis   Dependent Edema: [x] - None   OR Location:   Blood Glucose: [x]- NA      Fasting:   Pre-Exercise:  Post-Exercise:   Medications taken as prescribed:   [x] - Yes              [] - No Medication Comments:   Pre SpO2:  100     Pre HR:  91 Pre BP:  118/60 Chest Pain:  0 Weight:  114 Other Pain (1-10) and Location:  1   Post SpO2:   100       Post HR:  92 Post BP:  123/64 Chest Pain: 0 Other Pain (1-10) and Location:  1   ECG interpretation at rest: NSR   ECG interpretation post-exercise:  NSR                 Self-Care/ Education   []-CHF/Heart Disease         []- Heart Attack Warning Signs        [x]- Exercise Safety        []- Risk Factors for CAD        [x]- Medications   []- Breathing Techniques    []- Collaborative Self-Mgt                 []- Hypertension            []-Adequate Sleep                []-  Nutrition                                              []-Cholesterol and Lipids    []- Stress Mgt/Emotional Health      []-Diabetes Mellitus       []-Intimacy Concerns with Heart Disease         []-Travel/Environment         []-Returning pre-cardiac activities   []-Home Exercise    []-Fall Precautions   []-Other:     Patient Response []- NA [x]- Patient actively participated in education    []- Patient disengaged during education     [] - Able to recite main objectives    [] - Unable to recite main objectives [] - Able to demonstrate     [] - Unable to demonstrate   Educational comments:    EXERCISE PRESCRIPTION:        MODALITY   Time   (min) Speed/Level During Exercise Post Exercise      HR PDS   1-10 PES  1-10 Pain  1-10 HR PDS   1-10 PES  1-10 Pain  1-10   stretching/monitoring/deep breathing 20  93 1 1 1 88 1 1 1   Arm Bike 20 5 96 1 4 1 91 1 1 1   Nustep 20 2 87 1 4 1 85 1 1 1   TM 20 1.2 94 1 4 4 90 1 1 1                   Session #4 Time in: Time out:   MODALITY   Time   (min) Speed/Level During Exercise Post Exercise      HR PDS   1-10 PES  1-10 Pain  1 - 10 HR PDS   1-10 PES  1-10 Pain   1 - 10   stretching/monitoring/deep breathing                                                                           Patient Response to Exercise Prescription   []- ? Dyspnea                        [] - ? Fatigue                            [x] - Abnormal responses:  Explain  - Patient stopped exercise after 80 mins. due to sinus/allergy congestion, runny nose. [] - ?  Pain (Describe location, description of pain, action(s) taken:         [] - Experienced no problems; no cuing required            [x]- Good effort               []- Fair effort                   []- Poor   effort        [x]- Good motivation         []- Fair motivation                 []- Poor motivation         [x] - Appropriate physiological responses                             Exercise Comments:    ASSESSMENT   []- Patient unable to  progress towards  LTGs due to:  []- Illness       []- Weakness/debility       []- Poor effort       []- Poor Motivation       []- Poor Attendance        [x]- Patient progressing towards LTGs as evident by:    [] Decreased dyspnea on exertion                 [] Decreased fatigue                  [] Improved strength                [] Improved aerobic capacity & endurance                [] Decreased pain               [x] Increased pacing          [] Improved emotional health          [] Cigarette reduction / Smoking cessation participation            [] Improved self-monitoring at home          [] Improved nutritional awareness/monitoring          [x] Other (Describe): 2n exercise session           Additional Comments:    PLAN OF CARE [x] - Continue as written   OR    [] - Adjust treatment plan as follows:           Physician supervision provided this date of service by:  Dr. Jackeline Billingsley     BILLING Code 62424 (non-monitored): Total minutes:    Code 17690 (monitored):             Total minutes: 80  *ECG rhythm attached      [] 1 CR session only   OR     []  2 CR sessions       [x] 1 CR session only     OR     []  2 CR sessions       *EKG Strip:    Tee George RN  5/8/2018, 11:06 AM

## 2018-05-10 ENCOUNTER — HOSPITAL ENCOUNTER (OUTPATIENT)
Dept: GENERAL RADIOLOGY | Age: 78
Discharge: HOME | End: 2018-05-10
Payer: MEDICARE

## 2018-05-10 DIAGNOSIS — J20.9 ACUTE BRONCHITIS: ICD-10-CM

## 2018-05-10 PROCEDURE — 71046 X-RAY EXAM CHEST 2 VIEWS: CPT

## 2018-05-15 ENCOUNTER — HOSPITAL ENCOUNTER (OUTPATIENT)
Dept: CARDIAC REHAB | Age: 78
Discharge: HOME | End: 2018-05-15
Payer: MEDICARE

## 2018-05-15 PROCEDURE — 93798 PHYS/QHP OP CAR RHAB W/ECG: CPT

## 2018-05-15 NOTE — PROGRESS NOTES
[place] [facility] Service Date: [id number] Session #:  4 Time In:  1000 Time Out: 1100 O2 with therapy:                            L/Min. Patients carry-over of treatment evident by: Pt. Went to  For cold/asthma, on 5 meds, feels better, but still has cough and runny nose. OBJECTIVE Objective Comments: none   Skin [x] - Warm    [] - Cool     [] - Dry   [] - Moist    [] - Dusky   [] - Pale   [] - Cyanosis   Dependent Edema: [x] - None   OR Location:   Blood Glucose: [x]- NA      Fasting:   Pre-Exercise:  Post-Exercise:   Medications taken as prescribed:   [x] - Yes              [] - No Medication Comments: BP may be elevated due to all the cold/asthma meds she is on, doesn't know med names.    Pre SpO2:  100     Pre HR:  78 Pre BP:  164/77 Chest Pain:  0 Weight:  116 Other Pain (1-10) and Location:  1   Post SpO2:   100       Post HR:  85 Post BP:  155/75 Chest Pain: 0 Other Pain (1-10) and Location:  1   ECG interpretation at rest: NSR   ECG interpretation post-exercise:  NSR                 Self-Care/ Education   []-CHF/Heart Disease         []- Heart Attack Warning Signs        [x]- Exercise Safety        []- Risk Factors for CAD        []- Medications   [x]- Breathing Techniques    [x]- Collaborative Self-Mgt                 [x]- Hypertension            [x]-Adequate Sleep                [x]-  Nutrition                                              []-Cholesterol and Lipids    [x]- Stress Mgt/Emotional Health      []-Diabetes Mellitus       []-Intimacy Concerns with Heart Disease         []-Travel/Environment         []-Returning pre-cardiac activities   []-Home Exercise    []-Fall Precautions   []-Other:     Patient Response []- NA [x]- Patient actively participated in education    []- Patient disengaged during education     [] - Able to recite main objectives    [] - Unable to recite main objectives [] - Able to demonstrate     [] - Unable to demonstrate   Educational comments: EXERCISE PRESCRIPTION:        MODALITY   Time   (min) Speed/Level During Exercise Post Exercise      HR PDS   1-10 PES  1-10 Pain  1-10 HR PDS   1-10 PES  1-10 Pain  1-10   stretching/monitoring/deep breathing 20  74 1 1 1 72 1 1 1   Arm Bike 20 5 98 1 4 1 93 1 1 1   TM 20 1.2 108 1 4 1 99 1 1 1                                Session #5 Time in:1100 Time out:1145   MODALITY   Time   (min) Speed/Level During Exercise Post Exercise      HR PDS   1-10 PES  1-10 Pain  1 - 10 HR PDS   1-10 PES  1-10 Pain   1 - 10   stretching/monitoring/deep breathing                       Nustep 20 3 99 1 4 1 93 1 1 1   Rec Bike 20 39 97 1 4 1 92 1 1 1                             Patient Response to Exercise Prescription   []- ? Dyspnea                        [] - ? Fatigue                              [] - Abnormal responses:  Explain         [] - ?  Pain (Describe location, description of pain, action(s) taken:         [x] - Experienced no problems; no cuing required            [x]- Good effort               []- Fair effort                   []- Poor   effort        [x]- Good motivation         []- Fair motivation                 []- Poor motivation         [x] - Appropriate physiological responses                             Exercise Comments:    ASSESSMENT   []- Patient unable to  progress towards  LTGs due to:  []- Illness       []- Weakness/debility       []- Poor effort       []- Poor Motivation       []- Poor Attendance        [x]- Patient progressing towards LTGs as evident by:    [] Decreased dyspnea on exertion                 [] Decreased fatigue                  [x] Improved strength                [x] Improved aerobic capacity & endurance                [] Decreased pain               [] Increased pacing          [] Improved emotional health          [] Cigarette reduction / Smoking cessation participation            [] Improved self-monitoring at home          [] Improved nutritional awareness/monitoring          [] Other (Describe): Additional Comments:    PLAN OF CARE [x] - Continue as written   OR    [] - Adjust treatment plan as follows:           Physician supervision provided this date of service by:  Dr. Audelia Olivas       BILLING Code 77208 (non-monitored): Total minutes:    Code 85845 (monitored):             Total minutes: 100  *ECG rhythm attached      [] 1 CR session only   OR     []  2 CR sessions       [] 1 CR session only     OR     [x]  2 CR sessions       *EKG Strip:    Karen Velazquez RN  5/15/2018, 9:56 AM

## 2018-05-17 ENCOUNTER — HOSPITAL ENCOUNTER (OUTPATIENT)
Dept: CARDIAC REHAB | Age: 78
Discharge: HOME | End: 2018-05-17
Payer: MEDICARE

## 2018-05-17 PROCEDURE — 93798 PHYS/QHP OP CAR RHAB W/ECG: CPT

## 2018-05-17 NOTE — PROGRESS NOTES
[place] [facility] Service Date: [id number] Session #:  6 Time In:  1000 Time Out: 1100 O2 with therapy:                            L/Min. Patients carry-over of treatment evident by: Pt. Feels better, but still some cough and congestion, still on medications for the cold.    OBJECTIVE Objective Comments: none   Skin [x] - Warm    [] - Cool     [] - Dry   [] - Moist    [] - Dusky   [] - Pale   [] - Cyanosis   Dependent Edema: [x] - None   OR Location:   Blood Glucose: [x]- NA      Fasting:   Pre-Exercise:  Post-Exercise:   Medications taken as prescribed:   [] - Yes              [] - No Medication Comments:   Pre SpO2:  100     Pre HR:  74 Pre BP:  176/71 Chest Pain:  0 Weight:  115 Other Pain (1-10) and Location:  1   Post SpO2:   100       Post HR:  87 Post BP:  167/74 RA  151/76 LA   Chest Pain: 0 Other Pain (1-10) and Location:  1   ECG interpretation at rest: NSR   ECG interpretation post-exercise:  NSR                 Self-Care/ Education   []-CHF/Heart Disease         []- Heart Attack Warning Signs        [x]- Exercise Safety        []- Risk Factors for CAD        [x]- Medications   []- Breathing Techniques    []- Collaborative Self-Mgt                 []- Hypertension            []-Adequate Sleep                []-  Nutrition                                              []-Cholesterol and Lipids    []- Stress Mgt/Emotional Health      []-Diabetes Mellitus       []-Intimacy Concerns with Heart Disease         [x]-Travel/Environment         []-Returning pre-cardiac activities   []-Home Exercise    []-Fall Precautions   []-Other:     Patient Response []- NA [x]- Patient actively participated in education    []- Patient disengaged during education     [] - Able to recite main objectives    [] - Unable to recite main objectives [] - Able to demonstrate     [] - Unable to demonstrate   Educational comments:    EXERCISE PRESCRIPTION:        MODALITY   Time   (min) Speed/Level During Exercise Post Exercise      HR PDS   1-10 PES  1-10 Pain  1-10 HR PDS   1-10 PES  1-10 Pain  1-10   stretching/monitoring/deep breathing 20  76 1 1 1 72 1 1 1   Arm Bike 20 13 94 1 4 1 90 1 1 1   TM 20 1.6 102 1 4 1 97 1 1 1                                Session #7 Time in:1100 Time out:1140   MODALITY   Time   (min) Speed/Level During Exercise Post Exercise      HR PDS   1-10 PES  1-10 Pain  1 - 10 HR PDS   1-10 PES  1-10 Pain   1 - 10   stretching/monitoring/deep breathing 5  95 1 1 1 89 1 1 1   [place] Air 10 40 101 1 4 1 95 1 1 1   Nustep 20 2 104 1 4 1 97 1 1 1                             Patient Response to Exercise Prescription   []- ? Dyspnea                        [] - ? Fatigue                              [] - Abnormal responses:  Explain         [] - ? Pain (Describe location, description of pain, action(s) taken:         [x] - Experienced no problems; no cuing required            [x]- Good effort               []- Fair effort                   []- Poor   effort        [x]- Good motivation         []- Fair motivation                 []- Poor motivation         [x] - Appropriate physiological responses                             Exercise Comments:    ASSESSMENT   []- Patient unable to  progress towards  LTGs due to:  []- Illness       []- Weakness/debility       []- Poor effort       []- Poor Motivation       []- Poor Attendance        [x]- Patient progressing towards LTGs as evident by:    [] Decreased dyspnea on exertion                 [] Decreased fatigue                  [x] Improved strength                [x] Improved aerobic capacity & endurance                [] Decreased pain               [] Increased pacing          [] Improved emotional health          [] Cigarette reduction / Smoking cessation participation            [] Improved self-monitoring at home          [] Improved nutritional awareness/monitoring          [] Other (Describe):             Additional Comments:    PLAN OF CARE [x] - Continue as written   OR    [] - Adjust treatment plan as follows:           Physician supervision provided this date of service by:  Dr. Armando Cole [id number] (non-monitored): Total minutes:    Code 80839 (monitored):             Total minutes: 95  *ECG rhythm attached      [] 1 CR session only   OR     []  2 CR sessions       [] 1 CR session only     OR     [x]  2 CR sessions       *EKG Strip:    Lobo Doe RN  5/17/2018, 11:40 AM

## 2018-05-22 ENCOUNTER — HOSPITAL ENCOUNTER (OUTPATIENT)
Dept: CARDIAC REHAB | Age: 78
Discharge: HOME | End: 2018-05-22
Payer: MEDICARE

## 2018-05-22 NOTE — PROGRESS NOTES
Chandrakant Galdamez called back to say that Dr. Nida Rasheed has added another BP med, Amlodipine 5 mg/day. She will let the patient know, and if BP is ok on Thursday, she can exercise.

## 2018-05-22 NOTE — PROGRESS NOTES
Patient arrived for Rehab, /75, HR 79, asymptomatic. Said she had called Dr. Marychuy Calixto office last week, and was told to start back taking Losartan, which she had stopped herself thinking she didn't need it after TAVR as she was on Plavix. She started the Losartan back on Friday, but BP is higher than before. She went into education class for 45 minutes, and I repeated VSS. Left arm - 184/80, Right arm - 177/86. Phoned Dr. Marychuy Calixto office and spoke with Miguel Lawrence, she will talk to Dr. Anamaria Murillo and call the patient and me back with his advise. Told Marley that Dr. Radha Santa is her Cardiolgist and she had valve replacement surgery recently.

## 2018-05-24 ENCOUNTER — HOSPITAL ENCOUNTER (OUTPATIENT)
Dept: CARDIAC REHAB | Age: 78
Discharge: HOME | End: 2018-05-24
Payer: MEDICARE

## 2018-05-24 PROCEDURE — 93798 PHYS/QHP OP CAR RHAB W/ECG: CPT

## 2018-05-24 NOTE — PROGRESS NOTES
[facility] Service Date: [id number] Session #:  8 Time In:  1100 Time Out: 1200 O2 with therapy:                            L/Min. Patients carry-over of treatment evident by: Pt. Got RX for Amlodipine for HTN, after visit here on Tuesday. She has started the med. OBJECTIVE Objective Comments: none   Skin [x] - Warm    [] - Cool     [] - Dry   [] - Moist    [] - Dusky   [] - Pale   [] - Cyanosis   Dependent Edema: [x] - None   OR Location:   Blood Glucose: [x]- NA      Fasting:   Pre-Exercise:  Post-Exercise:   Medications taken as prescribed:   [x] - Yes              [] - No Medication Comments: New med for HTN, started 5/22/18:  Amlodipine 5 mg.    Pre SpO2:  100     Pre HR:  93 Pre BP:  157/75 Chest Pain:  0 Weight:  116 Other Pain (1-10) and Location:  1   Post SpO2:   99       Post HR:  81 Post BP:  139/69 Chest Pain: 0 Other Pain (1-10) and Location:  1   ECG interpretation at rest: NSR   ECG interpretation post-exercise:  NSR                 Self-Care/ Education   []-CHF/Heart Disease         []- Heart Attack Warning Signs        [x]- Exercise Safety        []- Risk Factors for CAD        [x]- Medications   []- Breathing Techniques    [x]- Collaborative Self-Mgt                 [x]- Hypertension            []-Adequate Sleep                []-  Nutrition                                              []-Cholesterol and Lipids    []- Stress Mgt/Emotional Health      []-Diabetes Mellitus       []-Intimacy Concerns with Heart Disease         []-Travel/Environment         []-Returning pre-cardiac activities   []-Home Exercise    []-Fall Precautions   []-Other:     Patient Response []- NA [x]- Patient actively participated in education    []- Patient disengaged during education     [] - Able to recite main objectives    [] - Unable to recite main objectives [] - Able to demonstrate     [] - Unable to demonstrate   Educational comments:    EXERCISE PRESCRIPTION: MODALITY   Time   (min) Speed/Level During Exercise Post Exercise      HR PDS   1-10 PES  1-10 Pain  1-10 HR PDS   1-10 PES  1-10 Pain  1-10   stretching/monitoring/deep breathing 10  92 1 1 1 90 1 1 1   Arm Bike 20 5 102 1 4 1 96 1 1 1   TM 20 0.9 99 1 4 1 92 1 1 1   Nustep 10 3 98 1 4 1 95 1 1 1                   Session # Time in: Time out:   MODALITY   Time   (min) Speed/Level During Exercise Post Exercise      HR PDS   1-10 PES  1-10 Pain  1 - 10 HR PDS   1-10 PES  1-10 Pain   1 - 10   stretching/monitoring/deep breathing                                                                           Patient Response to Exercise Prescription   []- ? Dyspnea                        [] - ? Fatigue                              [] - Abnormal responses:  Explain         [] - ? Pain (Describe location, description of pain, action(s) taken:         [x] - Experienced no problems; no cuing required            [x]- Good effort               []- Fair effort                   []- Poor   effort        [x]- Good motivation         []- Fair motivation                 []- Poor motivation         [x] - Appropriate physiological responses                             Exercise Comments:    ASSESSMENT   []- Patient unable to  progress towards  LTGs due to:  []- Illness       []- Weakness/debility       []- Poor effort       []- Poor Motivation       []- Poor Attendance        [x]- Patient progressing towards LTGs as evident by:    [] Decreased dyspnea on exertion                 [] Decreased fatigue                  [x] Improved strength                [x] Improved aerobic capacity & endurance                [] Decreased pain               [] Increased pacing          [] Improved emotional health          [] Cigarette reduction / Smoking cessation participation            [] Improved self-monitoring at home          [] Improved nutritional awareness/monitoring          [] Other (Describe):             Additional Comments:    PLAN OF CARE [x] - Continue as written   OR    [] - Adjust treatment plan as follows:           Physician supervision provided this date of service by:  Dr. Libia Reynolds [id number] (non-monitored): Total minutes:    Code 85373 (monitored):             Total minutes: 60  *ECG rhythm attached      [] 1 CR session only   OR     []  2 CR sessions       [x] 1 CR session only     OR     []  2 CR sessions       *EKG Strip:    Suma Anderson RN  5/24/2018, 2:06 PM

## 2018-05-29 ENCOUNTER — HOSPITAL ENCOUNTER (OUTPATIENT)
Dept: CARDIAC REHAB | Age: 78
Discharge: HOME | End: 2018-05-29
Payer: MEDICARE

## 2018-05-29 PROCEDURE — 93798 PHYS/QHP OP CAR RHAB W/ECG: CPT

## 2018-05-29 NOTE — PROGRESS NOTES
[place] [facility] Service Date: [id number] Session #:  9 Time In:  1000 Time Out: 1100 O2 with therapy:                            L/Min. Patients carry-over of treatment evident by: Pt. Got exercise by walking her 2 dogs over the holiday weekend.      OBJECTIVE Objective Comments: none   Skin [x] - Warm    [] - Cool     [] - Dry   [] - Moist    [] - Dusky   [] - Pale   [] - Cyanosis   Dependent Edema: [x] - None   OR Location:   Blood Glucose: [x]- NA      Fasting:   Pre-Exercise:  Post-Exercise:   Medications taken as prescribed:   [x] - Yes              [] - No Medication Comments:   Pre SpO2:  100     Pre HR:  86 Pre BP:  154/71 Chest Pain:  0 Weight:  114.8 Other Pain (1-10) and Location:  0   Post SpO2:   100       Post HR:  81 Post BP:  157/77 Chest Pain: 0 Other Pain (1-10) and Location:  1   ECG interpretation at rest: NSR   ECG interpretation post-exercise:  NSR                 Self-Care/ Education   [x]-CHF/Heart Disease         []- Heart Attack Warning Signs        [x]- Exercise Safety        [x]- Risk Factors for CAD        [x]- Medications   []- Breathing Techniques    [x]- Collaborative Self-Mgt                 []- Hypertension            []-Adequate Sleep                []-  Nutrition                                              []-Cholesterol and Lipids    []- Stress Mgt/Emotional Health      []-Diabetes Mellitus       []-Intimacy Concerns with Heart Disease         []-Travel/Environment         []-Returning pre-cardiac activities   [x]-Home Exercise    []-Fall Precautions   []-Other:     Patient Response []- NA [x]- Patient actively participated in education    []- Patient disengaged during education     [] - Able to recite main objectives    [] - Unable to recite main objectives [] - Able to demonstrate     [] - Unable to demonstrate   Educational comments:    EXERCISE PRESCRIPTION:        MODALITY   Time   (min) Speed/Level During Exercise Post Exercise      HR PDS 1-10 PES  1-10 Pain  1-10 HR PDS   1-10 PES  1-10 Pain  1-10   stretching/monitoring/deep breathing 16  84 1 1 1 81 1 1 1   Arm Bike 22 5 105 1 4 1 96 1 1 1   TM 22 1.7 105 1 4 1 98 1 1 1                                Session #10 Time in:1100 Time out:1140   MODALITY   Time   (min) Speed/Level During Exercise Post Exercise      HR PDS   1-10 PES  1-10 Pain  1 - 10 HR PDS   1-10 PES  1-10 Pain   1 - 10   stretching/monitoring/deep breathing 15  90 1 1 1 85 1 1 1   Nustep 22 3 92 1 4 1 84 1 1 1                                          Patient Response to Exercise Prescription   []- ? Dyspnea                        [] - ? Fatigue                              [] - Abnormal responses:  Explain         [] - ? Pain (Describe location, description of pain, action(s) taken:         [x] - Experienced no problems; no cuing required            [x]- Good effort               []- Fair effort                   []- Poor   effort        [x]- Good motivation         []- Fair motivation                 []- Poor motivation         [] - Appropriate physiological responses                             Exercise Comments:    ASSESSMENT   []- Patient unable to  progress towards  LTGs due to:  []- Illness       []- Weakness/debility       []- Poor effort       []- Poor Motivation       []- Poor Attendance        [x]- Patient progressing towards LTGs as evident by:    [] Decreased dyspnea on exertion                 [] Decreased fatigue                  [x] Improved strength                [x] Improved aerobic capacity & endurance                [] Decreased pain               [x] Increased pacing          [x] Improved emotional health          [] Cigarette reduction / Smoking cessation participation            [] Improved self-monitoring at home          [x] Improved nutritional awareness/monitoring          [] Other (Describe):             Additional Comments:    PLAN OF CARE [x] - Continue as written   OR    [] - Adjust treatment plan as follows:           Physician supervision provided this date of service by:    Dr. Kandis Carty     BILLING Code 66721 (non-monitored): Total minutes:    Code 46095 (monitored):             Total minutes: 97  *ECG rhythm attached      [] 1 CR session only   OR     []  2 CR sessions       [] 1 CR session only     OR     [x]  2 CR sessions       *EKG Strip:    Kevin Myles RN  5/29/2018, 11:19 AM

## 2018-05-30 ENCOUNTER — HOSPITAL ENCOUNTER (OUTPATIENT)
Dept: CARDIAC REHAB | Age: 78
Discharge: HOME | End: 2018-05-30
Payer: MEDICARE

## 2018-05-30 PROCEDURE — 93798 PHYS/QHP OP CAR RHAB W/ECG: CPT

## 2018-05-31 ENCOUNTER — APPOINTMENT (OUTPATIENT)
Dept: CARDIAC REHAB | Age: 78
End: 2018-05-31
Payer: MEDICARE

## 2018-06-05 ENCOUNTER — HOSPITAL ENCOUNTER (OUTPATIENT)
Dept: CARDIAC REHAB | Age: 78
Discharge: HOME | End: 2018-06-05
Payer: MEDICARE

## 2018-06-05 PROCEDURE — 93798 PHYS/QHP OP CAR RHAB W/ECG: CPT

## 2018-06-05 NOTE — PROGRESS NOTES
[place] [facility] Service Date: [id number] Session #:  13 Time In:  1000 Time Out: 1100 O2 with therapy:                            L/Min. Patients carry-over of treatment evident by: Pt. Enjoyed nutrition class today, already doing a great job with her diet, per dietitian.      OBJECTIVE Objective Comments: none   Skin [x] - Warm    [] - Cool     [] - Dry   [] - Moist    [] - Dusky   [] - Pale   [] - Cyanosis   Dependent Edema: [x] - None   OR Location:   Blood Glucose: [x]- NA      Fasting:   Pre-Exercise:  Post-Exercise:   Medications taken as prescribed:   [x] - Yes              [] - No Medication Comments:   Pre SpO2:  100     Pre HR:  93 Pre BP:  136/74 Chest Pain:  0 Weight:  113 Other Pain (1-10) and Location:  1   Post SpO2:   99       Post HR:  84 Post BP:  133/73 Chest Pain: 0 Other Pain (1-10) and Location:  1   ECG interpretation at rest: NSR   ECG interpretation post-exercise:  NSR                 Self-Care/ Education   []-CHF/Heart Disease         []- Heart Attack Warning Signs        [x]- Exercise Safety        []- Risk Factors for CAD        [x]- Medications   []- Breathing Techniques    []- Collaborative Self-Mgt                 []- Hypertension            []-Adequate Sleep                [x]-  Nutrition    - class                                          []-Cholesterol and Lipids    []- Stress Mgt/Emotional Health      []-Diabetes Mellitus       []-Intimacy Concerns with Heart Disease         []-Travel/Environment         []-Returning pre-cardiac activities   []-Home Exercise    []-Fall Precautions   []-Other:     Patient Response []- NA [x]- Patient actively participated in education    []- Patient disengaged during education     [] - Able to recite main objectives    [] - Unable to recite main objectives [] - Able to demonstrate     [] - Unable to demonstrate   Educational comments:    EXERCISE PRESCRIPTION:        MODALITY   Time   (min) Speed/Level During Exercise Post Exercise      HR PDS   1-10 PES  1-10 Pain  1-10 HR PDS   1-10 PES  1-10 Pain  1-10   stretching/monitoring/deep breathing 15  95 1 1 1 85 1 1 1   Diet Class 35  83 1 1 1 82 1 1 1   Arm bike 10 5 105 1 4 1 103 1 1 1                                Session #14 Time in:1100 Time out:1200   MODALITY   Time   (min) Speed/Level During Exercise Post Exercise      HR PDS   1-10 PES  1-10 Pain  1 - 10 HR PDS   1-10 PES  1-10 Pain   1 - 10   stretching/monitoring/deep breathing                       Arm Bike 13 5 107 1 4 1 102 1 1 1   TM 23 1.5 104 1 4 1 96 1 1 1   Nustep 18 1 95 1 4 1 92 1 1 1                Patient Response to Exercise Prescription   []- ? Dyspnea                        [] - ? Fatigue                              [] - Abnormal responses:  Explain         [] - ? Pain (Describe location, description of pain, action(s) taken:         [x] - Experienced no problems; no cuing required            [x]- Good effort               []- Fair effort                   []- Poor   effort        [x]- Good motivation         []- Fair motivation                 []- Poor motivation         [x] - Appropriate physiological responses                             Exercise Comments:    ASSESSMENT   []- Patient unable to  progress towards  LTGs due to:  []- Illness       []- Weakness/debility       []- Poor effort       []- Poor Motivation       []- Poor Attendance        []- Patient progressing towards LTGs as evident by:    [] Decreased dyspnea on exertion                 [] Decreased fatigue                  [x] Improved strength                [x] Improved aerobic capacity & endurance                [] Decreased pain               [x] Increased pacing          [] Improved emotional health          [] Cigarette reduction / Smoking cessation participation            [] Improved self-monitoring at home          [x] Improved nutritional awareness/monitoring          [] Other (Describe):             Additional Comments: PLAN OF CARE [x] - Continue as written   OR    [] - Adjust treatment plan as follows:           Physician supervision provided this date of service by:      Dr. Katherine Monahan   BILLING Code 04606 (non-monitored): Total minutes:    Code 87942 (monitored):             Total minutes: 114  *ECG rhythm attached      [] 1 CR session only   OR     []  2 CR sessions       [] 1 CR session only     OR     [x]  2 CR sessions       *EKG Strip:    Fatimah Day RN  6/5/2018, 11:36 AM

## 2018-06-07 ENCOUNTER — HOSPITAL ENCOUNTER (OUTPATIENT)
Dept: CARDIAC REHAB | Age: 78
Discharge: HOME | End: 2018-06-07
Payer: MEDICARE

## 2018-06-07 PROCEDURE — 93798 PHYS/QHP OP CAR RHAB W/ECG: CPT

## 2018-06-07 NOTE — PROGRESS NOTES
[place] [facility] Service Date: [id number] Session #:  15 Time In:  1000 Time Out: 1100 O2 with therapy:                            L/Min. Patients carry-over of treatment evident by: Pt. Plans to go away for a month in July, so has asked to graduate from rehab on July 3. She has made great progress regaining strength and endurance. Has excellent diet.    OBJECTIVE Objective Comments: none   Skin [x] - Warm    [] - Cool     [] - Dry   [] - Moist    [] - Dusky   [] - Pale   [] - Cyanosis   Dependent Edema: [x] - None   OR Location:   Blood Glucose: [x]- NA      Fasting:   Pre-Exercise:  Post-Exercise:   Medications taken as prescribed:   [x] - Yes              [] - No Medication Comments:   Pre SpO2:       Pre HR:  91 Pre BP:  138/76 Chest Pain:  0 Weight:  113 Other Pain (1-10) and Location:  1   Post SpO2:          Post HR:  89 Post BP:  143/77 Chest Pain: 0 Other Pain (1-10) and Location:  1   ECG interpretation at rest: NSR   ECG interpretation post-exercise:  NSR                 Self-Care/ Education   []-CHF/Heart Disease         []- Heart Attack Warning Signs        [x]- Exercise Safety        []- Risk Factors for CAD        [x]- Medications   []- Breathing Techniques    []- Collaborative Self-Mgt                 []- Hypertension            []-Adequate Sleep                []-  Nutrition                                              []-Cholesterol and Lipids    []- Stress Mgt/Emotional Health      []-Diabetes Mellitus       []-Intimacy Concerns with Heart Disease         [x]-Travel/Environment         [x]-Returning pre-cardiac activities   []-Home Exercise    []-Fall Precautions   []-Other:     Patient Response []- NA [x]- Patient actively participated in education    []- Patient disengaged during education     [] - Able to recite main objectives    [] - Unable to recite main objectives [] - Able to demonstrate     [] - Unable to demonstrate   Educational comments:    EXERCISE PRESCRIPTION:        MODALITY   Time   (min) Speed/Level During Exercise Post Exercise      HR PDS   1-10 PES  1-10 Pain  1-10 HR PDS   1-10 PES  1-10 Pain  1-10   stretching/monitoring/deep breathing 14  85 1 1 1 82 1 1 1   Arms Bike 23 5 90 1 4 1 86 1 1 1   TM 23 1.5  1 4 1  1 1 1                                Session #16 Time in:1100 Time out:1142   MODALITY   Time   (min) Speed/Level During Exercise Post Exercise      HR PDS   1-10 PES  1-10 Pain  1 - 10 HR PDS   1-10 PES  1-10 Pain   1 - 10   stretching/monitoring/deep breathing                       Nustep 23 6 103 1 4 1 98 1 1 1   Rec Bike 15 39 99 1 4 1 92 1 1 1                             Patient Response to Exercise Prescription   []- ? Dyspnea                        [] - ? Fatigue                              [] - Abnormal responses:  Explain         [] - ?  Pain (Describe location, description of pain, action(s) taken:         [x] - Experienced no problems; no cuing required            [x]- Good effort               []- Fair effort                   []- Poor   effort        [x]- Good motivation         []- Fair motivation                 []- Poor motivation         [x] - Appropriate physiological responses                             Exercise Comments:    ASSESSMENT   []- Patient unable to  progress towards  LTGs due to:  []- Illness       []- Weakness/debility       []- Poor effort       []- Poor Motivation       []- Poor Attendance        [x]- Patient progressing towards LTGs as evident by:    [] Decreased dyspnea on exertion                 [] Decreased fatigue                  [x] Improved strength                [x] Improved aerobic capacity & endurance                [] Decreased pain               [x] Increased pacing          [] Improved emotional health          [] Cigarette reduction / Smoking cessation participation            [] Improved self-monitoring at home          [] Improved nutritional awareness/monitoring          [] Other (Describe): Additional Comments:    PLAN OF CARE [x] - Continue as written   OR    [] - Adjust treatment plan as follows:           Physician supervision provided this date of service by:    Dr. Leander Javed     BILLING Code 51941 (non-monitored): Total minutes:    Code 31189 (monitored):             Total minutes: 98  *ECG rhythm attached      [] 1 CR session only   OR     []  2 CR sessions       [] 1 CR session only     OR     [x]  2 CR sessions       *EKG Strip:    Shabbir Denny RN  6/7/2018, 10:22 AM

## 2018-06-12 ENCOUNTER — HOSPITAL ENCOUNTER (OUTPATIENT)
Dept: CARDIAC REHAB | Age: 78
Discharge: HOME | End: 2018-06-12
Payer: MEDICARE

## 2018-06-12 PROCEDURE — 93798 PHYS/QHP OP CAR RHAB W/ECG: CPT

## 2018-06-12 NOTE — PROGRESS NOTES
[place] [facility] Service Date: [id number] Session #:  17 Time In:  1000 Time Out: 1100 O2 with therapy:                            L/Min. Patients carry-over of treatment evident by: Pt. Hydrates well, and walks her dogs several times per day to get exercise.      OBJECTIVE Objective Comments: none   Skin [x] - Warm    [] - Cool     [] - Dry   [] - Moist    [] - Dusky   [] - Pale   [] - Cyanosis   Dependent Edema: [x] - None   OR Location:   Blood Glucose: [x]- NA      Fasting:   Pre-Exercise:  Post-Exercise:   Medications taken as prescribed:   [x] - Yes              [] - No Medication Comments:   Pre SpO2:       Pre HR:  93 Pre BP:  166/80 Chest Pain:  0 Weight:  115 Other Pain (1-10) and Location:  1   Post SpO2:         Post HR:  81 Post BP:  136/71 Chest Pain: 0 Other Pain (1-10) and Location:  1   ECG interpretation at rest: NSR   ECG interpretation post-exercise:  NSR                 Self-Care/ Education   []-CHF/Heart Disease         []- Heart Attack Warning Signs        [x]- Exercise Safety        []- Risk Factors for CAD        [x]- Medications   []- Breathing Techniques    []- Collaborative Self-Mgt                 []- Hypertension            []-Adequate Sleep                []-  Nutrition                                              []-Cholesterol and Lipids    [x]- Stress Mgt/Emotional Health      []-Diabetes Mellitus       []-Intimacy Concerns with Heart Disease         [x]-Travel/Environment         []-Returning pre-cardiac activities   [x]-Home Exercise    []-Fall Precautions   []-Other:     Patient Response []- NA [x]- Patient actively participated in education    []- Patient disengaged during education     [] - Able to recite main objectives    [] - Unable to recite main objectives [] - Able to demonstrate     [] - Unable to demonstrate   Educational comments:    EXERCISE PRESCRIPTION:        MODALITY   Time   (min) Speed/Level During Exercise Post Exercise      HR PDS   1-10 PES  1-10 Pain  1-10 HR PDS   1-10 PES  1-10 Pain  1-10   stretching/monitoring/deep breathing 14  90 1 1 1 88 1 1 1   Arm Bike 23 5 111 1 4 1 103 1 1 1   TM 23 1.7 107 1 4 1 96 1 1 1                                Session #18 Time in:1100 Time out:1135   MODALITY   Time   (min) Speed/Level During Exercise Post Exercise      HR PDS   1-10 PES  1-10 Pain  1 - 10 HR PDS   1-10 PES  1-10 Pain   1 - 10   stretching/monitoring/deep breathing 12  92 1 1 1 90 1 1 1   Nustep 23 7 99 1 4 1 91 1 1 1                                          Patient Response to Exercise Prescription   []- ? Dyspnea                        [] - ? Fatigue                              [] - Abnormal responses:  Explain         [] - ? Pain (Describe location, description of pain, action(s) taken:         [x] - Experienced no problems; no cuing required            [x]- Good effort               []- Fair effort                   []- Poor   effort        [x]- Good motivation         []- Fair motivation                 []- Poor motivation         [x] - Appropriate physiological responses                             Exercise Comments:    ASSESSMENT   []- Patient unable to  progress towards  LTGs due to:  []- Illness       []- Weakness/debility       []- Poor effort       []- Poor Motivation       []- Poor Attendance        [x]- Patient progressing towards LTGs as evident by:    [] Decreased dyspnea on exertion                 [] Decreased fatigue                  [x] Improved strength                [x] Improved aerobic capacity & endurance                [] Decreased pain               [x] Increased pacing          [] Improved emotional health          [] Cigarette reduction / Smoking cessation participation            [] Improved self-monitoring at home          [] Improved nutritional awareness/monitoring          [] Other (Describe):             Additional Comments:    PLAN OF CARE [x] - Continue as written   OR    [] - Adjust treatment plan as follows:           Physician supervision provided this date of service by:      Dr. Rah Torres [id number] (non-monitored): Total minutes:    Code 38167 (monitored):             Total minutes: 95  *ECG rhythm attached      [] 1 CR session only   OR     []  2 CR sessions       [] 1 CR session only     OR     [x]  2 CR sessions       *EKG Strip:    Moshe So RN  6/12/2018, 11:42 AM

## 2018-06-14 ENCOUNTER — HOSPITAL ENCOUNTER (OUTPATIENT)
Dept: CARDIAC REHAB | Age: 78
Discharge: HOME | End: 2018-06-14
Payer: MEDICARE

## 2018-06-14 PROCEDURE — 93798 PHYS/QHP OP CAR RHAB W/ECG: CPT

## 2018-06-14 NOTE — PROGRESS NOTES
[place] [facility] Service Date: [id number] Session #:  19 Time In:  1000 Time Out: 1100 O2 with therapy:                            L/Min. Patients carry-over of treatment evident by: Pt. Has lost her glasses, thinks her  threw in garbage by mistake, will have to have eye exam to get new glasses, very frustrated. OBJECTIVE Objective Comments: none   Skin [x] - Warm    [] - Cool     [] - Dry   [] - Moist    [] - Dusky   [] - Pale   [] - Cyanosis   Dependent Edema: [x] - None   OR Location:   Blood Glucose: [x]- NA      Fasting:   Pre-Exercise:  Post-Exercise:   Medications taken as prescribed:   [x] - Yes              [] - No Medication Comments:   Pre SpO2:       Pre HR:  99 Pre BP:  142/72 Chest Pain:  0 Weight:  114 Other Pain (1-10) and Location:  1   Post SpO2:          Post HR:  85 Post BP:  148/72 Chest Pain: 0 Other Pain (1-10) and Location:  1   ECG interpretation at rest: NSR   ECG interpretation post-exercise:  NSR with pac's                 Self-Care/ Education   []-CHF/Heart Disease         []- Heart Attack Warning Signs        [x]- Exercise Safety        []- Risk Factors for CAD        [x]- Medications   []- Breathing Techniques    []- Collaborative Self-Mgt                 []- Hypertension            []-Adequate Sleep                []-  Nutrition                                              []-Cholesterol and Lipids    [x]- Stress Mgt/Emotional Health      []-Diabetes Mellitus       []-Intimacy Concerns with Heart Disease         []-Travel/Environment         []-Returning pre-cardiac activities   []-Home Exercise    []-Fall Precautions   [x]-Other: spoke about allowing minor things like getting everywhere early is causing stress in her life, and letting go of some of the smaller stressors.      Patient Response []- NA [x]- Patient actively participated in education    []- Patient disengaged during education     [] - Able to recite main objectives    [] - Unable to recite main objectives [] - Able to demonstrate     [] - Unable to demonstrate   Educational comments:    EXERCISE PRESCRIPTION:        MODALITY   Time   (min) Speed/Level During Exercise Post Exercise      HR PDS   1-10 PES  1-10 Pain  1-10 HR PDS   1-10 PES  1-10 Pain  1-10   stretching/monitoring/deep breathing 25  94 1 1 1 90 1 1 1   Arm Bike 23 10 103 1 4 1 98 1 1 1   TM 12 1.3 107 1 4 1 97 1 1 1                                Session # Time in:1100 Time out:1140   MODALITY   Time   (min) Speed/Level During Exercise Post Exercise      HR PDS   1-10 PES  1-10 Pain  1 - 10 HR PDS   1-10 PES  1-10 Pain   1 - 10   stretching/monitoring/deep breathing                       TM 13 1.3 106 1 4 1 95 1 1 1   Nustep 23 23 96 1 4 1 91 1 1 1                             Patient Response to Exercise Prescription   []- ? Dyspnea                        [] - ? Fatigue                              [] - Abnormal responses:  Explain         [] - ?  Pain (Describe location, description of pain, action(s) taken:         [x] - Experienced no problems; no cuing required            [x]- Good effort               []- Fair effort                   []- Poor   effort        [x]- Good motivation         []- Fair motivation                 []- Poor motivation         [x] - Appropriate physiological responses                             Exercise Comments:    ASSESSMENT   []- Patient unable to  progress towards  LTGs due to:  []- Illness       []- Weakness/debility       []- Poor effort       []- Poor Motivation       []- Poor Attendance        [x]- Patient progressing towards LTGs as evident by:    [] Decreased dyspnea on exertion                 [] Decreased fatigue                  [x] Improved strength                [x] Improved aerobic capacity & endurance                [] Decreased pain               [x] Increased pacing          [] Improved emotional health          [] Cigarette reduction / Smoking cessation participation [x] Improved self-monitoring at home          [] Improved nutritional awareness/monitoring          [] Other (Describe): Additional Comments:    PLAN OF CARE [x] - Continue as written   OR    [] - Adjust treatment plan as follows:           Physician supervision provided this date of service by:    Dr. Chavo NGUYEN Code 25027 (non-monitored): Total minutes:    Code 70444 (monitored):             Total minutes: 96  *ECG rhythm attached      [] 1 CR session only   OR     []  2 CR sessions       [] 1 CR session only     OR     [x]  2 CR sessions       *EKG Strip:    Laureano Trevino RN  6/14/2018, 12:00 PM

## 2018-06-18 ENCOUNTER — TELEPHONE (OUTPATIENT)
Dept: CARDIAC REHAB | Age: 78
End: 2018-06-18

## 2018-06-18 NOTE — TELEPHONE ENCOUNTER
[facility] called patient and left a message about her appointment being from 11-12 on 6/19/18 instead of 10-12.     Thank you,  Flori Joyner

## 2018-06-19 ENCOUNTER — HOSPITAL ENCOUNTER (OUTPATIENT)
Dept: CARDIAC REHAB | Age: 78
Discharge: HOME | End: 2018-06-19
Payer: MEDICARE

## 2018-06-19 PROCEDURE — 93798 PHYS/QHP OP CAR RHAB W/ECG: CPT

## 2018-06-19 NOTE — PROGRESS NOTES
[place] [facility] Service Date: [id number] Session #:  21 Time In:  1100 Time Out: 1200 O2 with therapy:                            L/Min. Patients carry-over of treatment evident by: Pt. Had been forgetting to take her Plavix, and found the bottle. Gave her a pill organizer box, and encouraged her to use it every day to make sure she is taking all the pills every day. Discussed the importance of Plavix, and her other drugs, being taken regularly. OBJECTIVE Objective Comments: none   Skin [x] - Warm    [] - Cool     [] - Dry   [] - Moist    [] - Dusky   [] - Pale   [] - Cyanosis   Dependent Edema: [x] - None   OR Location:   Blood Glucose: [x]- NA      Fasting:   Pre-Exercise:  Post-Exercise:   Medications taken as prescribed:   [x] - Yes              [] - No Medication Comments: Had forgotten to take Plavix for a while, back on it now.    Pre SpO2:       Pre HR:  93 Pre BP:  147/79 Chest Pain:  0 Weight:  114 Other Pain (1-10) and Location:  1   Post SpO2:          Post HR:  97 Post BP:  152/71 Chest Pain: 0 Other Pain (1-10) and Location:  1   ECG interpretation at rest: NSR   ECG interpretation post-exercise:  NSR with frequent PACs and occasional PVCs                 Self-Care/ Education   []-CHF/Heart Disease         []- Heart Attack Warning Signs        [x]- Exercise Safety        []- Risk Factors for CAD        [x]- Medications   []- Breathing Techniques    []- Collaborative Self-Mgt                 [x]- Hypertension            []-Adequate Sleep                []-  Nutrition                                              []-Cholesterol and Lipids    []- Stress Mgt/Emotional Health      []-Diabetes Mellitus       []-Intimacy Concerns with Heart Disease         []-Travel/Environment         []-Returning pre-cardiac activities   []-Home Exercise    []-Fall Precautions   []-Other:     Patient Response []- NA [x]- Patient actively participated in education    []- Patient disengaged during education     [] - Able to recite main objectives    [] - Unable to recite main objectives [] - Able to demonstrate     [] - Unable to demonstrate   Educational comments:    EXERCISE PRESCRIPTION:        MODALITY   Time   (min) Speed/Level During Exercise Post Exercise      HR PDS   1-10 PES  1-10 Pain  1-10 HR PDS   1-10 PES  1-10 Pain  1-10   stretching/monitoring/deep breathing 14  92 1 1 1 89 1 1 1   Arm Bike 23 5 103 1 5 1 90 1 1 1   TM 23 1.3 108 1 4 1 98 1 1 1                                Session # Time in: Time out:   MODALITY   Time   (min) Speed/Level During Exercise Post Exercise      HR PDS   1-10 PES  1-10 Pain  1 - 10 HR PDS   1-10 PES  1-10 Pain   1 - 10   stretching/monitoring/deep breathing                                                                           Patient Response to Exercise Prescription   []- ? Dyspnea                        [] - ? Fatigue                              [] - Abnormal responses:  Explain         [] - ?  Pain (Describe location, description of pain, action(s) taken:         [x] - Experienced no problems; no cuing required            [x]- Good effort               []- Fair effort                   []- Poor   effort        [x]- Good motivation         []- Fair motivation                 []- Poor motivation         [x] - Appropriate physiological responses                             Exercise Comments:    ASSESSMENT   []- Patient unable to  progress towards  LTGs due to:  []- Illness       []- Weakness/debility       []- Poor effort       []- Poor Motivation       []- Poor Attendance        [x]- Patient progressing towards LTGs as evident by:    [] Decreased dyspnea on exertion                 [] Decreased fatigue                  [x] Improved strength                [x] Improved aerobic capacity & endurance                [] Decreased pain               [x] Increased pacing          [] Improved emotional health          [] Cigarette reduction / Smoking cessation participation            [x] Improved self-monitoring at home          [] Improved nutritional awareness/monitoring          [] Other (Describe): Additional Comments:    PLAN OF CARE [x] - Continue as written   OR    [] - Adjust treatment plan as follows:           Physician supervision provided this date of service by:      Dr. Oralia Carrasco   BILLING Code 91710 (non-monitored): Total minutes:    Code 03804 (monitored):             Total minutes: 60  *ECG rhythm attached      [] 1 CR session only   OR     []  2 CR sessions       [x] 1 CR session only     OR     []  2 CR sessions       *EKG Strip:    Nilam Yap RN  6/19/2018, 12:56 PM

## 2018-06-21 ENCOUNTER — HOSPITAL ENCOUNTER (OUTPATIENT)
Dept: CARDIAC REHAB | Age: 78
Discharge: HOME | End: 2018-06-21
Payer: MEDICARE

## 2018-06-21 VITALS — WEIGHT: 128 LBS | BODY MASS INDEX: 23.41 KG/M2

## 2018-06-21 PROCEDURE — 93798 PHYS/QHP OP CAR RHAB W/ECG: CPT

## 2018-06-26 ENCOUNTER — HOSPITAL ENCOUNTER (OUTPATIENT)
Dept: CARDIAC REHAB | Age: 78
Discharge: HOME | End: 2018-06-26
Payer: MEDICARE

## 2018-06-26 PROCEDURE — 93798 PHYS/QHP OP CAR RHAB W/ECG: CPT

## 2018-06-27 ENCOUNTER — HOSPITAL ENCOUNTER (OUTPATIENT)
Dept: CT IMAGING | Age: 78
Discharge: HOME | End: 2018-06-27
Attending: FAMILY MEDICINE
Payer: MEDICARE

## 2018-06-27 DIAGNOSIS — I72.2 ANEURYSM OF RENAL ARTERY (HCC): ICD-10-CM

## 2018-06-27 LAB — CREAT UR-MCNC: 0.7 MG/DL (ref 0.6–1.3)

## 2018-06-27 PROCEDURE — 74011636320 HC RX REV CODE- 636/320: Performed by: FAMILY MEDICINE

## 2018-06-27 PROCEDURE — 82565 ASSAY OF CREATININE: CPT

## 2018-06-27 PROCEDURE — 74175 CTA ABDOMEN W/CONTRAST: CPT

## 2018-06-27 RX ADMIN — IOPAMIDOL 100 ML: 612 INJECTION, SOLUTION INTRAVENOUS at 13:54

## 2018-06-28 ENCOUNTER — HOSPITAL ENCOUNTER (OUTPATIENT)
Dept: CARDIAC REHAB | Age: 78
Discharge: HOME | End: 2018-06-28
Payer: MEDICARE

## 2018-06-28 PROCEDURE — 93798 PHYS/QHP OP CAR RHAB W/ECG: CPT

## 2018-07-03 ENCOUNTER — HOSPITAL ENCOUNTER (OUTPATIENT)
Dept: CARDIAC REHAB | Age: 78
Discharge: HOME | End: 2018-07-03
Payer: MEDICARE

## 2018-07-03 VITALS — WEIGHT: 114 LBS | BODY MASS INDEX: 20.85 KG/M2

## 2018-07-03 PROCEDURE — 93798 PHYS/QHP OP CAR RHAB W/ECG: CPT

## 2018-07-05 ENCOUNTER — OFFICE VISIT (OUTPATIENT)
Dept: ORTHOPEDIC SURGERY | Age: 78
End: 2018-07-05

## 2018-07-05 VITALS
HEIGHT: 62 IN | BODY MASS INDEX: 21.57 KG/M2 | DIASTOLIC BLOOD PRESSURE: 79 MMHG | SYSTOLIC BLOOD PRESSURE: 174 MMHG | TEMPERATURE: 97 F | OXYGEN SATURATION: 98 % | WEIGHT: 117.2 LBS | HEART RATE: 79 BPM

## 2018-07-05 DIAGNOSIS — Z90.5 S/P NEPHRECTOMY: ICD-10-CM

## 2018-07-05 DIAGNOSIS — M47.816 LUMBAR FACET ARTHROPATHY: ICD-10-CM

## 2018-07-05 DIAGNOSIS — Z79.01 CHRONIC ANTICOAGULATION: ICD-10-CM

## 2018-07-05 DIAGNOSIS — G89.29 OTHER CHRONIC PAIN: ICD-10-CM

## 2018-07-05 DIAGNOSIS — Z79.891 USE OF OPIATES FOR THERAPEUTIC PURPOSES: ICD-10-CM

## 2018-07-05 DIAGNOSIS — G89.29 OTHER CHRONIC PAIN: Primary | ICD-10-CM

## 2018-07-05 RX ORDER — ACETAMINOPHEN AND CODEINE PHOSPHATE 300; 30 MG/1; MG/1
TABLET ORAL
Qty: 60 TAB | Refills: 0 | Status: SHIPPED | OUTPATIENT
Start: 2018-07-05 | End: 2018-08-06 | Stop reason: SDUPTHER

## 2018-07-05 RX ORDER — AMLODIPINE BESYLATE 5 MG/1
5 TABLET ORAL DAILY
COMMUNITY

## 2018-07-05 RX ORDER — PREDNISONE 10 MG/1
TABLET ORAL
COMMUNITY
End: 2018-07-31 | Stop reason: ALTCHOICE

## 2018-07-05 NOTE — MR AVS SNAPSHOT
303 [facility] 
 
 
 [address] 200 [facility] 
[phone] Patient: Paramjit Dudley MRN: [id number] Visit Information Date & Time Provider Department Dept. Phone Encounter #  
 7/5/2018  8:30 AM Arti Pulido, 27 [facility] [place] [phone] Follow-up Instructions Return in about 3 months (around 10/5/2018) for Medication follow up. Your Appointments 7/31/2018 11:00 AM  
Follow Up with Nnamdi Hoffman DO [facility] [place] [address] ([facility]) Appt Note: 6 month f/u  
 [address] [id number]  
[phone] [phone]  
  
    
 10/17/2018  9:30 AM  
[facility]ASOUND with [facility] Appt Note: Return in about 1 year (around 10/20/2018) for FU with prior renal US with Dr Barr  
 301 [facility] 2201 [facility] 2 [facility] [phone]  
  
    
 10/17/2018 10:45 AM  
ESTABLISHED PATIENT with Roel Strickland MD  
[facility] ([facility]) Appt Note: Return in about 1 year (around 10/20/2018) for FU with prior renal US with Dr Barr  
 301 [facility], [address] 2201 [facility] [address]  
  
   
 301 [facility], 81 [facility] [address] Upcoming Health Maintenance Date Due DTaP/Tdap/Td series (1 - Tdap) 2/21/1963 ZOSTER VACCINE AGE 60> 12/21/2001 GLAUCOMA SCREENING Q2Y 2/21/2007 Pneumococcal 65+ Low/Medium Risk (1 of 2 - PCV13) 2/21/2007 MEDICARE YEARLY EXAM 3/14/2018 Influenza Age 5 to Adult 8/1/2018 Allergies as of 7/5/2018  Review Complete On: 7/5/2018 By: Maxx Loyns Severity Noted Reaction Type Reactions Vicodin [Hydrocodone-acetaminophen]  09/15/2015    Nausea and Vomiting Current Immunizations  Reviewed on 2/22/2015 No immunizations on file. Not reviewed this visit You Were Diagnosed With   
  
 Codes Comments Other chronic pain    -  Primary ICD-10-CM: G89.29 ICD-9-CM: 338.29 Use of opiates for therapeutic purposes     ICD-10-CM: Z79.891 ICD-9-CM: V58.69 Lumbar facet arthropathy (HCC)     ICD-10-CM: M46.96 
ICD-9-CM: 721.3 S/p nephrectomy     ICD-10-CM: Z90.5 ICD-9-CM: V45.73 Vitals BP Pulse Temp Height(growth percentile) Weight(growth percentile) SpO2  
 174/79 79 97 °F (36.1 °C) (Oral) 5' 2\" (1.575 m) 117 lb 3.2 oz (53.2 kg) 98% BMI OB Status Smoking Status 21.44 kg/m2 Hysterectomy Never Smoker BMI and BSA Data Body Mass Index Body Surface Area  
 21.44 kg/m 2 1.53 m 2 Preferred Pharmacy [phone] [facility] [address], [address] [facility] [address] AT [address] [phone] Your Updated Medication List  
  
   
This list is accurate as of 7/5/18  9:03 AM.  Always use your most recent med list.  
  
  
  
  
 acetaminophen-codeine 300-30 mg per tablet Commonly known as:  TYLENOL #3  
1 po bid as needed for severe pain  Indications: Pain AEROBID IN Take 1 Puff by inhalation daily. albuterol 90 mcg/actuation inhaler Commonly known as:  PROVENTIL HFA, VENTOLIN HFA, PROAIR HFA Take 2 Puffs by inhalation as needed. amLODIPine 5 mg tablet Commonly known as:  Jeyson Conine Take 5 mg by mouth daily. aspirin delayed-release 81 mg tablet Take  by mouth daily. ASTEPRO 0.15 % (205.5 mcg) nasal spray Generic drug:  Azelastine 1 Spray by Both Nostrils route two (2) times a day. atorvastatin 80 mg tablet Commonly known as:  LIPITOR Take 1 Tab by mouth daily. cholecalciferol 50,000 unit capsule Commonly known as:  VITAMIN D3  
 Take 1 Cap by mouth every seven (7) days. DULERA 100-5 mcg/actuation HFA inhaler Generic drug:  mometasone-formoterol Take 2 Puffs by inhalation two (2) times a day. ergocalciferol 50,000 unit capsule Commonly known as:  ERGOCALCIFEROL Take 50,000 Units by mouth every seven (7) days. Ferrous Fumarate 325 mg (106 mg iron) Tab Take  by mouth. ferrous sulfate 325 mg (65 mg iron) Cper Take  by mouth. FLONASE 50 mcg/actuation nasal spray Generic drug:  fluticasone 2 Sprays by Both Nostrils route nightly. fluticasone-salmeterol 113-14 mcg/actuation Aepb INL 1 PUFF PO TWICE DAILY. RM AFTER U  
  
 ipratropium 0.03 % nasal spray Commonly known as:  ATROVENT  
2 Sprays by Both Nostrils route every twelve (12) hours. LASIX 20 mg tablet Generic drug:  furosemide Take  by mouth daily. LINZESS 145 mcg Cap capsule Generic drug:  linaclotide Take 145 mcg by mouth daily. losartan 100 mg tablet Commonly known as:  COZAAR Take 100 mg by mouth daily. meloxicam 15 mg tablet Commonly known as:  MOBIC Take 15 mg by mouth daily. montelukast 10 mg tablet Commonly known as:  SINGULAIR TK 1 T PO QD IN THE NANCY * nitroglycerin 2 % ointment Commonly known as:  NITROBID Apply 1 Inch to affected area as needed. * nitroglycerin 400 mcg/spray spray Commonly known as:  NITROLINGUAL  
1 spray by SubLINGual route every five (5) minutes as needed. PLAVIX 75 mg Tab Generic drug:  clopidogrel Take  by mouth.  
  
 predniSONE 10 mg tablet Commonly known as:  Jing Esters Take  by mouth daily (with breakfast). PROBIOTIC PO Take 1 Tab by mouth daily. SYSTANE OP Apply  to eye as needed. VITAMIN B-12 INJECTION  
1,000 mcg by Injection route every thirty (30) days. ZyrTEC 10 mg tablet Generic drug:  cetirizine Take 10 mg by mouth as needed. * Notice: This list has 2 medication(s) that are the same as other medications prescribed for you. Read the directions carefully, and ask your doctor or other care provider to review them with you. Prescriptions Printed Refills  
 acetaminophen-codeine (TYLENOL #3) 300-30 mg per tablet 0 Si po bid as needed for severe pain  Indications: Pain Class: Print Follow-up Instructions Return in about 3 months (around 10/5/2018) for Medication follow up. To-Do List   
 2018 Lab:  DRUG SCREEN UR - W/ CONFIRM   
  
 2018 10:00 AM  
  Appointment with [facility] at [place] [address] ([phone])  
  
 07/10/2018 10:00 AM  
  Appointment with [facility] at [place] [address] ([phone])  
  
 2018 10:00 AM  
  Appointment with [facility] at [place] [address] ([phone])  
  
 2018 10:00 AM  
  Appointment with [facility] at [place] [address] ([phone]) Patient Instructions Low Back Arthritis: Exercises Your Care Instructions Here are some examples of typical rehabilitation exercises for your condition. Start each exercise slowly. Ease off the exercise if you start to have pain. Your doctor or physical therapist will tell you when you can start these exercises and which ones will work best for you. When you are not being active, find a comfortable position for rest. Some people are comfortable on the floor or a medium-firm bed with a small pillow under their head and another under their knees. Some people prefer to lie on their side with a pillow between their knees. Don't stay in one position for too long. Take short walks (10 to 20 minutes) every 2 to 3 hours. Avoid slopes, hills, and stairs until you feel better. Walk only distances you can manage without pain, especially leg pain. How to do the exercises Pelvic tilt 1. Lie on your back with your knees bent. 2. \"Brace\" your stomach-tighten your muscles by pulling in and imagining your belly button moving toward your spine. 3. Press your lower back into the floor. You should feel your hips and pelvis rock back. 4. Hold for 6 seconds while breathing smoothly. 5. Relax and allow your pelvis and hips to rock forward. 6. Repeat 8 to 12 times. Back stretches 1. Get down on your hands and knees on the floor. 2. Relax your head and allow it to droop. Round your back up toward the ceiling until you feel a nice stretch in your upper, middle, and lower back. Hold this stretch for as long as it feels comfortable, or about 15 to 30 seconds. 3. Return to the starting position with a flat back while you are on your hands and knees. 4. Let your back sway by pressing your stomach toward the floor. Lift your buttocks toward the ceiling. 5. Hold this position for 15 to 30 seconds. 6. Repeat 2 to 4 times. Follow-up care is a key part of your treatment and safety. Be sure to make and go to all appointments, and call your doctor if you are having problems. It's also a good idea to know your test results and keep a list of the medicines you take. Where can you learn more? Go to http://meena-juan r.info/. Enter U645 in the search box to learn more about \"Low Back Arthritis: Exercises. \" Current as of: March 21, 2017 Content Version: 11.4 © 2607-5629 Healthwise, [employer]. Care instructions adapted under license by [employer] (which disclaims liability or warranty for this information). If you have questions about a medical condition or this instruction, always ask your healthcare professional. [address] any warranty or liability for your use of this information. Introducing [facility]!    
 Jeyson Pardo introduces [facility] [facility] patient portal. Now you can access parts of your medical record, email your doctor's office, and request medication refills online. 1. In your internet browser, go to [email or web address] 2. Click on the First Time User? Click Here link in the Sign In box. You will see the New Member Sign Up page. 3. Enter your [employer] Access Code exactly as it appears below. You will not need to use this code after youve completed the sign-up process. If you do not sign up before the expiration date, you must request a new code. · [employer] Access Code: [email or web address] Expires: 10/3/2018  8:19 AM 
 
4. Enter the last four digits of your Social Security Number (xxxx) and Date of Birth (mm/dd/yyyy) as indicated and click Submit. You will be taken to the next sign-up page. 5. Create a [employer] ID. This will be your [employer] login ID and cannot be changed, so think of one that is secure and easy to remember. 6. Create a [employer] password. You can change your password at any time. 7. Enter your Password Reset Question and Answer. This can be used at a later time if you forget your password. 8. Enter your e-mail address. You will receive e-mail notification when new information is available in [address] 9. Click Sign Up. You can now view and download portions of your medical record. 10. Click the Download Summary menu link to download a portable copy of your medical information. If you have questions, please visit the Frequently Asked Questions section of the [employer] website. Remember, [employer] is NOT to be used for urgent needs. For medical emergencies, dial 911. Now available from your iPhone and Android! Please provide this summary of care documentation to your next provider. Your primary care clinician is listed as NUVIA RODRIGUEZ. If you have any questions after today's visit, please call [phone].

## 2018-07-05 NOTE — PATIENT INSTRUCTIONS

## 2018-07-05 NOTE — PROGRESS NOTES
[facility]  [address] [place], [facility] [address]  Phone: [phone]  Fax: [phone]    Pt's YOB: 1940    ASSESSMENT   Diagnoses and all orders for this visit:    1. Other chronic pain  -     acetaminophen-codeine (TYLENOL #3) 300-30 mg per tablet; 1 po bid as needed for severe pain  Indications: Pain  -     DRUG SCREEN UR - W/ CONFIRM; Future    2. Use of opiates for therapeutic purposes  -     acetaminophen-codeine (TYLENOL #3) 300-30 mg per tablet; 1 po bid as needed for severe pain  Indications: Pain  -     DRUG SCREEN UR - W/ CONFIRM; Future    3. Lumbar facet arthropathy ([address]    4. S/p right nephrectomy for cancer in 1/10    5. Chronic anticoagulation         IMPRESSION AND PLAN:  Kyra Lambert is a 70 y.o. female with history of chronic lumbar pain. Pt has been prescribed Tylenol #3 and takes it as her pain warrants. She had an artificial heart valve placed on 03/20/2018 by Dr. Shannan Obregon and is on Plavix. 1) Pt was given information on lumbar arthritis exercises. 2) She received a refill of Tylenol #3 1 tab BID prn severe pain. 3) A UDS was obtained. 4) Ms. Kell Dunn has a reminder for a \"due or due soon\" health maintenance. I have asked that she contact her primary care provider, Ana María Peng MD, for follow-up on this health maintenance. 5)  demonstrated consistency with prescribing. 6) Last UDS from 10/11/2017 was consistent. 7) Pt will follow-up in 3 months or sooner if needed. 8) Pt is not a candidate for NSAIDs due to renal disease and use of Plavix. HISTORY OF PRESENT ILLNESS:  Kyra Lambert is a 70 y.o. female with history of chronic lumbar pain. Pt has been prescribed Tylenol #3 and takes it as her pain warrants. Of note, patient's MME/day is 4.5. She had an artificial heart valve placed on 03/20/2018 by Dr. Shannan Obregon and is on Plavix.  Pt notes that she completed physical therapy about 1 week ago and she is doing much better at this time. She no longer experiences dizziness, falling, or discomfort when leaning forward. She uses the Tylenol #3 to allow her to do her activities of daily living. Pt at this time desires to continue with current care. Pt states that she will be leaving for [place] this weekend. Pain Scale: 2/10    PCP: Memo Kumar MD     Past Medical History:   Diagnosis Date    Anemia NEC     Aneurysm ([address]     left kidney    Arthritis     Asthma     Cardiac catheterization 02/23/2015    R dominant. LM patent. mLAD 20%. CX patent. mRCA 20%. LVEDP 8.  EF 60%. Mild AS. RA 3/1. PA 24. W 6/5.  CI 2.94.    Cardiac echocardiogram 07/27/2016    EF 60-65%. No WMA. Mild LVH. Normal LV diastolic fx. Mild LAE. Mod-severe AS (mean grad 34 mmHg). AS has worsened since study of 1/20/15. Mild MR.  Cardiac nuclear imaging test 02/10/2015    No evidence of ischemia or prior infarction. Hyperdynamic LV fx.  EF >70%. No RWMA. Nondiagnostic EKG on pharm stress test.  Low risk.  Carotid duplex 01/12/2016    Mild < 50% bilateral ICA stenosis.  Chest tightness     exertional, in the setting of widely patent coronary arteries, possibly related to elevated left ventricular end-diastolic pressure or possibly to small-vessel disease, improved on Cardizem CD in place of Verapamil    Chronic kidney disease, stage III (moderate)     Essential hypertension     Glaucoma     Heart murmur     Hypercholesterolemia     Hypoglycemia     Raynaud's syndrome     Renal cell carcinoma (HCC)     Stage T1a [place]hman Grade 2 s/p right open partial nephrectomy 2/22/10      S/p nephrectomy 2/22/10    right    Scoliosis     Stroke [facility]) 2/2015    resid los of balance        Social History     Social History    Marital status:      Spouse name: N/A    Number of children: N/A    Years of education: N/A     Occupational History    Not on file.      Social History Main Topics    Smoking status: Never Smoker    Smokeless tobacco: Never Used    Alcohol use No    Drug use: No    Sexual activity: Yes     Partners: Male     Other Topics Concern    Not on file     Social History Narrative       Current Outpatient Prescriptions   Medication Sig Dispense Refill    predniSONE (DELTASONE) 10 mg tablet Take  by mouth daily (with breakfast).  Ferrous Fumarate 325 mg (106 mg iron) tab Take  by mouth.  amLODIPine (NORVASC) 5 mg tablet Take 5 mg by mouth daily.  acetaminophen-codeine (TYLENOL #3) 300-30 mg per tablet 1 po bid as needed for severe pain  Indications: Pain 60 Tab 0    furosemide (LASIX) 20 mg tablet Take  by mouth daily.  clopidogrel (PLAVIX) 75 mg tab Take  by mouth.  ferrous sulfate 325 mg (65 mg iron) cpER Take  by mouth.  ergocalciferol (ERGOCALCIFEROL) 50,000 unit capsule Take 50,000 Units by mouth every seven (7) days.  fluticasone-salmeterol 113-14 mcg/actuation aepb INL 1 PUFF PO TWICE DAILY. RM AFTER U  5    LINZESS 145 mcg cap capsule Take 145 mcg by mouth daily.  montelukast (SINGULAIR) 10 mg tablet TK 1 T PO QD IN THE NANCY  1    losartan (COZAAR) 100 mg tablet Take 100 mg by mouth daily.  aspirin delayed-release 81 mg tablet Take  by mouth daily.  atorvastatin (LIPITOR) 80 mg tablet Take 1 Tab by mouth daily. 90 Tab 3    nitroglycerin (NITROLINGUAL) 400 mcg/spray spray 1 spray by SubLINGual route every five (5) minutes as needed. 1 Bottle 1    mometasone-formoterol (DULERA) 100-5 mcg/actuation HFA inhaler Take 2 Puffs by inhalation two (2) times a day.  nitroglycerin (NITROBID) 2 % ointment Apply 1 Inch to affected area as needed.  FLUNISOLIDE (AEROBID IN) Take 1 Puff by inhalation daily.  LACTOBACILLUS ACIDOPHILUS (PROBIOTIC PO) Take 1 Tab by mouth daily.  PROPYLENE GLYCOL/ (SYSTANE OP) Apply  to eye as needed.       ipratropium (ATROVENT) 0.03 % nasal spray 2 Sprays by Both Nostrils route every twelve (12) hours.  fluticasone (FLONASE) 50 mcg/actuation nasal spray 2 Sprays by Both Nostrils route nightly.  albuterol (PROVENTIL HFA, VENTOLIN HFA) 90 mcg/actuation inhaler Take 2 Puffs by inhalation as needed.  Azelastine (ASTEPRO) 0.15 % (205.5 mcg) nasal spray 1 [place] by Both Nostrils route two (2) times a day.  cetirizine (ZYRTEC) 10 mg tablet Take 10 mg by mouth as needed.  meloxicam (MOBIC) 15 mg tablet Take 15 mg by mouth daily.  Cholecalciferol, Vitamin D3, 50,000 unit cap Take 1 Cap by mouth every seven (7) days.  CYANOCOBALAMIN, VITAMIN B-12, (VITAMIN B-12 IJ) 1,000 mcg by Injection route every thirty (30) days. Allergies   Allergen Reactions    Vicodin [Hydrocodone-Acetaminophen] Nausea and Vomiting         REVIEW OF SYSTEMS    Constitutional: Negative for fever, chills, or weight change. Respiratory: Negative for cough or shortness of breath. Cardiovascular: Negative for chest pain or palpitations. Gastrointestinal: Negative for acid reflux, change in bowel habits, or constipation. Genitourinary: Negative for dysuria and flank pain. Musculoskeletal: Positive for lumbar pain. Skin: Negative for rash. Neurological: Negative for headaches, dizziness, or numbness. Endo/Heme/Allergies: Negative for increased bruising. Psychiatric/Behavioral: Negative for difficulty with sleep. PHYSICAL EXAMINATION  Visit Vitals    /79    Pulse 79    Temp 97 °F (36.1 °C) (Oral)    Ht 5' 2\" (1.575 m)    Wt 117 lb 3.2 oz (53.2 kg)    SpO2 98%    BMI 21.44 kg/m2       Constitutional: Awake, alert, and in no acute distress. Neurological: 1+ symmetrical DTRs in the lower extremities. Sensation to light touch is intact. Skin: warm, dry, and intact. Musculoskeletal: Tenderness to palpation in the lower lumbar region. Moderate pain with extension and axial loading. No pain with internal or external rotation of her hips.  Negative straight leg raise bilaterally. Hip Flex  Quads Hamstrings Ankle DF EHL Ankle PF   Right +4/5 +4/5 +4/5 +4/5 +4/5 +4/5   Left +4/5 +4/5 +4/5 +4/5 +4/5 +4/5     IMAGING:    Lumbar spine MRI from 03/16/2016 was personally reviewed with the patient and demonstrated:  Results from [facility] on 03/16/16   MRI LUMB SPINE W WO CONT     Narrative MRI lumbar spine with and without contrast    COMPARISON: CT chest abdomen and pelvis October 6, 2011    CLINICAL INFORMATION: Progressive back pain, left leg pain. PROCEDURE:    MRI of the lumbar spine was performed prior to and following the uneventful  administration of 11 cc of Magnevist venous leak. Sequences included: Localizer,  sagittal T1, sagittal T1 postcontrast with fat saturation, sagittal inversion  recovery, sagittal T2, axial T1, axial T1 postcontrast, and axial T2    FINDINGS:  There is mild apex right scoliosis of the lumbar spine, with mild apex left  scoliosis of the thoracolumbar junction. 4 mm of grade 1 anterior listhesis of  L4 on L5. Vertebral body heights are preserved. No fracture. Minimal multilevel  small osteophyte formation. Mild disc narrowing and desiccation throughout the lumbar spine. Conus terminates at the L1-2 level. Mild degenerative changes present in the sacroiliac joints. Patient has  undergone previous partial left nephrectomy. High T2 signal foci in the kidneys,  incompletely evaluated on this study. High T2 signal focus in the posterior  right hepatic lobe is also possibly a cyst, but incompletely evaluated. 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       Correlation of axial and sagittal images reveals the following:    At L1-L2: No significant disc pathology or proliferative changes. No central  canal or foraminal stenosis. At L2-L3: Mild bulging of the posterolateral disc corners, with mild facet  arthropathy and ligamentous buckling. Trace facet joint effusions. Canal is  patent.  Mild right greater than left foraminal narrowing. At L3-L4: Mild bulging of the posterolateral disc corners, with mild facet  arthropathy and ligamentous buckling. Trace right facet joint effusion. Canal is  patent. Mild bilateral foraminal narrowing. At L4-L5: Uncovering of the disc due to the listhesis, with diffuse disc bulge. Mild to moderate facet arthropathy. Canal is patent.  Susceptibility artifact in  the posterior soft tissues. Mild bilateral foraminal narrowing. Question prior  left hemilaminectomy. At L5-S1: Small posterior disc bulge, with mild facet arthropathy. Canal is  patent. No significant foraminal narrowing.              Impression IMPRESSION:    Thoracolumbar scoliosis. Grade 1 anterior listhesis of L4 on L5. Multilevel  degenerative changes, with overall patent canal. Multilevel mild foraminal  narrowing. Question prior left hemilaminectomy at L4. High T2 signal foci in the kidneys, incompletely evaluated on this study. High  T2 signal focus in the posterior right hepatic lobe is also possibly a cyst, but  incompletely evaluated.          Written by Adrian Deleon, as dictated by David Barrios MD.  I, Dr. David Barrios confirm that all documentation is accurate.

## 2018-07-08 PROBLEM — Z79.01 CHRONIC ANTICOAGULATION: Status: ACTIVE | Noted: 2018-07-08

## 2018-07-10 ENCOUNTER — APPOINTMENT (OUTPATIENT)
Dept: CARDIAC REHAB | Age: 78
End: 2018-07-10
Payer: MEDICARE

## 2018-07-12 ENCOUNTER — APPOINTMENT (OUTPATIENT)
Dept: CARDIAC REHAB | Age: 78
End: 2018-07-12
Payer: MEDICARE

## 2018-07-17 ENCOUNTER — APPOINTMENT (OUTPATIENT)
Dept: CARDIAC REHAB | Age: 78
End: 2018-07-17
Payer: MEDICARE

## 2018-07-31 ENCOUNTER — OFFICE VISIT (OUTPATIENT)
Dept: CARDIOLOGY CLINIC | Age: 78
End: 2018-07-31

## 2018-07-31 VITALS
BODY MASS INDEX: 21.53 KG/M2 | WEIGHT: 117 LBS | OXYGEN SATURATION: 98 % | SYSTOLIC BLOOD PRESSURE: 140 MMHG | HEART RATE: 85 BPM | DIASTOLIC BLOOD PRESSURE: 70 MMHG | HEIGHT: 62 IN

## 2018-07-31 DIAGNOSIS — I35.0 AORTIC VALVE STENOSIS, ETIOLOGY OF CARDIAC VALVE DISEASE UNSPECIFIED: Primary | Chronic | ICD-10-CM

## 2018-07-31 DIAGNOSIS — Z90.5 S/P NEPHRECTOMY: ICD-10-CM

## 2018-07-31 DIAGNOSIS — I10 HYPERTENSION, UNSPECIFIED TYPE: ICD-10-CM

## 2018-07-31 DIAGNOSIS — E78.5 DYSLIPIDEMIA: ICD-10-CM

## 2018-07-31 NOTE — PROGRESS NOTES
1. Have you been to the ER, urgent care clinic since your last visit? Hospitalized since your last visit? March 2018 [facility] [place] Aortic valve replacement     2. Have you seen or consulted any other health care providers outside of the [address] [place] [address] since your last visit? Include any pap smears or colon screening.  Yes pcp     Swelling off and on

## 2018-07-31 NOTE — PROGRESS NOTES
HPI:    I saw Lottie Sabillon in my office today in cardiovascular evaluation regarding her aortic stenosis and hypertensive heart disease now about 4 months after her aortic valve replacement via TAVR. Ms. Wero Muñoz is a pleasant 81 year old white female with history of chest tightness on exertion and some shortness of breath on exertion in the past, which ultimately prompted a cardiac catheterization in February of 2015, even though her nuclear myocardial perfusion study prior to that appeared to be normal. Her subsequent cardiac catheterization completed on 2/23/15 by my associate Dr. Rosa Meneses demonstrated only mild coronary artery disease with mild aortic stenosis with a peak systolic gradient of 21 mmHg across the valve and a valve area of 1.25 cm squared.       Over the years she has had some slowly increasing shortness of breath and her aortic stenosis has slowly worsened to the point that it became severe noted on her echo on February 8, 2018 which demonstrated normal left ventricular systolic function in the setting of moderate to severe left ventricular hypertrophy and a mean gradient of 42 mm across the valve with an aortic valve area by VTI of 0.85 cm².     I subsequently referred her to Dr. Dung Rust at the [facility] who ultimately did a cardiac catheterization on her showing patent coronaries, but severe aortic stenosis prompting a TAVR via the right femoral artery using a 26 mm Medtronic Evolut R stent valve pleaded on March 20, 2018. She has had a good recovery from that procedure and comes in today relating that she is feeling quite well and her functional capacity is markedly improved. Encounter Diagnoses   Name Primary?  Aortic valve stenosis, a year and resolved status post TAVR on 3/20/2018 Yes    Hypertension, unspecified type     Dyslipidemia     S/p right nephrectomy for cancer in 1/10        Discussion:  This lady appears to be doing reasonably well at this juncture and I have no recommendations for change at this time. Her blood pressure is little borderline elevated today I checked it again myself and got 140/70 as my staff had gotten. She is recently been put on Norvasc 5 mg daily for somewhat elevated blood pressure during rehab in addition to the Cozaar 100 mg which she was already taking by Dr. Ramirez Conrad and I will leave follow-up and further management of that problem to him along with Lasix which she has been taking twice a week and I suspect will be able to be discontinued soon. She does have history of hypercholesterolemia for which she has been on Lipitor 80 mg daily and I will check with Dr. Ramirez Conrad get a copy of her latest lipid profile for my records but historically she has done quite well on that dosage. Her blood pressure is very well-controlled today and clinically she seems to be doing extremely well on her current medical regimen, so I will simply plan to see her again in several months. PCP:  Moises Mayen MD       Past Medical History:   Diagnosis Date    Anemia NEC     Aneurysm ([address]     left kidney    Arthritis     Asthma     Cardiac catheterization 02/23/2015    R dominant. LM patent. mLAD 20%. CX patent. mRCA 20%. LVEDP 8.  EF 60%. Mild AS. RA 3/1. PA 24. W 6/5.  CI 2.94.    Cardiac echocardiogram 07/27/2016    EF 60-65%. No WMA. Mild LVH. Normal LV diastolic fx. Mild LAE. Mod-severe AS (mean grad 34 mmHg). AS has worsened since study of 1/20/15. Mild MR.  Cardiac nuclear imaging test 02/10/2015    No evidence of ischemia or prior infarction. Hyperdynamic LV fx.  EF >70%. No RWMA. Nondiagnostic EKG on pharm stress test.  Low risk.  Carotid duplex 01/12/2016    Mild < 50% bilateral ICA stenosis.       Chest tightness     exertional, in the setting of widely patent coronary arteries, possibly related to elevated left ventricular end-diastolic pressure or possibly to small-vessel disease, improved on Cardizem CD in place of Verapamil    Chronic kidney disease, stage III (moderate)     Essential hypertension     Glaucoma     Heart murmur     Hypercholesterolemia     Hypoglycemia     Raynaud's syndrome     Renal cell carcinoma (HCC)     Stage T1a Furhman Grade 2 s/p right open partial nephrectomy 2/22/10      S/p nephrectomy 2/22/10    right    Scoliosis     Stroke [facility]) 2/2015    resid los of balance         Past Surgical History:   Procedure Laterality Date    COLONOSCOPY N/A 10/10/2016    COL[facility]in [facility] ENDOSCOPY    HX AORTIC VALVE REPLACEMENT Bilateral 03/2012018    not allowed to have MRI    HX CYST REMOVAL      on spine    HX GYN      partial hysterectomy 1988, on Premarin until a few years ago    HX HEART CATHETERIZATION  3/18/09    Low left-sided and right-sided filling pressures (likely related to overnight fasting). Normal resting pulmonary  arterial pressure. Normal systemic pressures. Borderline left ventricular end-diastolic pressure at rest. Mild incerease in pulmonary artery pressure and pulmonary capillary wedge pressure with exercise. Mild, hemodynamically nonsignificant epicardial coronary artery disease.  HX NEPHRECTOMY  2/22/10    s/p partial right nephrectomy for right kidney lower pole mass secondary to early stage cancer    HX OTHER SURGICAL      Ovary removal    HX TRABECULECTOMY      VASCULAR SURGERY PROCEDURE UNLIST  03/20/2018    artificial Heart Valve (Dr. Leopoldo Madison with [facility])         Current Outpatient Rx   Name  Route  Sig  Dispense  Refill    losartan (COZAAR) 100 mg tablet    Oral    Take 100 mg by mouth daily.  aspirin delayed-release 81 mg tablet    Oral    Take  by mouth daily.  meloxicam (MOBIC) 15 mg tablet                      LINACLOTIDE (LINZESS PO)    Oral    Take  by mouth.  atorvastatin (LIPITOR) 80 mg tablet    Oral    Take 1 Tab by mouth daily.     90 Tab    3      Cholecalciferol, Vitamin D3, 50,000 unit cap    Oral    Take 1 Cap by mouth every seven (7) days.  alendronate (FOSAMAX) 35 mg tablet    Oral    Take 35 mg by mouth every seven (7) days.  nitroglycerin (NITROLINGUAL) 400 mcg/spray spray    SubLINGual    1 spray by SubLINGual route every five (5) minutes as needed. 1 Bottle    1      mometasone-formoterol (DULERA) 100-5 mcg/actuation HFA inhaler    Inhalation    Take 2 Puffs by inhalation two (2) times a day.  nitroglycerin (NITROBID) 2 % ointment    Topical    Apply 1 Inch to affected area as needed.  FLUNISOLIDE (AEROBID IN)    Inhalation    Take 1 Puff by inhalation daily.  LACTOBACILLUS ACIDOPHILUS (PROBIOTIC PO)    Oral    Take 1 Tab by mouth daily.  PROPYLENE GLYCOL/ (SYSTANE OP)    Ophthalmic    Apply  to eye as needed.  ipratropium (ATROVENT) 0.03 % nasal spray    Both Nostrils    2 Sprays by Both Nostrils route every twelve (12) hours.  fluticasone (FLONASE) 50 mcg/actuation nasal spray    Both Nostrils    2 Sprays by Both Nostrils route nightly.  albuterol (PROVENTIL HFA, VENTOLIN HFA) 90 mcg/actuation inhaler    Inhalation    Take 2 Puffs by inhalation as needed.  Azelastine (ASTEPRO) 0.15 % (205.5 mcg) nasal spray    Both Nostrils    1 Spray by Both Nostrils route two (2) times a day.  CYANOCOBALAMIN, VITAMIN B-12, (VITAMIN B-12 IJ)    Injection    1,000 mcg by Injection route every thirty (30) days.  cetirizine (ZYRTEC) 10 mg tablet    Oral    Take 10 mg by mouth as needed.                    Allergies   Allergen Reactions    Vicodin [Hydrocodone-Acetaminophen] Nausea and Vomiting       Social   Social History   Substance Use Topics    Smoking status: Never Smoker    Smokeless tobacco: Never Used    Alcohol use No         Family history: family history includes COPD in her mother; Cancer in her father; Heart Disease in her brother; Thyroid Disease in her mother. Review of Systems:  Constitutional: Negative. Respiratory: Positive for cough. Negative for hemoptysis, sputum production, shortness of breath and wheezing. Cardiovascular: Negative. Gastrointestinal: Negative. Musculoskeletal: Negative. Physical Exam:   The patient is an alert, oriented, well developed, well nourished 70 y.o.  female who was in no acute distress at the time of my examination. Visit Vitals    /70    Pulse 85    Ht 5' 2\" (1.575 m)    Wt 53.1 kg (117 lb)    SpO2 98%    BMI 21.4 kg/m2        BP Readings from Last 3 Encounters:   07/31/18 140/70   07/05/18 174/79   04/05/18 159/64        Wt Readings from Last 3 Encounters:   07/31/18 53.1 kg (117 lb)   07/05/18 53.2 kg (117 lb 3.2 oz)   07/03/18 51.7 kg (114 lb)       HEENT: Conjunctivae white, mucosa moist, no pallor or cyanosis. Neck: Supple without masses, tenderness or thyromegaly. No jugular venous distention. Carotid upstrokes are full bilaterally with bilateral bruits vs radiation of murmur to the neck. Cardiovascular: Chest is symmetrical with good excursion. Halsey is not displaced. No lifts, heaves or thrills. There is a normal S1 and S2 with a grade 1/VI soft JENNY along the LSB, with radiation to the base without appreciable diastolic murmur, rubs, clicks or gallops. Lungs: Clear to auscultation in all fields. Abdomen: Soft. No masses, tenderness or organomegaly. Extremities: No edema, with full peripheral pulses. Review of Data: Please refer to past medical history for most recent cardiac testing.   Lab Results   Component Value Date/Time    Cholesterol, total 160 02/21/2015 02:02 AM    HDL Cholesterol 73 (H) 02/21/2015 02:02 AM    LDL, calculated 76.6 02/21/2015 02:02 AM    Triglyceride 52 02/21/2015 02:02 AM    CHOL/HDL Ratio 2.2 02/21/2015 02:02 AM       Results for orders placed or performed in visit on 07/31/18   AMB POC EKG ROUTINE W/ 12 LEADS, INTER & REP     Status: None    Narrative    Normal sinus rhythm, rate 85. There is borderline left atrial abnormality and early R-wave transition anteriorly. This EKG is similar to past tracings. Violeta Harvey D.O., FLISBETC. [facility]  [facility] [place]  [address] [address] [address]    PLEASE NOTE:  This document has been produced using voice recognition software. Unrecognized errors in transcription may be present.

## 2018-07-31 NOTE — PROGRESS NOTES
Review of Systems Constitutional: Negative. Respiratory: Positive for cough. Negative for hemoptysis, sputum production, shortness of breath and wheezing. Cardiovascular: Negative. Gastrointestinal: Negative. Musculoskeletal: Negative.

## 2018-07-31 NOTE — MR AVS SNAPSHOT
[address] [address] [address] [id number] [phone] Patient: Tami Lemon MRN: [id number] TYU:[id number] Visit Information Date & Time Provider Department Dept. Phone Encounter #  
 7/31/2018 11:00 AM Wendy Rabago DO [facility] [id number] Your Appointments 10/4/2018  8:15 AM  
Follow Up with Ariane Mulligan MD  
[facility] ([facility]) Appt Note: 3 mo follow up for lower back/med refill [address]  
  
    
 10/17/2018  9:30 AM  
ULTRASOUND with [facility] ULTRASOUND [facility] [place] [address] ([facility]) Appt Note: Return in about 1 year (around 10/20/2018) for FU with prior renal US with Dr Barr  
 [address] [facility] [address] [id number]  
  
    
 10/17/2018 10:45 AM  
ESTABLISHED PATIENT with Luciana Hernandez MD  
[facility] ([facility]) Appt Note: Return in about 1 year (around 10/20/2018) for FU with prior renal US with Dr Barr  
 [address] [facility], [address] [facility] [address]  
  
   
 [address] [facility], [address] [place] [address] Upcoming Health Maintenance Date Due DTaP/Tdap/Td series (1 - Tdap) 2/21/1963 ZOSTER VACCINE AGE 60> 12/21/2001 GLAUCOMA SCREENING Q2Y 2/21/2007 Pneumococcal 65+ Low/Medium Risk (1 of 2 - PCV13) 2/21/2007 MEDICARE YEARLY EXAM 3/14/2018 Influenza Age 5 to Adult 8/1/2018 Allergies as of 7/31/2018  Review Complete On: 7/31/2018 By: Michael Arambula LPN Severity Noted Reaction Type Reactions Vicodin [Hydrocodone-acetaminophen]  09/15/2015    Nausea and Vomiting Current Immunizations  Reviewed on 2/22/2015 No immunizations on file. Not reviewed this visit You Were Diagnosed With   
  
 Codes Comments Aortic valve stenosis, etiology of cardiac valve disease unspecified    -  Primary ICD-10-CM: I35.0 ICD-9-CM: 424.1 Other chest pain     ICD-10-CM: R07.89 ICD-9-CM: 786.59 Vitals BP Pulse Height(growth percentile) Weight(growth percentile) SpO2 BMI  
 140/70 85 5' 2\" (1.575 m) 117 lb (53.1 kg) 98% 21.4 kg/m2 OB Status Smoking Status Hysterectomy Never Smoker Vitals History BMI and BSA Data Body Mass Index Body Surface Area  
 21.4 kg/m 2 1.52 m 2 Preferred Pharmacy Pharmacy Name Phone [phone] [address], [address] [phone] Your Updated Medication List  
  
   
This list is accurate as of 7/31/18 12:30 PM.  Always use your most recent med list.  
  
  
  
  
 acetaminophen-codeine 300-30 mg per tablet Commonly known as:  TYLENOL #3  
1 po bid as needed for severe pain  Indications: Pain AEROBID IN Take 1 Puff by inhalation daily. albuterol 90 mcg/actuation inhaler Commonly known as:  PROVENTIL HFA, VENTOLIN HFA, PROAIR HFA Take 2 Puffs by inhalation as needed. amLODIPine 5 mg tablet Commonly known as:  Savi Colon Take 5 mg by mouth daily. aspirin delayed-release 81 mg tablet Take  by mouth daily. ASTEPRO 0.15 % (205.5 mcg) nasal spray Generic drug:  Azelastine 1 Spray by Both Nostrils route two (2) times a day. atorvastatin 80 mg tablet Commonly known as:  LIPITOR Take 1 Tab by mouth daily. cholecalciferol 50,000 unit capsule Commonly known as:  VITAMIN D3 Take 1 Cap by mouth every seven (7) days. DULERA 100-5 mcg/actuation HFA inhaler Generic drug:  mometasone-formoterol Take 2 Puffs by inhalation two (2) times a day. ergocalciferol 50,000 unit capsule Commonly known as:  ERGOCALCIFEROL Take 50,000 Units by mouth every seven (7) days. ferrous sulfate 325 mg (65 mg iron) Cper Take  by mouth. FLONASE 50 mcg/actuation nasal spray Generic drug:  fluticasone 2 Sprays by Both Nostrils route nightly. fluticasone-salmeterol 113-14 mcg/actuation Aepb INL 1 PUFF PO TWICE DAILY. RM AFTER U  
  
 ipratropium 0.03 % nasal spray Commonly known as:  ATROVENT  
2 Sprays by Both Nostrils route every twelve (12) hours. LASIX 20 mg tablet Generic drug:  furosemide Take  by mouth daily. LINZESS 145 mcg Cap capsule Generic drug:  linaclotide Take 145 mcg by mouth daily. losartan 100 mg tablet Commonly known as:  COZAAR Take 100 mg by mouth daily. meloxicam 15 mg tablet Commonly known as:  MOBIC Take 15 mg by mouth daily. montelukast 10 mg tablet Commonly known as:  SINGULAIR TK 1 T PO QD IN THE NANCY * nitroglycerin 2 % ointment Commonly known as:  NITROBID Apply 1 Inch to affected area as needed. * nitroglycerin 400 mcg/spray spray Commonly known as:  NITROLINGUAL  
1 spray by SubLINGual route every five (5) minutes as needed. PLAVIX 75 mg Tab Generic drug:  clopidogrel Take  by mouth. SYSTANE OP Apply  to eye as needed. VITAMIN B-12 INJECTION  
1,000 mcg by Injection route every thirty (30) days. ZyrTEC 10 mg tablet Generic drug:  cetirizine Take 10 mg by mouth as needed. * Notice: This list has 2 medication(s) that are the same as other medications prescribed for you. Read the directions carefully, and ask your doctor or other care provider to review them with you. We Performed the Following AMB POC EKG ROUTINE W/ 12 LEADS, INTER & REP [81068 CPT(R)] Introducing [facility]! [facility] introduces [employer] patient portal. Now you can access parts of your medical record, email your doctor's office, and request medication refills online. 1. In your internet browser, go to [email or web address] 2. Click on the First Time User? Click Here link in the Sign In box. You will see the New Member Sign Up page. 3. Enter your [employer] Access Code exactly as it appears below. You will not need to use this code after youve completed the sign-up process. If you do not sign up before the expiration date, you must request a new code. · [employer] Access Code: [email or web address] Expires: 10/3/2018  8:19 AM 
 
4. Enter the last four digits of your Social Security Number (xxxx) and Date of Birth (mm/dd/yyyy) as indicated and click Submit. You will be taken to the next sign-up page. 5. Create a [employer] ID. This will be your Work[employer] login ID and cannot be changed, so think of one that is secure and easy to remember. 6. Create a Work[employer] password. You can change your password at any time. 7. Enter your Password Reset Question and Answer. This can be used at a later time if you forget your password. 8. Enter your e-mail address. You will receive e-mail notification when new information is available in [address] 9. Click Sign Up. You can now view and download portions of your medical record. 10. Click the Download Summary menu link to download a portable copy of your medical information. If you have questions, please visit the Frequently Asked Questions section of the [employer] website. Remember, [employer] is NOT to be used for urgent needs. For medical emergencies, dial 911. Now available from your iPhone and Android! Please provide this summary of care documentation to your next provider. Your primary care clinician is listed as NUVIA RODRIGUEZ. If you have any questions after today's visit, please call [phone].

## 2018-08-06 ENCOUNTER — TELEPHONE (OUTPATIENT)
Dept: ORTHOPEDIC SURGERY | Age: 78
End: 2018-08-06

## 2018-08-06 DIAGNOSIS — Z79.891 USE OF OPIATES FOR THERAPEUTIC PURPOSES: ICD-10-CM

## 2018-08-06 DIAGNOSIS — G89.29 OTHER CHRONIC PAIN: ICD-10-CM

## 2018-08-06 RX ORDER — ACETAMINOPHEN AND CODEINE PHOSPHATE 300; 30 MG/1; MG/1
TABLET ORAL
Qty: 60 TAB | Refills: 0 | Status: SHIPPED | OUTPATIENT
Start: 2018-08-06 | End: 2018-10-04 | Stop reason: SDUPTHER

## 2018-08-06 NOTE — TELEPHONE ENCOUNTER
Patient called for Dr. Walker Villagran. Patient said that [address] had given her a script for the Tylenol #3 medication, but that she has looked all over her home and can not find it. That she called the pharmacy thinking she had taken it in , but they told her they did not have it. Patient is requesting  prescribe her the medication again and if possible call it into her pharmacy . [address] [phone]. Patient said that at her age she does not remember things to well, and her  is ill and they are selling just about everything in the home. Patient tel. [phone].

## 2018-08-07 NOTE — TELEPHONE ENCOUNTER
Per Dr. Christal Starr, ok to phone in Rx for Tylenol #3. Called patient verified , informed patient of above. Patient verified [facility] [employer]. Patient informed if Rx was found to return to office. Patient verbalized agreement/understanding. Med phoned into pharmacy verified by patient, spoke with Pharmacist Cricket, she Eid. No further action required at this time.

## 2018-10-04 ENCOUNTER — OFFICE VISIT (OUTPATIENT)
Dept: ORTHOPEDIC SURGERY | Age: 78
End: 2018-10-04

## 2018-10-04 VITALS
HEIGHT: 62 IN | DIASTOLIC BLOOD PRESSURE: 61 MMHG | RESPIRATION RATE: 14 BRPM | SYSTOLIC BLOOD PRESSURE: 145 MMHG | HEART RATE: 84 BPM | BODY MASS INDEX: 21.27 KG/M2 | OXYGEN SATURATION: 98 % | WEIGHT: 115.6 LBS

## 2018-10-04 DIAGNOSIS — Z90.5 S/P NEPHRECTOMY: ICD-10-CM

## 2018-10-04 DIAGNOSIS — N18.30 CHRONIC KIDNEY DISEASE, STAGE III (MODERATE) (HCC): ICD-10-CM

## 2018-10-04 DIAGNOSIS — G89.29 OTHER CHRONIC PAIN: Primary | ICD-10-CM

## 2018-10-04 DIAGNOSIS — Z79.891 USE OF OPIATES FOR THERAPEUTIC PURPOSES: ICD-10-CM

## 2018-10-04 DIAGNOSIS — M47.816 LUMBAR FACET ARTHROPATHY: ICD-10-CM

## 2018-10-04 RX ORDER — ACETAMINOPHEN AND CODEINE PHOSPHATE 300; 30 MG/1; MG/1
TABLET ORAL
Qty: 60 TAB | Refills: 2 | Status: SHIPPED | OUTPATIENT
Start: 2018-10-04 | End: 2019-01-03

## 2018-10-04 NOTE — PATIENT INSTRUCTIONS
Preventing Falls: Care Instructions Your Care Instructions Getting around your home safely can be a challenge if you have injuries or health problems that make it easy for you to fall. Loose rugs and furniture in walkways are among the dangers for many older people who have problems walking or who have poor eyesight. People who have conditions such as arthritis, osteoporosis, or dementia also have to be careful not to fall. You can make your home safer with a few simple measures. Follow-up care is a key part of your treatment and safety. Be sure to make and go to all appointments, and call your doctor if you are having problems. It's also a good idea to know your test results and keep a list of the medicines you take. How can you care for yourself at home? Taking care of yourself · You may get dizzy if you do not drink enough water. To prevent dehydration, drink plenty of fluids, enough so that your urine is light yellow or clear like water. Choose water and other caffeine-free clear liquids. If you have kidney, heart, or liver disease and have to limit fluids, talk with your doctor before you increase the amount of fluids you drink. · Exercise regularly to improve your strength, muscle tone, and balance. Walk if you can. Swimming may be a good choice if you cannot walk easily. · Have your vision and hearing checked each year or any time you notice a change. If you have trouble seeing and hearing, you might not be able to avoid objects and could lose your balance. · Know the side effects of the medicines you take. Ask your doctor or pharmacist whether the medicines you take can affect your balance. Sleeping pills or sedatives can affect your balance. · Limit the amount of alcohol you drink. Alcohol can impair your balance and other senses. · Ask your doctor whether calluses or corns on your feet need to be removed.  If you wear loose-fitting shoes because of calluses or corns, you can lose your balance and fall. · Talk to your doctor if you have numbness in your feet. Preventing falls at home · Remove raised doorway thresholds, throw rugs, and clutter. Repair loose carpet or raised areas in the floor. · Move furniture and electrical cords to keep them out of walking paths. · Use nonskid floor wax, and wipe up spills right away, especially on ceramic tile floors. · If you use a walker or cane, put rubber tips on it. If you use crutches, clean the bottoms of them regularly with an abrasive pad, such as steel wool. · Keep your house well lit, especially Yusra Small, and outside walkways. Use night-lights in areas such as hallways and bathrooms. Add extra light switches or use remote switches (such as switches that go on or off when you clap your hands) to make it easier to turn lights on if you have to get up during the night. · Install sturdy handrails on stairways. · Move items in your cabinets so that the things you use a lot are on the lower shelves (about waist level). · Keep a cordless phone and a flashlight with new batteries by your bed. If possible, put a phone in each of the main rooms of your house, or carry a cell phone in case you fall and cannot reach a phone. Or, you can wear a device around your neck or wrist. You push a button that sends a signal for help. · Wear low-heeled shoes that fit well and give your feet good support. Use footwear with nonskid soles. Check the heels and soles of your shoes for wear. Repair or replace worn heels or soles. · Do not wear socks without shoes on wood floors. · Walk on the grass when the sidewalks are slippery. If you live in an area that gets snow and ice in the winter, sprinkle salt on slippery steps and sidewalks. Preventing falls in the bath · Install grab bars and nonskid mats inside and outside your shower or tub and near the toilet and sinks. · Use shower chairs and bath benches. · Use a hand-held shower head that will allow you to sit while showering. · Get into a tub or shower by putting the weaker leg in first. Get out of a tub or shower with your strong side first. 
· Repair loose toilet seats and consider installing a raised toilet seat to make getting on and off the toilet easier. · Keep your bathroom door unlocked while you are in the shower. Where can you learn more? Go to http://meena-juan r.info/. Enter [id number] [id number] in the search box to learn more about \"Preventing Falls: Care Instructions. \" Current as of: March 16, 2018 Content Version: 11.8 © 2128-3305 [employer]. Care instructions adapted under license by [employer] (which disclaims liability or warranty for this information). If you have questions about a medical condition or this instruction, always ask your healthcare professional. [place] [address] any warranty or liability for your use of this information. Low Back Arthritis: Exercises Your Care Instructions Here are some examples of typical rehabilitation exercises for your condition. Start each exercise slowly. Ease off the exercise if you start to have pain. Your doctor or physical therapist will tell you when you can start these exercises and which ones will work best for you. When you are not being active, find a comfortable position for rest. Some people are comfortable on the floor or a medium-firm bed with a small pillow under their head and another under their knees. Some people prefer to lie on their side with a pillow between their knees. Don't stay in one position for too long. Take short walks (10 to 20 minutes) every 2 to 3 hours. Avoid slopes, hills, and stairs until you feel better. Walk only distances you can manage without pain, especially leg pain. How to do the exercises Pelvic tilt 1. Lie on your back with your knees bent. 2. \"Brace\" your stomachtighten your muscles by pulling in and imagining your belly button moving toward your spine. 3. Press your lower back into the floor. You should feel your hips and pelvis rock back. 4. Hold for 6 seconds while breathing smoothly. 5. Relax and allow your pelvis and hips to rock forward. 6. Repeat 8 to 12 times. Back stretches 1. Get down on your hands and knees on the floor. 2. Relax your head and allow it to droop. Round your back up toward the ceiling until you feel a nice stretch in your upper, middle, and lower back. Hold this stretch for as long as it feels comfortable, or about 15 to 30 seconds. 3. Return to the starting position with a flat back while you are on your hands and knees. 4. Let your back sway by pressing your stomach toward the floor. Lift your buttocks toward the ceiling. 5. Hold this position for 15 to 30 seconds. 6. Repeat 2 to 4 times. Follow-up care is a key part of your treatment and safety. Be sure to make and go to all appointments, and call your doctor if you are having problems. It's also a good idea to know your test results and keep a list of the medicines you take. Where can you learn more? Go to http://meena-juan r.info/. Enter R927 in the search box to learn more about \"Low Back Arthritis: Exercises. \" Current as of: November 29, 2017 Content Version: 11.8 © 4254-5356 [employer]. Care instructions adapted under license by [employer] (which disclaims liability or warranty for this information). If you have questions about a medical condition or this instruction, always ask your healthcare professional. [place] [address] any warranty or liability for your use of this information.

## 2018-10-04 NOTE — PROGRESS NOTES
[facility] [address] 
[address] [place], [address] Phone: [phone] Fax: [phone] Pt's YOB: 1940 ASSESSMENT Diagnoses and all orders for this visit: 
 
1. Other chronic pain 
-     acetaminophen-codeine (TYLENOL #3) 300-30 mg per tablet; TAKE 1 TABLET BY MOUTH TWICE DAILY AS NEEDED FOR SEVERE PAIN. Indications: Pain 2. Use of opiates for therapeutic purposes 
-     acetaminophen-codeine (TYLENOL #3) 300-30 mg per tablet; TAKE 1 TABLET BY MOUTH TWICE DAILY AS NEEDED FOR SEVERE PAIN. Indications: Pain 3. Lumbar facet arthropathy 4. Chronic kidney disease, stage III (moderate) (HCC) 5. S/p right nephrectomy for cancer in 1/10 IMPRESSION AND PLAN: 
Kait Peters is a 70 y.o.  female with history of chronic lumbar pain. She complains of pain across the lower back and admits that she has been more active. Pt takes Tylenol #3 as her pain warrants, generally 1-2 x daily with benefit. 1) Pt was given information on lumbar arthritis exercises. 2) She received a refill of Tylenol #3 1 tab BID prn severe pain. 3) Pt may intermittently use a lumbar support. 4) She is not a candidate for renal disease and is not a candidate for NSAID's. 
5) Ms. Dnayell Mota has a reminder for a \"due or due soon\" health maintenance. I have asked that she contact her primary care provider, Tiago Frost MD, for follow-up on this health maintenance. 6)  demonstrated consistency with prescribing. 7) Last UDS from 07/05/2018 was consistent. 8) Pt will follow-up in 3 months or sooner if needed. HISTORY OF PRESENT ILLNESS: 
Kait Peters is a 70 y.o.  female with history of chronic lumbar pain. She complains of pain across the lower back. She notes that she has been more active recently since she is getting ready for a yard sale. Pt admits to increased pain during the colder and more humid months.  She has not tried wearing a lumbar support. Pt has been prescribed Tylenol #3 and takes it as her pain warrants, generally 1-2 x daily. Of note, patient's MME/day is 4.5 to 9.0 and she keeps her medications locked up. Pt admits to constipation, unrelated to her medication, and takes Linzess 1-2x a week with benefit. She is no longer taking Plavix but is on aspirin. Pt follows up with her cardiologist regularly. Pt at this time desires to continue with current care. Pain Scale: 5/10 PCP: Edmond Owens MD 
  
Past Medical History:  
Diagnosis Date  Anemia NEC  Aneurysm ([facility] [address]   
 left kidney  Arthritis  Asthma  Cardiac catheterization 02/23/2015 R dominant. LM patent. mLAD 20%. CX patent. mRCA 20%. LVEDP 8.  EF 60%. Mild AS. RA 3/1. PA 24. W 6/5.  CI 2.94.  
 Cardiac echocardiogram 07/27/2016 EF 60-65%. No WMA. Mild LVH. Normal LV diastolic fx. Mild LAE. Mod-severe AS (mean grad 34 mmHg). AS has worsened since study of 1/20/15. Mild MR.  Cardiac nuclear imaging test 02/10/2015 No evidence of ischemia or prior infarction. Hyperdynamic LV fx.  EF >70%. No RWMA. Nondiagnostic EKG on pharm stress test.  Low risk.  Carotid duplex 01/12/2016 Mild < 50% bilateral ICA stenosis.  Chest tightness   
 exertional, in the setting of widely patent coronary arteries, possibly related to elevated left ventricular end-diastolic pressure or possibly to small-vessel disease, improved on Cardizem CD in place of Verapamil  Chronic kidney disease, stage III (moderate) (HCC)  Essential hypertension  Glaucoma  Heart murmur  Hypercholesterolemia  Hypoglycemia  Raynaud's syndrome  Renal cell carcinoma ([facility] [address] Stage T1a Furhman Grade 2 s/p right open partial nephrectomy 2/22/10    
 S/p nephrectomy 2/22/10  
 right  Scoliosis  Stroke ([facility] [address] 2/2015  
 resid los of balance Social History Social History  Marital status:   
 Spouse name: N/A  
 Number of children: N/A  
 Years of education: N/A Occupational History  Not on file. Social History Main Topics  Smoking status: Never Smoker  Smokeless tobacco: Never Used  Alcohol use No  
 Drug use: No  
 Sexual activity: Yes  
  Partners: Male Other Topics Concern  Not on file Social History Narrative Current Outpatient Prescriptions Medication Sig Dispense Refill  acetaminophen-codeine (TYLENOL #3) 300-30 mg per tablet TAKE 1 TABLET BY MOUTH TWICE DAILY AS NEEDED FOR SEVERE PAIN. Indications: Pain 60 Tab 2  
 amLODIPine (NORVASC) 5 mg tablet Take 5 mg by mouth daily.  furosemide (LASIX) 20 mg tablet Take  by mouth daily.  clopidogrel (PLAVIX) 75 mg tab Take  by mouth.  ferrous sulfate 325 mg (65 mg iron) cpER Take  by mouth.  ergocalciferol (ERGOCALCIFEROL) 50,000 unit capsule Take 50,000 Units by mouth every seven (7) days.  fluticasone-salmeterol 113-14 mcg/actuation aepb INL 1 PUFF PO TWICE DAILY. RM AFTER U  5  
 LINZESS 145 mcg cap capsule Take 145 mcg by mouth daily.  montelukast (SINGULAIR) 10 mg tablet TK 1 T PO QD IN THE NANCY  1  
 losartan (COZAAR) 100 mg tablet Take 100 mg by mouth daily.  aspirin delayed-release 81 mg tablet Take  by mouth daily.  meloxicam (MOBIC) 15 mg tablet Take 15 mg by mouth daily.  atorvastatin (LIPITOR) 80 mg tablet Take 1 Tab by mouth daily. 90 Tab 3  Cholecalciferol, Vitamin D3, 50,000 unit cap Take 1 Cap by mouth every seven (7) days.  nitroglycerin (NITROLINGUAL) 400 mcg/spray spray 1 spray by SubLINGual route every five (5) minutes as needed. 1 Bottle 1  
 mometasone-formoterol (DULERA) 100-5 mcg/actuation HFA inhaler Take 2 Puffs by inhalation two (2) times a day.  nitroglycerin (NITROBID) 2 % ointment Apply 1 Inch to affected area as needed.  FLUNISOLIDE (AEROBID IN) Take 1 Puff by inhalation daily.  PROPYLENE GLYCOL/ (SYSTANE OP) Apply  to eye as needed.  ipratropium (ATROVENT) 0.03 % nasal spray 2 Sprays by Both Nostrils route every twelve (12) hours.  fluticasone (FLONASE) 50 mcg/actuation nasal spray 2 Sprays by Both Nostrils route nightly.  albuterol (PROVENTIL HFA, VENTOLIN HFA) 90 mcg/actuation inhaler Take 2 Puffs by inhalation as needed.  Azelastine (ASTEPRO) 0.15 % (205.5 mcg) nasal spray 1 Canmer by Both Nostrils route two (2) times a day.  CYANOCOBALAMIN, VITAMIN B-12, (VITAMIN B-12 IJ) 1,000 mcg by Injection route every thirty (30) days.  cetirizine (ZYRTEC) 10 mg tablet Take 10 mg by mouth as needed. Allergies Allergen Reactions  Vicodin [Hydrocodone-Acetaminophen] Nausea and Vomiting REVIEW OF SYSTEMS Constitutional: Negative for fever, chills, or weight change. Respiratory: Negative for cough or shortness of breath. Cardiovascular: Negative for chest pain or palpitations. Gastrointestinal: Negative for acid reflux, change in bowel habits, or constipation. Genitourinary: Negative for dysuria and flank pain. Musculoskeletal: Positive for lumbar pain. Skin: Negative for rash. Neurological: Negative for headaches, dizziness, or numbness. Endo/Heme/Allergies: Negative for increased bruising. Psychiatric/Behavioral: Negative for difficulty with sleep. PHYSICAL EXAMINATION Visit Vitals  /61  Pulse 84  Resp 14  
 Ht 5' 2\" (1.575 m)  Wt 115 lb 9.6 oz (52.4 kg)  SpO2 98%  BMI 21.14 kg/m2 Constitutional: Awake, alert, and in no acute distress. Neurological: 1+ symmetrical DTRs in the lower extremities. Sensation to light touch is intact. Skin: warm, dry, and intact. Musculoskeletal: Tenderness to palpation in the lower lumbar region. Moderate pain with extension and axial loading. No pain with internal or external rotation of her hips. Negative straight leg raise bilaterally. Hip Flex  Quads Hamstrings Ankle DF EHL Ankle PF Right +4/5 +4/5 +4/5 +4/5 +4/5 +4/5 Left +4/5 +4/5 +4/5 +4/5 +4/5 +4/5 IMAGING: 
 
Lumbar spine MRI from 03/16/2016 was personally reviewed with the patient and demonstrated: 
Results from Hospital Encounter on 03/16/16 MRI LUMB SPINE W WO CONT 
   Narrative MRI lumbar spine with and without contrast 
 
COMPARISON: CT chest abdomen and pelvis October 6, 2011 CLINICAL INFORMATION: Progressive back pain, left leg pain. PROCEDURE: 
 
MRI of the lumbar spine was performed prior to and following the uneventful 
administration of 11 cc of Magnevist venous leak. Sequences included: Localizer, 
sagittal T1, sagittal T1 postcontrast with fat saturation, sagittal inversion 
recovery, sagittal T2, axial T1, axial T1 postcontrast, and axial T2 
 
FINDINGS: 
There is mild apex right scoliosis of the lumbar spine, with mild apex left 
scoliosis of the thoracolumbar junction. 4 mm of grade 1 anterior listhesis of 
L4 on L5. Vertebral body heights are preserved. No fracture. Minimal multilevel 
small osteophyte formation. Mild disc narrowing and desiccation throughout the lumbar spine. Conus terminates at the L1-2 level. Mild degenerative changes present in the sacroiliac joints. Patient has 
undergone previous partial left nephrectomy. High T2 signal foci in the kidneys, 
incompletely evaluated on this study. High T2 signal focus in the posterior 
right hepatic lobe is also possibly a cyst, but incompletely evaluated. Tarlov 
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 
    
Correlation of axial and sagittal images reveals the following: 
 
At L1-L2: No significant disc pathology or proliferative changes. No central 
canal or foraminal stenosis. At L2-L3: Mild bulging of the posterolateral disc corners, with mild facet 
arthropathy and ligamentous buckling. Trace facet joint effusions. Canal is 
patent. Mild right greater than left foraminal narrowing. At L3-L4: Mild bulging of the posterolateral disc corners, with mild facet 
arthropathy and ligamentous buckling. Trace right facet joint effusion. Canal is 
patent. Mild bilateral foraminal narrowing. At L4-L5: Uncovering of the disc due to the listhesis, with diffuse disc bulge. Mild to moderate facet arthropathy. Canal is patent.  Susceptibility artifact in 
the posterior soft tissues. Mild bilateral foraminal narrowing. Question prior 
left hemilaminectomy. At L5-S1: Small posterior disc bulge, with mild facet arthropathy. Canal is 
patent. No significant foraminal narrowing.      
   
   Impression IMPRESSION: 
 
Thoracolumbar scoliosis. Grade 1 anterior listhesis of L4 on L5. Multilevel 
degenerative changes, with overall patent canal. Multilevel mild foraminal 
narrowing. Question prior left hemilaminectomy at L4. High T2 signal foci in the kidneys, incompletely evaluated on this study. High T2 signal focus in the posterior right hepatic lobe is also possibly a cyst, but 
incompletely evaluated.   
   
  
Written by Isabelle Michela, as dictated by Siria Hilario MD. 
I, Dr. Siria Hilario confirm that all documentation is accurate.

## 2018-10-04 NOTE — MR AVS SNAPSHOT
[address] [facility] 
 
 
 [address] 200 [facility] 
[phone] Patient: Kirill Burroughs MRN: [id number] Visit Information Date & Time Provider Department Dept. Phone Encounter #  
 10/4/2018  8:15 AM Christa Day  [facility], [address] [facility] [place] [phone] [id number] Follow-up Instructions Return in about 3 months (around 1/4/2019) for Medication follow up. Your Appointments 10/17/2018  9:30 AM  
ULTRASOUND with [facility] ULTRASOUND [facility] [address] ([facility]) Appt Note: Return in about 1 year (around 10/20/2018) for FU with prior renal US with Dr Barr  
 301 [facility] 2201 [facility] 2 [facility] [phone]  
  
    
 10/17/2018 10:45 AM  
ESTABLISHED PATIENT with Norberto Alvarez MD  
[facility] ([facility]) Appt Note: Return in about 1 year (around 10/20/2018) for FU with prior renal US with Dr Barr  
 301 [facility], [address]  
  
   
 301 [facility], [address] [facility]  
  
    
 2/11/2019  8:00 AM  
Follow Up with Stefany Moe DO [facility] ([facility]) Appt Note: 6 mo  f/u  
 [phone] [address] [address]  
[phone] 2300 [facility] [address] Upcoming Health Maintenance Date Due DTaP/Tdap/Td series (1 - Tdap) 2/21/1963 Shingrix Vaccine Age 50> (1 of 2) 2/21/1992 GLAUCOMA SCREENING Q2Y 2/21/2007 Pneumococcal 65+ Low/Medium Risk (1 of 2 - PCV13) 2/21/2007 MEDICARE YEARLY EXAM 3/14/2018 Influenza Age 5 to Adult 8/1/2018 Allergies as of 10/4/2018  Review Complete On: 10/4/2018 By: Emperatriz Finney LPN Severity Noted Reaction Type Reactions Vicodin [Hydrocodone-acetaminophen]  09/15/2015    Nausea and Vomiting Current Immunizations  Reviewed on 2/22/2015 No immunizations on file. Not reviewed this visit You Were Diagnosed With   
  
 Codes Comments Other chronic pain    -  Primary ICD-10-CM: G89.29 ICD-9-CM: 338.29 Use of opiates for therapeutic purposes     ICD-10-CM: Z79.891 ICD-9-CM: V58.69 Lumbar facet arthropathy     ICD-10-CM: M47.816 ICD-9-CM: 126. 3 Chronic kidney disease, stage III (moderate) (HCC)     ICD-10-CM: N18.3 ICD-9-CM: 585.3 S/p nephrectomy     ICD-10-CM: Z90.5 ICD-9-CM: V45.73 Vitals BP Pulse Resp Height(growth percentile) Weight(growth percentile) SpO2  
 145/61 84 14 5' 2\" (1.575 m) 115 lb 9.6 oz (52.4 kg) 98% BMI OB Status Smoking Status 21.14 kg/m2 Hysterectomy Never Smoker BMI and BSA Data Body Mass Index Body Surface Area  
 21.14 kg/m 2 1.51 m 2 Preferred Pharmacy Pharmacy Name Phone [phone] - [address], [address] AT [address] [phone] Your Updated Medication List  
  
   
This list is accurate as of 10/4/18  8:47 AM.  Always use your most recent med list.  
  
  
  
  
 acetaminophen-codeine 300-30 mg per tablet Commonly known as:  TYLENOL #3  
TAKE 1 TABLET BY MOUTH TWICE DAILY AS NEEDED FOR SEVERE PAIN. Indications: Pain AEROBID IN Take 1 Puff by inhalation daily. albuterol 90 mcg/actuation inhaler Commonly known as:  PROVENTIL HFA, VENTOLIN HFA, PROAIR HFA Take 2 Puffs by inhalation as needed. amLODIPine 5 mg tablet Commonly known as:  Achilles Bouillon Take 5 mg by mouth daily. aspirin delayed-release 81 mg tablet Take  by mouth daily. ASTEPRO 0.15 % (205.5 mcg) nasal spray Generic drug:  Azelastine 1 Spray by Both Nostrils route two (2) times a day. atorvastatin 80 mg tablet Commonly known as:  LIPITOR Take 1 Tab by mouth daily. cholecalciferol 50,000 unit capsule Commonly known as:  VITAMIN D3 Take 1 Cap by mouth every seven (7) days. DULERA 100-5 mcg/actuation HFA inhaler Generic drug:  mometasone-formoterol Take 2 Puffs by inhalation two (2) times a day. ergocalciferol 50,000 unit capsule Commonly known as:  ERGOCALCIFEROL Take 50,000 Units by mouth every seven (7) days. ferrous sulfate 325 mg (65 mg iron) Cper Take  by mouth. FLONASE 50 mcg/actuation nasal spray Generic drug:  fluticasone 2 Sprays by Both Nostrils route nightly. fluticasone-salmeterol 113-14 mcg/actuation Aepb INL 1 PUFF PO TWICE DAILY. RM AFTER U  
  
 ipratropium 0.03 % nasal spray Commonly known as:  ATROVENT  
2 Sprays by Both Nostrils route every twelve (12) hours. LASIX 20 mg tablet Generic drug:  furosemide Take  by mouth daily. LINZESS 145 mcg Cap capsule Generic drug:  linaclotide Take 145 mcg by mouth daily. losartan 100 mg tablet Commonly known as:  COZAAR Take 100 mg by mouth daily. meloxicam 15 mg tablet Commonly known as:  MOBIC Take 15 mg by mouth daily. montelukast 10 mg tablet Commonly known as:  SINGULAIR TK 1 T PO QD IN THE NANCY * nitroglycerin 2 % ointment Commonly known as:  NITROBID Apply 1 Inch to affected area as needed. * nitroglycerin 400 mcg/spray spray Commonly known as:  NITROLINGUAL  
1 spray by SubLINGual route every five (5) minutes as needed. PLAVIX 75 mg Tab Generic drug:  clopidogrel Take  by mouth. SYSTANE OP Apply  to eye as needed. VITAMIN B-12 INJECTION  
1,000 mcg by Injection route every thirty (30) days. ZyrTEC 10 mg tablet Generic drug:  cetirizine Take 10 mg by mouth as needed. * Notice: This list has 2 medication(s) that are the same as other medications prescribed for you.  Read the directions carefully, and ask your doctor or other care provider to review them with you. Prescriptions Printed Refills  
 acetaminophen-codeine (TYLENOL #3) 300-30 mg per tablet 2 Sig: TAKE 1 TABLET BY MOUTH TWICE DAILY AS NEEDED FOR SEVERE PAIN. Indications: Pain Class: Print Follow-up Instructions Return in about 3 months (around 1/4/2019) for Medication follow up. Patient Instructions Preventing Falls: Care Instructions Your Care Instructions Getting around your home safely can be a challenge if you have injuries or health problems that make it easy for you to fall. Loose rugs and furniture in walkways are among the dangers for many older people who have problems walking or who have poor eyesight. People who have conditions such as arthritis, osteoporosis, or dementia also have to be careful not to fall. You can make your home safer with a few simple measures. Follow-up care is a key part of your treatment and safety. Be sure to make and go to all appointments, and call your doctor if you are having problems. It's also a good idea to know your test results and keep a list of the medicines you take. How can you care for yourself at home? Taking care of yourself · You may get dizzy if you do not drink enough water. To prevent dehydration, drink plenty of fluids, enough so that your urine is light yellow or clear like water. Choose water and other caffeine-free clear liquids. If you have kidney, heart, or liver disease and have to limit fluids, talk with your doctor before you increase the amount of fluids you drink. · Exercise regularly to improve your strength, muscle tone, and balance. Walk if you can. Swimming may be a good choice if you cannot walk easily. · Have your vision and hearing checked each year or any time you notice a change. If you have trouble seeing and hearing, you might not be able to avoid objects and could lose your balance. · Know the side effects of the medicines you take. Ask your doctor or pharmacist whether the medicines you take can affect your balance. Sleeping pills or sedatives can affect your balance. · Limit the amount of alcohol you drink. Alcohol can impair your balance and other senses. · Ask your doctor whether calluses or corns on your feet need to be removed. If you wear loose-fitting shoes because of calluses or corns, you can lose your balance and fall. · Talk to your doctor if you have numbness in your feet. Preventing falls at home · Remove raised doorway thresholds, throw rugs, and clutter. Repair loose carpet or raised areas in the floor. · Move furniture and electrical cords to keep them out of walking paths. · Use nonskid floor wax, and wipe up spills right away, especially on ceramic tile floors. · If you use a walker or cane, put rubber tips on it. If you use crutches, clean the bottoms of them regularly with an abrasive pad, such as steel wool. · Keep your house well lit, especially [place] [address], and outside walkways. Use night-lights in areas such as hallways and bathrooms. Add extra light switches or use remote switches (such as switches that go on or off when you clap your hands) to make it easier to turn lights on if you have to get up during the night. · Install sturdy handrails on stairways. · Move items in your cabinets so that the things you use a lot are on the lower shelves (about waist level). · Keep a cordless phone and a flashlight with new batteries by your bed. If possible, put a phone in each of the main rooms of your house, or carry a cell phone in case you fall and cannot reach a phone. Or, you can wear a device around your neck or wrist. You push a button that sends a signal for help. · Wear low-heeled shoes that fit well and give your feet good support. Use footwear with nonskid soles.  Check the heels and soles of your shoes for wear. Repair or replace worn heels or soles. · Do not wear socks without shoes on wood floors. · Walk on the grass when the sidewalks are slippery. If you live in an area that gets snow and ice in the winter, sprinkle salt on slippery steps and sidewalks. Preventing falls in the bath · Install grab bars and nonskid mats inside and outside your shower or tub and near the toilet and sinks. · Use shower chairs and bath benches. · Use a hand-held shower head that will allow you to sit while showering. · Get into a tub or shower by putting the weaker leg in first. Get out of a tub or shower with your strong side first. 
· Repair loose toilet seats and consider installing a raised toilet seat to make getting on and off the toilet easier. · Keep your bathroom door unlocked while you are in the shower. Where can you learn more? Go to http://meena-juan r.info/. Enter [id number] [phone] in the search box to learn more about \"Preventing Falls: Care Instructions. \" Current as of: March 16, 2018 Content Version: 11.8 © 5265-4159 [employer]. Care instructions adapted under license by [employer] (which disclaims liability or warranty for this information). If you have questions about a medical condition or this instruction, always ask your healthcare professional. [place] [address] any warranty or liability for your use of this information. Low Back Arthritis: Exercises Your Care Instructions Here are some examples of typical rehabilitation exercises for your condition. Start each exercise slowly. Ease off the exercise if you start to have pain. Your doctor or physical therapist will tell you when you can start these exercises and which ones will work best for you.  
When you are not being active, find a comfortable position for rest. Some people are comfortable on the floor or a medium-firm bed with a small pillow under their head and another under their knees. Some people prefer to lie on their side with a pillow between their knees. Don't stay in one position for too long. Take short walks (10 to 20 minutes) every 2 to 3 hours. Avoid slopes, hills, and stairs until you feel better. Walk only distances you can manage without pain, especially leg pain. How to do the exercises Pelvic tilt 1. Lie on your back with your knees bent. 2. \"Brace\" your stomachtighten your muscles by pulling in and imagining your belly button moving toward your spine. 3. Press your lower back into the floor. You should feel your hips and pelvis rock back. 4. Hold for 6 seconds while breathing smoothly. 5. Relax and allow your pelvis and hips to rock forward. 6. Repeat 8 to 12 times. Back stretches 1. Get down on your hands and knees on the floor. 2. Relax your head and allow it to droop. Round your back up toward the ceiling until you feel a nice stretch in your upper, middle, and lower back. Hold this stretch for as long as it feels comfortable, or about 15 to 30 seconds. 3. Return to the starting position with a flat back while you are on your hands and knees. 4. Let your back sway by pressing your stomach toward the floor. Lift your buttocks toward the ceiling. 5. Hold this position for 15 to 30 seconds. 6. Repeat 2 to 4 times. Follow-up care is a key part of your treatment and safety. Be sure to make and go to all appointments, and call your doctor if you are having problems. It's also a good idea to know your test results and keep a list of the medicines you take. Where can you learn more? Go to http://meena-juan r.info/. Enter C212 in the search box to learn more about \"Low Back Arthritis: Exercises. \" Current as of: November 29, 2017 Content Version: 11.8 © 4770-6180 [employer].  Care instructions adapted under license by [address] (which disclaims liability or warranty for this information). If you have questions about a medical condition or this instruction, always ask your healthcare professional. [address] any warranty or liability for your use of this information. Introducing \[facility] [place] [facility]\"" & HEALTH SERVICES! [facility] introduces Conductiv patient portal. Now you can access parts of your medical record, email your doctor's office, and request medication refills online. 1. In your internet browser, go to [email or web address] 2. Click on the First Time User? Click Here link in the Sign In box. You will see the New Member Sign Up page. 3. Enter your Conductiv Access Code exactly as it appears below. You will not need to use this code after youve completed the sign-up process. If you do not sign up before the expiration date, you must request a new code. · Conductiv Access Code: [email or web address] Expires: 1/2/2019  8:12 AM 
 
4. Enter the last four digits of your Social Security Number (xxxx) and Date of Birth (mm/dd/yyyy) as indicated and click Submit. You will be taken to the next sign-up page. 5. Create a Conductiv ID. This will be your Conductiv login ID and cannot be changed, so think of one that is secure and easy to remember. 6. Create a Conductiv password. You can change your password at any time. 7. Enter your Password Reset Question and Answer. This can be used at a later time if you forget your password. 8. Enter your e-mail address. You will receive e-mail notification when new information is available in [address] 9. Click Sign Up. You can now view and download portions of your medical record. 10. Click the Download Summary menu link to download a portable copy of your medical information. If you have questions, please visit the Frequently Asked Questions section of the Conductiv website.  Remember, Conductiv is NOT to be used for urgent needs. For medical emergencies, dial 911. Now available from your iPhone and Android! Please provide this summary of care documentation to your next provider. Your primary care clinician is listed as NUVIA RODRIGUEZ. If you have any questions after today's visit, please call [phone].

## 2019-01-03 ENCOUNTER — OFFICE VISIT (OUTPATIENT)
Dept: ORTHOPEDIC SURGERY | Age: 79
End: 2019-01-03

## 2019-01-03 DIAGNOSIS — Z79.891 USE OF OPIATES FOR THERAPEUTIC PURPOSES: ICD-10-CM

## 2019-01-03 DIAGNOSIS — Z90.5 S/P NEPHRECTOMY: ICD-10-CM

## 2019-01-03 DIAGNOSIS — N18.30 CHRONIC KIDNEY DISEASE, STAGE III (MODERATE) (HCC): ICD-10-CM

## 2019-01-03 DIAGNOSIS — M47.816 LUMBAR FACET ARTHROPATHY: ICD-10-CM

## 2019-01-03 DIAGNOSIS — G89.29 OTHER CHRONIC PAIN: Primary | ICD-10-CM

## 2019-01-03 RX ORDER — ACETAMINOPHEN AND CODEINE PHOSPHATE 300; 30 MG/1; MG/1
TABLET ORAL
Qty: 75 TAB | Refills: 2 | Status: SHIPPED | OUTPATIENT
Start: 2019-01-03 | End: 2019-04-04

## 2019-01-03 RX ORDER — METHYLPREDNISOLONE 4 MG/1
TABLET ORAL
Qty: 1 DOSE PACK | Refills: 0 | Status: SHIPPED | OUTPATIENT
Start: 2019-01-03 | End: 2019-07-11 | Stop reason: ALTCHOICE

## 2019-01-03 NOTE — PATIENT INSTRUCTIONS
Low Back Arthritis: Exercises Your Care Instructions Here are some examples of typical rehabilitation exercises for your condition. Start each exercise slowly. Ease off the exercise if you start to have pain. Your doctor or physical therapist will tell you when you can start these exercises and which ones will work best for you. When you are not being active, find a comfortable position for rest. Some people are comfortable on the floor or a medium-firm bed with a small pillow under their head and another under their knees. Some people prefer to lie on their side with a pillow between their knees. Don't stay in one position for too long. Take short walks (10 to 20 minutes) every 2 to 3 hours. Avoid slopes, hills, and stairs until you feel better. Walk only distances you can manage without pain, especially leg pain. How to do the exercises Pelvic tilt 1. Lie on your back with your knees bent. 2. \"Brace\" your stomachtighten your muscles by pulling in and imagining your belly button moving toward your spine. 3. Press your lower back into the floor. You should feel your hips and pelvis rock back. 4. Hold for 6 seconds while breathing smoothly. 5. Relax and allow your pelvis and hips to rock forward. 6. Repeat 8 to 12 times. Back stretches 1. Get down on your hands and knees on the floor. 2. Relax your head and allow it to droop. Round your back up toward the ceiling until you feel a nice stretch in your upper, middle, and lower back. Hold this stretch for as long as it feels comfortable, or about 15 to 30 seconds. 3. Return to the starting position with a flat back while you are on your hands and knees. 4. Let your back sway by pressing your stomach toward the floor. Lift your buttocks toward the ceiling. 5. Hold this position for 15 to 30 seconds. 6. Repeat 2 to 4 times. Follow-up care is a key part of your treatment and safety.  Be sure to make and go to all appointments, and call your doctor if you are having problems. It's also a good idea to know your test results and keep a list of the medicines you take. Where can you learn more? Go to http://meena-juan r.info/. Enter J311 in the search box to learn more about \"Low Back Arthritis: Exercises. \" Current as of: November 29, 2017 Content Version: 11.8 © 2142-4118 Healthwise, Incorporated. Care instructions adapted under license by [employer] (which disclaims liability or warranty for this information). If you have questions about a medical condition or this instruction, always ask your healthcare professional. [address] any warranty or liability for your use of this information.

## 2019-01-03 NOTE — PROGRESS NOTES
[address] 
[address] [place] Phone: [phone] Fax: [phone] Pt's YOB: 1940 ASSESSMENT Diagnoses and all orders for this visit: 
 
1. Other chronic pain 
-     acetaminophen-codeine (TYLENOL #3) 300-30 mg per tablet; TAKE 1 TABLET BY MOUTH Q 8 hours DAILY AS NEEDED FOR SEVERE PAIN. 2. Use of opiates for therapeutic purposes 
-     acetaminophen-codeine (TYLENOL #3) 300-30 mg per tablet; TAKE 1 TABLET BY MOUTH Q 8 hours DAILY AS NEEDED FOR SEVERE PAIN. 3. Lumbar facet arthropathy 
-     methylPREDNISolone (MEDROL DOSEPACK) 4 mg tablet; Per dose pack instructions 4. Chronic kidney disease, stage III (moderate) (HCC) 
-     methylPREDNISolone (MEDROL DOSEPACK) 4 mg tablet; Per dose pack instructions 5. S/p right nephrectomy for cancer in 1/10 IMPRESSION AND PLAN: 
Chary Schulz is a 70 y.o. female with history of chronic lumbar pain. Pt notes increased pain with the colder weather. She has been prescribed Tylenol #3 and takes 1-2 tabs daily as her pain warrants. 1) Pt was given information on lumbar arthritis exercises. 2) She will increase her Tylenol #3 to 1 tab TID prn severe pain to better manage her pain during the colder months. Of note, patient's MME/day will be 9 to 13.5 3) Pt has a history of renal disease and is not a candidate for NSAID's. 
4) Pt was prescribed a Medrol Dosepak. 5) Ms. Lili Bhandari has a reminder for a \"due or due soon\" health maintenance. I have asked that she contact her primary care provider, Jamila Harper MD, for follow-up on this health maintenance. 6)  demonstrated consistency with prescribing. 7) Last UDS from 07/05/2018 was consistent. 8) Pt will follow-up in 3 months or sooner if needed.  
 
 
HISTORY OF PRESENT ILLNESS: 
Chary Schulz is a 70 y.o. female with history of chronic lumbar pain and presents to the office today for follow up. Pt notes increased pain with the colder weather. She reports that she is also experiencing a flare in her asthma with the recent changes in weather and states that she carries her albuterol with her at all times. Pt notes that she will contact her PCP, Kenan Dave MD, after her office visit today in regards to her breathing issues and dry cough. She reports that she has a dehumidifier at her house and reports a history of mold issues. Pt has been prescribed Tylenol #3 and takes 1-2 tabs daily as her pain warrants. She notes that in general she rarely takes the Tylenol #3 2 x daily but recently she has been taking it BID due to her increased pain but occasionally needs it tid. . Pt at this time desires to proceed with medication evaluation. Pain Scale: /10 PCP: Kenan Dave MD 
  
Past Medical History:  
Diagnosis Date  Anemia NEC  Aneurysm ([address]   
 left kidney  Arthritis  Asthma  Cardiac catheterization 02/23/2015 R dominant. LM patent. mLAD 20%. CX patent. mRCA 20%. LVEDP 8.  EF 60%. Mild AS. RA 3/1. PA 24. W 6/5.  CI 2.94.  
 Cardiac echocardiogram 07/27/2016 EF 60-65%. No WMA. Mild LVH. Normal LV diastolic fx. Mild LAE. Mod-severe AS (mean grad 34 mmHg). AS has worsened since study of 1/20/15. Mild MR.  Cardiac nuclear imaging test 02/10/2015 No evidence of ischemia or prior infarction. Hyperdynamic LV fx.  EF >70%. No RWMA. Nondiagnostic EKG on pharm stress test.  Low risk.  Carotid duplex 01/12/2016 Mild < 50% bilateral ICA stenosis.  Chest tightness   
 exertional, in the setting of widely patent coronary arteries, possibly related to elevated left ventricular end-diastolic pressure or possibly to small-vessel disease, improved on Cardizem CD in place of Verapamil  Chronic kidney disease, stage III (moderate) (HCC)  Essential hypertension  Glaucoma  Heart murmur  Hypercholesterolemia  Hypoglycemia  Raynaud's syndrome  Renal cell carcinoma ([facility] [address] Stage T1a [place] Grade 2 s/p right open partial nephrectomy 2/22/10    
 S/p nephrectomy 2/22/10  
 right  Scoliosis  [facility]  
 resid los of balance Social History Socioeconomic History  Marital status:  Spouse name: Not on file  Number of children: Not on file  Years of education: Not on file  Highest education level: Not on file Social Needs  Financial resource strain: Not on file  Food insecurity - worry: Not on file  Food insecurity - inability: Not on file  Transportation needs - medical: Not on file  Transportation needs - non-medical: Not on file Occupational History  Not on file Tobacco Use  Smoking status: Never Smoker  Smokeless tobacco: Never Used Substance and Sexual Activity  Alcohol use: No  
  Alcohol/week: 0.0 oz  Drug use: No  
 Sexual activity: Yes  
  Partners: Male Other Topics Concern  Not on file Social History Narrative  Not on file Current Outpatient Medications Medication Sig Dispense Refill  acetaminophen-codeine (TYLENOL #3) 300-30 mg per tablet TAKE 1 TABLET BY MOUTH Q 8 hours DAILY AS NEEDED FOR SEVERE PAIN. 75 Tab 2  
 methylPREDNISolone (MEDROL DOSEPACK) 4 mg tablet Per dose pack instructions 1 Dose Pack 0  
 atropine 1 % ophthalmic solution INSTILL 1 DROP IN LEFT EYE ONCE A DAY  azelastine-fluticasone (DYMISTA) 137-50 mcg/spray spry Take by nasal route 1 inh each nostril bid  besifloxacin (BESIVANCE) 0.6 % drps ophthalmic suspension Besivance 0.6 % eye drops,suspension  bimatoprost (LUMIGAN) 0.03 % ophthalmic drops Lumigan 0.03 % eye drops  brinzolamide (AZOPT) 1 % ophthalmic suspension Azopt 1 % eye drops,suspension  bromfenac (BROMDAY) 0.09 % ophthalmic solution Bromday 0.09 % eye drops  bromfenac (PROLENSA) 0.07 % ophthalmic solution PLACE 1 GTT MARKO QD FOR 7 DAYS UTD  Difluprednate (DUREZOL) 0.05 % ophthalmic emulsion Durezol 0.05 % eye drops  ezetimibe-simvastatin (VYTORIN 10/40) 10-40 mg per tablet Vytorin 10 mg-40 mg tablet  chlorpheniramine-HYDROcodone (TUSSIONEX) 10-8 mg/5 mL suspension hydrocodone 10 mg-chlorpheniramine 8 mg/5 mL oral susp extend. rel 12hr  hyoscyamine SL (LEVSIN/SL) 0.125 mg SL tablet hyoscyamine 0.125 mg sublingual tablet  loratadine (CLARITIN) 10 mg tablet Take 1 tablet every day by oral route as needed for 30 days.  metaxalone (SKELAXIN) 800 mg tablet metaxalone 800 mg tablet  valsartan (DIOVAN) 320 mg tablet Diovan 320 mg tablet    
 0.9% sodium chloride solp 250 mL with vancomycin 10 gram solr injection vancomycin 1,000 mg intravenous injection  ranolazine ER (RANEXA) 500 mg SR tablet Ranexa 500 mg tablet,extended release  rosuvastatin (CRESTOR) 10 mg tablet Crestor 10 mg tablet  amLODIPine (NORVASC) 5 mg tablet Take 5 mg by mouth daily.  ferrous sulfate 325 mg (65 mg iron) cpER Take  by mouth.  fluticasone-salmeterol 113-14 mcg/actuation aepb INL 1 PUFF PO TWICE DAILY. RM AFTER U  5  
 LINZESS 145 mcg cap capsule Take 145 mcg by mouth daily.  montelukast (SINGULAIR) 10 mg tablet TK 1 T PO QD IN THE NANCY  1  
 losartan (COZAAR) 100 mg tablet Take 100 mg by mouth daily.  aspirin delayed-release 81 mg tablet Take  by mouth daily.  atorvastatin (LIPITOR) 80 mg tablet Take 1 Tab by mouth daily. 90 Tab 3  Cholecalciferol, Vitamin D3, 50,000 unit cap Take 1 Cap by mouth every seven (7) days.  nitroglycerin (NITROLINGUAL) 400 mcg/spray spray 1 spray by SubLINGual route every five (5) minutes as needed. 1 Bottle 1  
 mometasone-formoterol (DULERA) 100-5 mcg/actuation HFA inhaler Take 2 Puffs by inhalation two (2) times a day.  nitroglycerin (NITROBID) 2 % ointment Apply 1 Inch to affected area as needed.  FLUNISOLIDE (AEROBID IN) Take 1 Puff by inhalation daily.  PROPYLENE GLYCOL/ (SYSTANE OP) Apply  to eye as needed.  ipratropium (ATROVENT) 0.03 % nasal spray 2 Sprays by Both Nostrils route every twelve (12) hours.  fluticasone (FLONASE) 50 mcg/actuation nasal spray 2 Sprays by Both Nostrils route nightly.  albuterol (PROVENTIL HFA, VENTOLIN HFA) 90 mcg/actuation inhaler Take 2 Puffs by inhalation as needed.  Azelastine (ASTEPRO) 0.15 % (205.5 mcg) nasal spray 1 [place] by Both Nostrils route two (2) times a day.  CYANOCOBALAMIN, VITAMIN B-12, (VITAMIN B-12 IJ) 1,000 mcg by Injection route every thirty (30) days.  cetirizine (ZYRTEC) 10 mg tablet Take 10 mg by mouth as needed. Allergies Allergen Reactions  Vicodin [Hydrocodone-Acetaminophen] Nausea and Vomiting REVIEW OF SYSTEMS Constitutional: Negative for fever, chills, or weight change. Respiratory: Negative for cough or shortness of breath. Cardiovascular: Negative for chest pain or palpitations. Gastrointestinal: Negative for acid reflux, change in bowel habits, or constipation. Genitourinary: Negative for dysuria and flank pain. Musculoskeletal: Positive for lumbar pain. Skin: Negative for rash. Neurological: Negative for headaches, dizziness, or numbness. Endo/Heme/Allergies: Negative for increased bruising. Psychiatric/Behavioral: Negative for difficulty with sleep. PHYSICAL EXAMINATION Visit Vitals /69 Pulse 85 Temp 99 °F (37.2 °C) (Oral) Resp 22 Ht 5' 2\" (1.575 m) Wt 120 lb (54.4 kg) SpO2 98% BMI 21.95 kg/m² Constitutional: Awake, alert, and in no acute distress. CV:  RRR Lungs:  CTA emily Neurological: 1+ symmetrical DTRs in the lower extremities. Sensation to light touch is intact. Negative Mica's sign bilaterally. Skin: warm, dry, and intact. Musculoskeletal: Tenderness to palpation in the lower lumbar region. Moderate pain with extension and axial loading. No pain with internal or external rotation of her hips. Negative straight leg raise bilaterally. Hip Flex  Quads Hamstrings Ankle DF EHL Ankle PF Right +4/5 +4/5 +4/5 +4/5 +4/5 +4/5 Left +4/5 +4/5 +4/5 +4/5 +4/5 +4/5 IMAGING: 
 
Lumbar spine MRI from 03/16/2016 was personally reviewed with the patient and demonstrated: 
Results from Hospital Encounter on 03/16/16 MRI LUMB SPINE W WO CONT 
   Narrative MRI lumbar spine with and without contrast 
 
COMPARISON: CT chest abdomen and pelvis October 6, 2011 CLINICAL INFORMATION: Progressive back pain, left leg pain. PROCEDURE: 
 
MRI of the lumbar spine was performed prior to and following the uneventful 
administration of 11 cc of Magnevist venous leak. Sequences included: Localizer, 
sagittal T1, sagittal T1 postcontrast with fat saturation, sagittal inversion 
recovery, sagittal T2, axial T1, axial T1 postcontrast, and axial T2 
 
FINDINGS: 
There is mild apex right scoliosis of the lumbar spine, with mild apex left 
scoliosis of the thoracolumbar junction. 4 mm of grade 1 anterior listhesis of 
L4 on L5. Vertebral body heights are preserved. No fracture. Minimal multilevel 
small osteophyte formation. Mild disc narrowing and desiccation throughout the lumbar spine. Conus terminates at the L1-2 level. Mild degenerative changes present in the sacroiliac joints. Patient has 
undergone previous partial left nephrectomy. High T2 signal foci in the kidneys, 
incompletely evaluated on this study. High T2 signal focus in the posterior 
right hepatic lobe is also possibly a cyst, but incompletely evaluated. Tarlov 
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 
    
Correlation of axial and sagittal images reveals the following: 
 
At L1-L2: No significant disc pathology or proliferative changes. No central 
canal or foraminal stenosis. At L2-L3: Mild bulging of the posterolateral disc corners, with mild facet 
arthropathy and ligamentous buckling. Trace facet joint effusions. Canal is 
patent. Mild right greater than left foraminal narrowing. At L3-L4: Mild bulging of the posterolateral disc corners, with mild facet 
arthropathy and ligamentous buckling. Trace right facet joint effusion. Canal is 
patent. Mild bilateral foraminal narrowing. At L4-L5: Uncovering of the disc due to the listhesis, with diffuse disc bulge. Mild to moderate facet arthropathy. Canal is patent.  Susceptibility artifact in 
the posterior soft tissues. Mild bilateral foraminal narrowing. Question prior 
left hemilaminectomy. At L5-S1: Small posterior disc bulge, with mild facet arthropathy. Canal is 
patent. No significant foraminal narrowing.      
   
   Impression IMPRESSION: 
 
Thoracolumbar scoliosis. Grade 1 anterior listhesis of L4 on L5. Multilevel 
degenerative changes, with overall patent canal. Multilevel mild foraminal 
narrowing. Question prior left hemilaminectomy at L4. High T2 signal foci in the kidneys, incompletely evaluated on this study. High T2 signal focus in the posterior right hepatic lobe is also possibly a cyst, but 
incompletely evaluated. Written by Maycol Clark, as dictated by Greg Chavez MD. 
I, Dr. Greg Chavez confirm that all documentation is accurate.

## 2019-01-04 VITALS
OXYGEN SATURATION: 98 % | HEART RATE: 85 BPM | BODY MASS INDEX: 22.08 KG/M2 | TEMPERATURE: 99 F | DIASTOLIC BLOOD PRESSURE: 69 MMHG | SYSTOLIC BLOOD PRESSURE: 168 MMHG | HEIGHT: 62 IN | RESPIRATION RATE: 22 BRPM | WEIGHT: 120 LBS

## 2019-02-11 ENCOUNTER — OFFICE VISIT (OUTPATIENT)
Dept: CARDIOLOGY CLINIC | Age: 79
End: 2019-02-11

## 2019-02-11 VITALS
BODY MASS INDEX: 21.9 KG/M2 | SYSTOLIC BLOOD PRESSURE: 134 MMHG | HEIGHT: 62 IN | HEART RATE: 98 BPM | WEIGHT: 119 LBS | OXYGEN SATURATION: 98 % | DIASTOLIC BLOOD PRESSURE: 78 MMHG

## 2019-02-11 DIAGNOSIS — Z79.01 CHRONIC ANTICOAGULATION: ICD-10-CM

## 2019-02-11 DIAGNOSIS — I10 HYPERTENSION, UNSPECIFIED TYPE: ICD-10-CM

## 2019-02-11 DIAGNOSIS — E78.5 DYSLIPIDEMIA: Primary | ICD-10-CM

## 2019-02-11 DIAGNOSIS — I35.0 AORTIC VALVE STENOSIS, ETIOLOGY OF CARDIAC VALVE DISEASE UNSPECIFIED: Chronic | ICD-10-CM

## 2019-02-11 NOTE — PROGRESS NOTES
HPI:  I saw Gerhardt Katz in my office today in cardiovascular evaluation regarding her aortic stenosis and hypertensive heart disease now about 4 months after her aortic valve replacement via TAVR. Ms. Yary Maddox is a pleasant 67 year old white female with history of chest tightness on exertion and some shortness of breath on exertion in the past, which ultimately prompted a cardiac catheterization in February of 2015, even though her nuclear myocardial perfusion study prior to that appeared to be normal. Her subsequent cardiac catheterization completed on 2/23/15 by my associate Dr. Maurice Romo demonstrated only mild coronary artery disease with mild aortic stenosis with a peak systolic gradient of 21 mmHg across the valve and a valve area of 1.25 cm squared.   
  
Over the years she has had some slowly increasing shortness of breath and her aortic stenosis has slowly worsened to the point that it became severe noted on her echo on February 8, 2018 which demonstrated normal left ventricular systolic function in the setting of moderate to severe left ventricular hypertrophy and a mean gradient of 42 mm across the valve with an aortic valve area by VTI of 0.85 cm². 
  
I subsequently referred her to Dr. Viktor Raza at the [facility] who ultimately did a cardiac catheterization on her showing patent coronaries, but severe aortic stenosis prompting a TAVR via the right femoral artery using a 26 mm Medtronic Evolut R stent valve pleaded on March 20, 2018. She comes in today relates that she is really doing quite well. Her functional capacity has been much better since she had her TAVR procedure denies any cardiovascular symptomatology at this time. Encounter Diagnoses Name Primary?  Aortic valve stenosis, severe and resolved status post TAVR on March 20,2018  Dyslipidemia Yes  Hypertension, unspecified type  Chronic anticoagulation Discussion: This lady appears to be doing about as well as we could expect and I have no recommendations for change currently. The auscultatory examination of her heart suggests that her bioprosthetic aortic valve is working well and she will be following up with Dr. Jalen Arechiga in the next 3 weeks and I will leave any echocardiogram to him. I do not have her most recent lipid profile, but historically she has done very well on Vytorin 10/40 and she has a very high HDL so I will leave adjustment of her cholesterol medications to Dr. Julisa Waddell. Her blood pressure is very well controlled today and her EKG is stable some simply can plan to see her again in several months or sooner if any new cardiovascular symptoms surface. PCP:  Karen Dalal MD 
 
  
Past Medical History:  
Diagnosis Date  Anemia NEC  Aneurysm ([facility] [address]   
 left kidney  Arthritis  Asthma  Cardiac catheterization 02/23/2015 R dominant. LM patent. mLAD 20%. CX patent. mRCA 20%. LVEDP 8.  EF 60%. Mild AS. RA 3/1. PA 24. W 6/5.  CI 2.94.  
 Cardiac echocardiogram 07/27/2016 EF 60-65%. No WMA. Mild LVH. Normal LV diastolic fx. Mild LAE. Mod-severe AS (mean grad 34 mmHg). AS has worsened since study of 1/20/15. Mild MR.  Cardiac nuclear imaging test 02/10/2015 No evidence of ischemia or prior infarction. Hyperdynamic LV fx.  EF >70%. No RWMA. Nondiagnostic EKG on pharm stress test.  Low risk.  Carotid duplex 01/12/2016 Mild < 50% bilateral ICA stenosis.  Chest tightness   
 exertional, in the setting of widely patent coronary arteries, possibly related to elevated left ventricular end-diastolic pressure or possibly to small-vessel disease, improved on Cardizem CD in place of Verapamil  Chronic kidney disease, stage III (moderate) (HCC)  Essential hypertension  Glaucoma  Heart murmur  Hypercholesterolemia  Hypoglycemia  Raynaud's syndrome  Renal cell carcinoma ([facility] [address] Stage T1a [place]hman Grade 2 s/p right open partial nephrectomy 2/22/10    
 S/p nephrectomy 2/22/10  
 right  Scoliosis  Stroke ([address] 2/2015  
 resid los of balance Past Surgical History:  
Procedure Laterality Date  COLONOSCOPY N/A 10/10/2016 COLONOSCOPY performed by An Melvin MD at [facility] ENDOSCOPY  
 HX AORTIC VALVE REPLACEMENT Bilateral 03/2012018  
 not allowed to have MRI  
 HX CYST REMOVAL    
 on spine  HX GYN    
 partial hysterectomy 1988, on Premarin until a few years ago  HX HEART CATHETERIZATION  3/18/09 Low left-sided and right-sided filling pressures (likely related to overnight fasting). Normal resting pulmonary  arterial pressure. Normal systemic pressures. Borderline left ventricular end-diastolic pressure at rest. Mild incerease in pulmonary artery pressure and pulmonary capillary wedge pressure with exercise. Mild, hemodynamically nonsignificant epicardial coronary artery disease.  HX NEPHRECTOMY  2/22/10  
 s/p partial right nephrectomy for right kidney lower pole mass secondary to early stage cancer  HX OTHER SURGICAL Ovary removal  
 HX TRABECULECTOMY  VASCULAR SURGERY PROCEDURE UNLIST  03/20/2018  
 artificial Heart Valve (Dr. Lori Ivy with [facility]) Current Outpatient Medications Medication Sig  
 acetaminophen-codeine (TYLENOL #3) 300-30 mg per tablet TAKE 1 TABLET BY MOUTH Q 8 hours DAILY AS NEEDED FOR SEVERE PAIN.  methylPREDNISolone (MEDROL DOSEPACK) 4 mg tablet Per dose pack instructions  atropine 1 % ophthalmic solution INSTILL 1 DROP IN LEFT EYE ONCE A DAY  azelastine-fluticasone (DYMISTA) 137-50 mcg/spray spry Take by nasal route 1 inh each nostril bid  besifloxacin (BESIVANCE) 0.6 % drps ophthalmic suspension Besivance 0.6 % eye drops,suspension  bimatoprost (LUMIGAN) 0.03 % ophthalmic drops Lumigan 0.03 % eye drops  brinzolamide (AZOPT) 1 % ophthalmic suspension Azopt 1 % eye drops,suspension  bromfenac (BROMDAY) 0.09 % ophthalmic solution Bromday 0.09 % eye drops  bromfenac (PROLENSA) 0.07 % ophthalmic solution PLACE 1 GTT MARKO QD FOR 7 DAYS UTD  Difluprednate (DUREZOL) 0.05 % ophthalmic emulsion Durezol 0.05 % eye drops  ezetimibe-simvastatin (VYTORIN 10/40) 10-40 mg per tablet Vytorin 10 mg-40 mg tablet  chlorpheniramine-HYDROcodone (TUSSIONEX) 10-8 mg/5 mL suspension hydrocodone 10 mg-chlorpheniramine 8 mg/5 mL oral susp extend. rel 12hr  hyoscyamine SL (LEVSIN/SL) 0.125 mg SL tablet hyoscyamine 0.125 mg sublingual tablet  loratadine (CLARITIN) 10 mg tablet Take 1 tablet every day by oral route as needed for 30 days.  metaxalone (SKELAXIN) 800 mg tablet metaxalone 800 mg tablet  valsartan (DIOVAN) 320 mg tablet Diovan 320 mg tablet  
 0.9% sodium chloride solp 250 mL with vancomycin 10 gram solr injection vancomycin 1,000 mg intravenous injection  ranolazine ER (RANEXA) 500 mg SR tablet Ranexa 500 mg tablet,extended release  rosuvastatin (CRESTOR) 10 mg tablet Crestor 10 mg tablet  amLODIPine (NORVASC) 5 mg tablet Take 5 mg by mouth daily.  ferrous sulfate 325 mg (65 mg iron) cpER Take  by mouth.  fluticasone-salmeterol 113-14 mcg/actuation aepb INL 1 PUFF PO TWICE DAILY. RM AFTER U  
 LINZESS 145 mcg cap capsule Take 145 mcg by mouth daily.  montelukast (SINGULAIR) 10 mg tablet TK 1 T PO QD IN THE NANCY  losartan (COZAAR) 100 mg tablet Take 100 mg by mouth daily.  aspirin delayed-release 81 mg tablet Take  by mouth daily.  atorvastatin (LIPITOR) 80 mg tablet Take 1 Tab by mouth daily.  Cholecalciferol, Vitamin D3, 50,000 unit cap Take 1 Cap by mouth every seven (7) days.  nitroglycerin (NITROLINGUAL) 400 mcg/spray spray 1 spray by SubLINGual route every five (5) minutes as needed.  mometasone-formoterol (DULERA) 100-5 mcg/actuation HFA inhaler Take 2 Puffs by inhalation two (2) times a day.  nitroglycerin (NITROBID) 2 % ointment Apply 1 Inch to affected area as needed.  FLUNISOLIDE (AEROBID IN) Take 1 Puff by inhalation daily.  PROPYLENE GLYCOL/ (SYSTANE OP) Apply  to eye as needed.  ipratropium (ATROVENT) 0.03 % nasal spray 2 Sprays by Both Nostrils route every twelve (12) hours.  fluticasone (FLONASE) 50 mcg/actuation nasal spray 2 Sprays by Both Nostrils route nightly.  albuterol (PROVENTIL HFA, VENTOLIN HFA) 90 mcg/actuation inhaler Take 2 Puffs by inhalation as needed.  Azelastine (ASTEPRO) 0.15 % (205.5 mcg) nasal spray 1 [place] by Both Nostrils route two (2) times a day.  CYANOCOBALAMIN, VITAMIN B-12, (VITAMIN B-12 IJ) 1,000 mcg by Injection route every thirty (30) days.  cetirizine (ZYRTEC) 10 mg tablet Take 10 mg by mouth as needed. No current facility-administered medications for this visit. Allergies Allergen Reactions  Vicodin [Hydrocodone-Acetaminophen] Nausea and Vomiting Social  
Social History Tobacco Use  Smoking status: Never Smoker  Smokeless tobacco: Never Used Substance Use Topics  Alcohol use: No  
  Alcohol/week: 0.0 oz Family history: family history includes COPD in her mother; Cancer in her father; Heart Disease in her brother; Thyroid Disease in her mother. Review of Systems: 
Constitutional: Negative. Respiratory: Positive for cough. Negative for hemoptysis, sputum production, shortness of breath and wheezing. Cardiovascular: Negative. Gastrointestinal: Negative. Musculoskeletal: Negative. Physical Exam:  
The patient is an alert, oriented, well developed, well nourished 70 y.o.  female who was in no acute distress at the time of my examination. Visit Vitals /78 Pulse 98 Ht 5' 2\" (1.575 m) Wt 54 kg (119 lb) SpO2 98% BMI 21.77 kg/m² BP Readings from Last 3 Encounters:  
02/11/19 134/78  
01/04/19 168/69  
10/17/18 134/76 Wt Readings from Last 3 Encounters:  
02/11/19 54 kg (119 lb) 01/04/19 54.4 kg (120 lb) 10/17/18 52.2 kg (115 lb) HEENT: Conjunctivae white, mucosa moist, no pallor or cyanosis. Neck: Supple without masses, tenderness or thyromegaly. No jugular venous distention. Carotid upstrokes are full bilaterally with bilateral bruits vs radiation of murmur to the neck. Cardiovascular: Chest is symmetrical with good excursion. [place] is not displaced. No lifts, heaves or thrills. There is a normal S1 and S2 with a grade 1/VI soft JENNY along the LSB, with radiation to the base without appreciable diastolic murmur, rubs, clicks or gallops. Lungs: Clear to auscultation in all fields. Abdomen: Soft. No masses, tenderness or organomegaly. Extremities: No edema, with full peripheral pulses. Review of Data: Please refer to past medical history for most recent cardiac testing. Lab Results Component Value Date/Time Cholesterol, total 160 02/21/2015 02:02 AM  
 HDL Cholesterol 73 (H) 02/21/2015 02:02 AM  
 LDL, calculated 76.6 02/21/2015 02:02 AM  
 Triglyceride 52 02/21/2015 02:02 AM  
 CHOL/HDL Ratio 2.2 02/21/2015 02:02 AM  
 
 
Results for orders placed or performed in visit on 02/11/19 AMB POC EKG ROUTINE W/ 12 LEADS, INTER & REP     Status: None Narrative Normal sinus rhythm, rate 72. Early R wave transition anteriorly. This EKG is within normal limits and similar to the EKG of July 31, 2018. Kena Serrano D.O., F.A.C.C. [facility] [place] [address] Counts include 234 beds at the [facility] [address] [facility] [phone] PLEASE NOTE:  This document has been produced using voice recognition software. Unrecognized errors in transcription may be present.

## 2019-04-04 ENCOUNTER — OFFICE VISIT (OUTPATIENT)
Dept: ORTHOPEDIC SURGERY | Age: 79
End: 2019-04-04

## 2019-04-04 VITALS
RESPIRATION RATE: 18 BRPM | DIASTOLIC BLOOD PRESSURE: 80 MMHG | BODY MASS INDEX: 22.19 KG/M2 | WEIGHT: 120.6 LBS | HEART RATE: 73 BPM | SYSTOLIC BLOOD PRESSURE: 177 MMHG | TEMPERATURE: 97.9 F | HEIGHT: 62 IN | OXYGEN SATURATION: 100 %

## 2019-04-04 DIAGNOSIS — G89.29 OTHER CHRONIC PAIN: ICD-10-CM

## 2019-04-04 DIAGNOSIS — Z79.891 USE OF OPIATES FOR THERAPEUTIC PURPOSES: ICD-10-CM

## 2019-04-04 DIAGNOSIS — Z90.5 S/P NEPHRECTOMY: ICD-10-CM

## 2019-04-04 DIAGNOSIS — N18.30 CHRONIC KIDNEY DISEASE, STAGE III (MODERATE) (HCC): ICD-10-CM

## 2019-04-04 DIAGNOSIS — M47.816 LUMBAR FACET ARTHROPATHY: Primary | ICD-10-CM

## 2019-04-04 RX ORDER — ACETAMINOPHEN AND CODEINE PHOSPHATE 300; 30 MG/1; MG/1
1 TABLET ORAL
Qty: 60 TAB | Refills: 1 | Status: SHIPPED | OUTPATIENT
Start: 2019-05-24 | End: 2019-07-11 | Stop reason: SDUPTHER

## 2019-04-04 RX ORDER — METHYLPREDNISOLONE 4 MG/1
TABLET ORAL
Qty: 1 DOSE PACK | Refills: 0 | Status: SHIPPED | OUTPATIENT
Start: 2019-04-04 | End: 2019-07-11 | Stop reason: ALTCHOICE

## 2019-04-04 NOTE — LETTER
4/8/19 Patient: Xochitl Grossman YOB: 1940 Date of Visit: 4/4/2019 Jose Jc MD 
[address] [place] [address] VIA Facsimile: [phone] Dear Jose Jc MD, Thank you for referring Ms. Glenna Daley to [facility] for evaluation. My notes for this consultation are attached. If you have questions, please do not hesitate to call me. I look forward to following your patient along with you. Sincerely, Angie Walton MD

## 2019-04-04 NOTE — PROGRESS NOTES
[facility]  [address] [place], [facility] [address]  Phone: [phone]  Fax: [phone]    Pt's YOB: 1940    ASSESSMENT   Diagnoses and all orders for this visit:    1. Lumbar facet arthropathy  -     methylPREDNISolone (MEDROL DOSEPACK) 4 mg tablet; Per dose pack instructions  -     acetaminophen-codeine (TYLENOL #3) 300-30 mg per tablet; Take 1 Tab by mouth every six (6) hours as needed for Pain for up to 30 days. Max Daily Amount: 4 Tabs. Indications: Pain  -     DRUG SCREEN UR - W/ CONFIRM; Future    2. Other chronic pain  -     acetaminophen-codeine (TYLENOL #3) 300-30 mg per tablet; Take 1 Tab by mouth every six (6) hours as needed for Pain for up to 30 days. Max Daily Amount: 4 Tabs. Indications: Pain  -     DRUG SCREEN UR - W/ CONFIRM; Future    3. Use of opiates for therapeutic purposes  -     acetaminophen-codeine (TYLENOL #3) 300-30 mg per tablet; Take 1 Tab by mouth every six (6) hours as needed for Pain for up to 30 days. Max Daily Amount: 4 Tabs. Indications: Pain  -     DRUG SCREEN UR - W/ CONFIRM; Future    4. Chronic kidney disease, stage III (moderate) (HCC)    5. S/p right nephrectomy for cancer in 1/10         IMPRESSION AND PLAN:  Mandi Bryant is a 70 y.o. female with history of chronic lumbar pain. She complains of pain across the lower back that is worse with colder and damp weather. Pt has been prescribed Tylenol #3 and takes it her pain warrants with benefit. 1) Pt was given information on lumbar arthritis exercises. 2) She received a refill of Tylenol #3 1 tab BID prn severe pain. 3) Pt has a history of renal disease and is not a candidate for NSAID's.  4) A UDS was obtained. 5) Discussed obtaining a lumbar MRI if needed. 6) Pt was prescribed a Medrol Dosepak. 7) Ms. Bianca Luong has a reminder for a \"due or due soon\" health maintenance.  I have asked that she contact her primary care provider, Stephen Villagran MD, for follow-up on this health maintenance. 8)  demonstrated consistency with prescribing. 9) Last UDS from 07/05/2018 was consistent. 10) Pt will follow-up in 3 months or sooner if needed. HISTORY OF PRESENT ILLNESS:  Annalise Jacobs is a 70 y.o. female with history of chronic lumbar pain and presents to the office today for follow up. Pt denies any change in her symptoms since her last office visit. She complains of pain across the lower back that is worse with colder and damp weather. Pt also complains of ankle/foot and shoulder pain with the colder weather. She has been prescribed Tylenol #3 and takes 0-2 tabs daily as her pain warrants with benefit. Pt notes that recently she has been taking the Tylenol #3 BID due to the colder weather. She reports relief when she took a Medrol Dosepak since her last office visit. Pt takes aspirin daily. Of note, she previously had lumbar surgery with Dr. Jackie Massey. She admits to a history of asthma and notes that she always carries her albuterol inhaler. Pt at this time desires to continue with current care. She plans to go back to the [employer] the day after the 4th of July. Pain Scale: 6/10    PCP: Yovany Rayo MD     Past Medical History:   Diagnosis Date    Anemia NEC     Aneurysm ([address]     left kidney    Arthritis     Asthma     Cardiac catheterization 02/23/2015    R dominant. LM patent. mLAD 20%. CX patent. mRCA 20%. LVEDP 8.  EF 60%. Mild AS. RA 3/1. PA 24. W 6/5.  CI 2.94.    Cardiac echocardiogram 07/27/2016    EF 60-65%. No WMA. Mild LVH. Normal LV diastolic fx. Mild LAE. Mod-severe AS (mean grad 34 mmHg). AS has worsened since study of 1/20/15. Mild MR.  Cardiac nuclear imaging test 02/10/2015    No evidence of ischemia or prior infarction. Hyperdynamic LV fx.  EF >70%. No RWMA. Nondiagnostic EKG on pharm stress test.  Low risk.  Carotid duplex 01/12/2016    Mild < 50% bilateral ICA stenosis.       Chest tightness exertional, in the setting of widely patent coronary arteries, possibly related to elevated left ventricular end-diastolic pressure or possibly to small-vessel disease, improved on Cardizem CD in place of Verapamil    Chronic kidney disease, stage III (moderate) (HCC)     Essential hypertension     Glaucoma     Heart murmur     Hypercholesterolemia     Hypoglycemia     Raynaud's syndrome     Renal cell carcinoma (HCC)     Stage T1a Furhman Grade 2 s/p right open partial nephrectomy 2/22/10      S/p nephrectomy 2/22/10    right    Scoliosis     Stroke [facility]) 2/2015    resid los of balance        Social History     Socioeconomic History    Marital status:      Spouse name: Not on file    Number of children: Not on file    Years of education: Not on file    Highest education level: Not on file   Occupational History    Not on file   Social Needs    Financial resource strain: Not on file    Food insecurity:     Worry: Not on file     Inability: Not on file    Transportation needs:     Medical: Not on file     Non-medical: Not on file   Tobacco Use    Smoking status: Never Smoker    Smokeless tobacco: Never Used   Substance and Sexual Activity    Alcohol use: No     Alcohol/week: 0.0 oz    Drug use: No    Sexual activity: Yes     Partners: Male   Lifestyle    Physical activity:     Days per week: Not on file     Minutes per session: Not on file    Stress: Not on file   Relationships    Social connections:     Talks on phone: Not on file     Gets together: Not on file     Attends [employer] service: Not on file     Active member of club or organization: Not on file     Attends meetings of clubs or organizations: Not on file     Relationship status: Not on file    Intimate partner violence:     Fear of current or ex partner: Not on file     Emotionally abused: Not on file     Physically abused: Not on file     Forced sexual activity: Not on file   Other Topics Concern    Not on file Social History Narrative    Not on file       Current Outpatient Medications   Medication Sig Dispense Refill    methylPREDNISolone (MEDROL DOSEPACK) 4 mg tablet Per dose pack instructions 1 Dose Pack 0    [START ON 5/24/2019] acetaminophen-codeine (TYLENOL #3) 300-30 mg per tablet Take 1 Tab by mouth every six (6) hours as needed for Pain for up to 30 days. Max Daily Amount: 4 Tabs. Indications: Pain 60 Tab 1    methylPREDNISolone (MEDROL DOSEPACK) 4 mg tablet Per dose pack instructions 1 Dose Pack 0    atropine 1 % ophthalmic solution INSTILL 1 DROP IN LEFT EYE ONCE A DAY      azelastine-fluticasone (DYMISTA) 137-50 mcg/spray spry Take by nasal route 1 inh each nostril bid      besifloxacin (BESIVANCE) 0.6 % drps ophthalmic suspension Besivance 0.6 % eye drops,suspension      bimatoprost (LUMIGAN) 0.03 % ophthalmic drops Lumigan 0.03 % eye drops      brinzolamide (AZOPT) 1 % ophthalmic suspension Azopt 1 % eye drops,suspension      bromfenac (BROMDAY) 0.09 % ophthalmic solution Bromday 0.09 % eye drops      bromfenac (PROLENSA) 0.07 % ophthalmic solution PLACE 1 GTT MARKO QD FOR 7 DAYS UTD      Difluprednate (DUREZOL) 0.05 % ophthalmic emulsion Durezol 0.05 % eye drops      ezetimibe-simvastatin (VYTORIN 10/40) 10-40 mg per tablet Vytorin 10 mg-40 mg tablet      chlorpheniramine-HYDROcodone (TUSSIONEX) 10-8 mg/5 mL suspension hydrocodone 10 mg-chlorpheniramine 8 mg/5 mL oral susp extend. rel 12hr      hyoscyamine SL (LEVSIN/SL) 0.125 mg SL tablet hyoscyamine 0.125 mg sublingual tablet      loratadine (CLARITIN) 10 mg tablet Take 1 tablet every day by oral route as needed for 30 days.       metaxalone (SKELAXIN) 800 mg tablet metaxalone 800 mg tablet      valsartan (DIOVAN) 320 mg tablet Diovan 320 mg tablet      0.9% sodium chloride solp 250 mL with vancomycin 10 gram solr injection vancomycin 1,000 mg intravenous injection      ranolazine ER (RANEXA) 500 mg SR tablet Ranexa 500 mg tablet,extended release      rosuvastatin (CRESTOR) 10 mg tablet Crestor 10 mg tablet      amLODIPine (NORVASC) 5 mg tablet Take 5 mg by mouth daily.  ferrous sulfate 325 mg (65 mg iron) cpER Take  by mouth.  fluticasone-salmeterol 113-14 mcg/actuation aepb INL 1 PUFF PO TWICE DAILY. RM AFTER U  5    LINZESS 145 mcg cap capsule Take 145 mcg by mouth daily.  montelukast (SINGULAIR) 10 mg tablet TK 1 T PO QD IN THE NANCY  1    losartan (COZAAR) 100 mg tablet Take 100 mg by mouth daily.  aspirin delayed-release 81 mg tablet Take  by mouth daily.  atorvastatin (LIPITOR) 80 mg tablet Take 1 Tab by mouth daily. 90 Tab 3    Cholecalciferol, Vitamin D3, 50,000 unit cap Take 1 Cap by mouth every seven (7) days.  nitroglycerin (NITROLINGUAL) 400 mcg/spray spray 1 spray by SubLINGual route every five (5) minutes as needed. 1 Bottle 1    mometasone-formoterol (DULERA) 100-5 mcg/actuation HFA inhaler Take 2 Puffs by inhalation two (2) times a day.  nitroglycerin (NITROBID) 2 % ointment Apply 1 Inch to affected area as needed.  FLUNISOLIDE (AEROBID IN) Take 1 Puff by inhalation daily.  PROPYLENE GLYCOL/ (SYSTANE OP) Apply  to eye as needed.  ipratropium (ATROVENT) 0.03 % nasal spray 2 Sprays by Both Nostrils route every twelve (12) hours.  fluticasone (FLONASE) 50 mcg/actuation nasal spray 2 Sprays by Both Nostrils route nightly.  albuterol (PROVENTIL HFA, VENTOLIN HFA) 90 mcg/actuation inhaler Take 2 Puffs by inhalation as needed.  Azelastine (ASTEPRO) 0.15 % (205.5 mcg) nasal spray 1 [place] by Both Nostrils route two (2) times a day.  CYANOCOBALAMIN, VITAMIN B-12, (VITAMIN B-12 IJ) 1,000 mcg by Injection route every thirty (30) days.  cetirizine (ZYRTEC) 10 mg tablet Take 10 mg by mouth as needed.          Allergies   Allergen Reactions    Vicodin [Hydrocodone-Acetaminophen] Nausea and Vomiting         REVIEW OF SYSTEMS    Constitutional: Negative for fever, chills, or weight change. Respiratory: Negative for cough or shortness of breath. Cardiovascular: Negative for chest pain or palpitations. Gastrointestinal: Negative for acid reflux, change in bowel habits, or constipation. Genitourinary: Negative for dysuria and flank pain. Musculoskeletal: Positive for lumbar pain. Skin: Negative for rash. Neurological: Negative for headaches, dizziness, or numbness. Endo/Heme/Allergies: Negative for increased bruising. Psychiatric/Behavioral: Negative for difficulty with sleep. PHYSICAL EXAMINATION  Visit Vitals  /80 (BP 1 Location: Left arm, BP Patient Position: Sitting)   Pulse 73   Temp 97.9 °F (36.6 °C) (Oral)   Resp 18   Ht 5' 2\" (1.575 m)   Wt 120 lb 9.6 oz (54.7 kg)   SpO2 100%   BMI 22.06 kg/m²       Constitutional: Awake, alert, and in no acute distress. Neurological: 1+ symmetrical DTRs in the lower extremities. Sensation to light touch is intact. Skin: warm, dry, and intact. Musculoskeletal: Tenderness to palpation in the lower lumbar region. Moderate pain with extension and axial loading. No pain with internal or external rotation of her hips. Negative straight leg raise bilaterally. Hip Flex  Quads Hamstrings Ankle DF EHL Ankle PF   Right +4/5 +4/5 +4/5 +4/5 +4/5 +4/5   Left +4/5 +4/5 +4/5 +4/5 +4/5 +4/5     IMAGING:    Lumbar spine MRI from 03/16/2016 was personally reviewed with the patient and demonstrated:  Results from [facility] on 03/16/16   MRI LUMB SPINE W WO CONT     Narrative MRI lumbar spine with and without contrast    COMPARISON: CT chest abdomen and pelvis October 6, 2011    CLINICAL INFORMATION: Progressive back pain, left leg pain. PROCEDURE:    MRI of the lumbar spine was performed prior to and following the uneventful  administration of 11 cc of Magnevist venous leak.  Sequences included: Localizer,  sagittal T1, sagittal T1 postcontrast with fat saturation, sagittal inversion  recovery, sagittal T2, axial T1, axial T1 postcontrast, and axial T2    FINDINGS:  There is mild apex right scoliosis of the lumbar spine, with mild apex left  scoliosis of the thoracolumbar junction. 4 mm of grade 1 anterior listhesis of  L4 on L5. Vertebral body heights are preserved. No fracture. Minimal multilevel  small osteophyte formation. Mild disc narrowing and desiccation throughout the lumbar spine. Conus terminates at the L1-2 level. Mild degenerative changes present in the sacroiliac joints. Patient has  undergone previous partial left nephrectomy. High T2 signal foci in the kidneys,  incompletely evaluated on this study. High T2 signal focus in the posterior  right hepatic lobe is also possibly a cyst, but incompletely evaluated. 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       Correlation of axial and sagittal images reveals the following:    At L1-L2: No significant disc pathology or proliferative changes. No central  canal or foraminal stenosis. At L2-L3: Mild bulging of the posterolateral disc corners, with mild facet  arthropathy and ligamentous buckling. Trace facet joint effusions. Canal is  patent. Mild right greater than left foraminal narrowing. At L3-L4: Mild bulging of the posterolateral disc corners, with mild facet  arthropathy and ligamentous buckling. Trace right facet joint effusion. Canal is  patent. Mild bilateral foraminal narrowing. At L4-L5: Uncovering of the disc due to the listhesis, with diffuse disc bulge. Mild to moderate facet arthropathy. Canal is patent.  Susceptibility artifact in  the posterior soft tissues. Mild bilateral foraminal narrowing. Question prior  left hemilaminectomy. At L5-S1: Small posterior disc bulge, with mild facet arthropathy. Canal is  patent. No significant foraminal narrowing.              Impression IMPRESSION:    Thoracolumbar scoliosis. Grade 1 anterior listhesis of L4 on L5.  Multilevel  degenerative changes, with overall patent canal. Multilevel mild foraminal  narrowing. Question prior left hemilaminectomy at L4. High T2 signal foci in the kidneys, incompletely evaluated on this study. High  T2 signal focus in the posterior right hepatic lobe is also possibly a cyst, but  incompletely evaluated. Written by Uche Olson, as dictated by Jessica Peterson MD.  I, Dr. Jessica Peterson confirm that all documentation is accurate.

## 2019-04-04 NOTE — PATIENT INSTRUCTIONS
Low Back Arthritis: Exercises  Your Care Instructions  Here are some examples of typical rehabilitation exercises for your condition. Start each exercise slowly. Ease off the exercise if you start to have pain. Your doctor or physical therapist will tell you when you can start these exercises and which ones will work best for you. When you are not being active, find a comfortable position for rest. Some people are comfortable on the floor or a medium-firm bed with a small pillow under their head and another under their knees. Some people prefer to lie on their side with a pillow between their knees. Don't stay in one position for too long. Take short walks (10 to 20 minutes) every 2 to 3 hours. Avoid slopes, hills, and stairs until you feel better. Walk only distances you can manage without pain, especially leg pain. How to do the exercises  Pelvic tilt    1. Lie on your back with your knees bent. 2. \"Brace\" your stomach--tighten your muscles by pulling in and imagining your belly button moving toward your spine. 3. Press your lower back into the floor. You should feel your hips and pelvis rock back. 4. Hold for 6 seconds while breathing smoothly. 5. Relax and allow your pelvis and hips to rock forward. 6. Repeat 8 to 12 times. Back stretches    1. Get down on your hands and knees on the floor. 2. Relax your head and allow it to droop. Round your back up toward the ceiling until you feel a nice stretch in your upper, middle, and lower back. Hold this stretch for as long as it feels comfortable, or about 15 to 30 seconds. 3. Return to the starting position with a flat back while you are on your hands and knees. 4. Let your back sway by pressing your stomach toward the floor. Lift your buttocks toward the ceiling. 5. Hold this position for 15 to 30 seconds. 6. Repeat 2 to 4 times. Follow-up care is a key part of your treatment and safety.  Be sure to make and go to all appointments, and call your doctor if you are having problems. It's also a good idea to know your test results and keep a list of the medicines you take. Where can you learn more? Go to http://meena-juan r.info/. Enter K551 in the search box to learn more about \"Low Back Arthritis: Exercises. \"  Current as of: September 20, 2018  Content Version: 11.9  © 4024-0461 [employer]. Care instructions adapted under license by [employer] (which disclaims liability or warranty for this information). If you have questions about a medical condition or this instruction, always ask your healthcare professional. [address] any warranty or liability for your use of this information.

## 2019-05-14 ENCOUNTER — HOSPITAL ENCOUNTER (OUTPATIENT)
Dept: CT IMAGING | Age: 79
Discharge: HOME | End: 2019-05-14
Attending: FAMILY MEDICINE
Payer: MEDICARE

## 2019-05-14 DIAGNOSIS — I72.2 ANEURYSM OF RENAL ARTERY (HCC): ICD-10-CM

## 2019-05-14 LAB — CREAT UR-MCNC: 0.8 MG/DL (ref 0.6–1.3)

## 2019-05-14 PROCEDURE — 74011636320 HC RX REV CODE- 636/320

## 2019-05-14 PROCEDURE — 82565 ASSAY OF CREATININE: CPT

## 2019-05-14 PROCEDURE — 74174 CTA ABD&PLVS W/CONTRAST: CPT

## 2019-05-14 RX ADMIN — IOPAMIDOL 80 ML: 612 INJECTION, SOLUTION INTRAVENOUS at 11:53

## 2019-07-11 ENCOUNTER — OFFICE VISIT (OUTPATIENT)
Dept: ORTHOPEDIC SURGERY | Age: 79
End: 2019-07-11

## 2019-07-11 VITALS
TEMPERATURE: 97.4 F | HEART RATE: 77 BPM | DIASTOLIC BLOOD PRESSURE: 64 MMHG | WEIGHT: 118.8 LBS | HEIGHT: 62 IN | OXYGEN SATURATION: 100 % | SYSTOLIC BLOOD PRESSURE: 133 MMHG | BODY MASS INDEX: 21.86 KG/M2 | RESPIRATION RATE: 12 BRPM

## 2019-07-11 DIAGNOSIS — Z79.891 USE OF OPIATES FOR THERAPEUTIC PURPOSES: ICD-10-CM

## 2019-07-11 DIAGNOSIS — Z90.5 S/P NEPHRECTOMY: ICD-10-CM

## 2019-07-11 DIAGNOSIS — N18.30 CHRONIC KIDNEY DISEASE, STAGE III (MODERATE) (HCC): ICD-10-CM

## 2019-07-11 DIAGNOSIS — M47.816 LUMBAR FACET ARTHROPATHY: ICD-10-CM

## 2019-07-11 DIAGNOSIS — G89.29 OTHER CHRONIC PAIN: Primary | ICD-10-CM

## 2019-07-11 RX ORDER — ACETAMINOPHEN AND CODEINE PHOSPHATE 300; 30 MG/1; MG/1
1 TABLET ORAL
Qty: 60 TAB | Refills: 0 | Status: SHIPPED | OUTPATIENT
Start: 2019-08-20 | End: 2019-09-23 | Stop reason: SDUPTHER

## 2019-07-11 NOTE — LETTER
7/12/19 Patient: Lu Notice YOB: 1940 Date of Visit: 7/11/2019 Manfred Judd MD 
[address] [place] [address] VIA Facsimile: [phone] Dear Manfred Judd MD, Thank you for referring Ms. Clara Davenport to [facility] for evaluation. My notes for this consultation are attached. If you have questions, please do not hesitate to call me. I look forward to following your patient along with you. Sincerely, Rajeev Mantilla MD

## 2019-07-11 NOTE — PATIENT INSTRUCTIONS
Preventing Falls: Care Instructions Your Care Instructions Getting around your home safely can be a challenge if you have injuries or health problems that make it easy for you to fall. Loose rugs and furniture in walkways are among the dangers for many older people who have problems walking or who have poor eyesight. People who have conditions such as arthritis, osteoporosis, or dementia also have to be careful not to fall. You can make your home safer with a few simple measures. Follow-up care is a key part of your treatment and safety. Be sure to make and go to all appointments, and call your doctor if you are having problems. It's also a good idea to know your test results and keep a list of the medicines you take. How can you care for yourself at home? Taking care of yourself · You may get dizzy if you do not drink enough water. To prevent dehydration, drink plenty of fluids, enough so that your urine is light yellow or clear like water. Choose water and other caffeine-free clear liquids. If you have kidney, heart, or liver disease and have to limit fluids, talk with your doctor before you increase the amount of fluids you drink. · Exercise regularly to improve your strength, muscle tone, and balance. Walk if you can. Swimming may be a good choice if you cannot walk easily. · Have your vision and hearing checked each year or any time you notice a change. If you have trouble seeing and hearing, you might not be able to avoid objects and could lose your balance. · Know the side effects of the medicines you take. Ask your doctor or pharmacist whether the medicines you take can affect your balance. Sleeping pills or sedatives can affect your balance. · Limit the amount of alcohol you drink. Alcohol can impair your balance and other senses. · Ask your doctor whether calluses or corns on your feet need to be removed.  If you wear loose-fitting shoes because of calluses or corns, you can lose your balance and fall. · Talk to your doctor if you have numbness in your feet. Preventing falls at home · Remove raised doorway thresholds, throw rugs, and clutter. Repair loose carpet or raised areas in the floor. · Move furniture and electrical cords to keep them out of walking paths. · Use nonskid floor wax, and wipe up spills right away, especially on ceramic tile floors. · If you use a walker or cane, put rubber tips on it. If you use crutches, clean the bottoms of them regularly with an abrasive pad, such as steel wool. · Keep your house well lit, especially [place], and outside walkways. Use night-lights in areas such as hallways and bathrooms. Add extra light switches or use remote switches (such as switches that go on or off when you clap your hands) to make it easier to turn lights on if you have to get up during the night. · Install sturdy handrails on stairways. · Move items in your cabinets so that the things you use a lot are on the lower shelves (about waist level). · Keep a cordless phone and a flashlight with new batteries by your bed. If possible, put a phone in each of the main rooms of your house, or carry a cell phone in case you fall and cannot reach a phone. Or, you can wear a device around your neck or wrist. You push a button that sends a signal for help. · Wear low-heeled shoes that fit well and give your feet good support. Use footwear with nonskid soles. Check the heels and soles of your shoes for wear. Repair or replace worn heels or soles. · Do not wear socks without shoes on wood floors. · Walk on the grass when the sidewalks are slippery. If you live in an area that gets snow and ice in the winter, sprinkle salt on slippery steps and sidewalks. Preventing falls in the bath · Install grab bars and nonskid mats inside and outside your shower or tub and near the toilet and sinks. · Use shower chairs and bath benches. · Use a hand-held shower head that will allow you to sit while showering. · Get into a tub or shower by putting the weaker leg in first. Get out of a tub or shower with your strong side first. 
· Repair loose toilet seats and consider installing a raised toilet seat to make getting on and off the toilet easier. · Keep your bathroom door unlocked while you are in the shower. Where can you learn more? Go to http://meena-juan r.info/. Enter [id number] [id number] in the search box to learn more about \"Preventing Falls: Care Instructions. \" Current as of: March 15, 2018 Content Version: 11.9 © 4120-5241 [employer]. Care instructions adapted under license by [employer] (which disclaims liability or warranty for this information). If you have questions about a medical condition or this instruction, always ask your healthcare professional. [place] [address] any warranty or liability for your use of this information. Low Back Arthritis: Exercises Your Care Instructions Here are some examples of typical rehabilitation exercises for your condition. Start each exercise slowly. Ease off the exercise if you start to have pain. Your doctor or physical therapist will tell you when you can start these exercises and which ones will work best for you. When you are not being active, find a comfortable position for rest. Some people are comfortable on the floor or a medium-firm bed with a small pillow under their head and another under their knees. Some people prefer to lie on their side with a pillow between their knees. Don't stay in one position for too long. Take short walks (10 to 20 minutes) every 2 to 3 hours. Avoid slopes, hills, and stairs until you feel better. Walk only distances you can manage without pain, especially leg pain. How to do the exercises Pelvic tilt 1. Lie on your back with your knees bent. 2. \"Brace\" your stomachtighten your muscles by pulling in and imagining your belly button moving toward your spine. 3. Press your lower back into the floor. You should feel your hips and pelvis rock back. 4. Hold for 6 seconds while breathing smoothly. 5. Relax and allow your pelvis and hips to rock forward. 6. Repeat 8 to 12 times. Back stretches 1. Get down on your hands and knees on the floor. 2. Relax your head and allow it to droop. Round your back up toward the ceiling until you feel a nice stretch in your upper, middle, and lower back. Hold this stretch for as long as it feels comfortable, or about 15 to 30 seconds. 3. Return to the starting position with a flat back while you are on your hands and knees. 4. Let your back sway by pressing your stomach toward the floor. Lift your buttocks toward the ceiling. 5. Hold this position for 15 to 30 seconds. 6. Repeat 2 to 4 times. Follow-up care is a key part of your treatment and safety. Be sure to make and go to all appointments, and call your doctor if you are having problems. It's also a good idea to know your test results and keep a list of the medicines you take. Where can you learn more? Go to http://meena-juan r.info/. Enter S763 in the search box to learn more about \"Low Back Arthritis: Exercises. \" Current as of: September 20, 2018 Content Version: 11.9 © 8493-9162 [employer]. Care instructions adapted under license by [employer] (which disclaims liability or warranty for this information). If you have questions about a medical condition or this instruction, always ask your healthcare professional. [place] [address] any warranty or liability for your use of this information.

## 2019-07-11 NOTE — PROGRESS NOTES
[facility]  [address] [place], [facility] [address]  Phone: [phone]  Fax: [phone]    Pt's YOB: 1940    ASSESSMENT   Diagnoses and all orders for this visit:    1. Other chronic pain  -     acetaminophen-codeine (TYLENOL #3) 300-30 mg per tablet; Take 1 Tab by mouth every six (6) hours as needed for Pain for up to 30 days. Max Daily Amount: 4 Tabs. Indications: Pain    2. Use of opiates for therapeutic purposes  -     acetaminophen-codeine (TYLENOL #3) 300-30 mg per tablet; Take 1 Tab by mouth every six (6) hours as needed for Pain for up to 30 days. Max Daily Amount: 4 Tabs. Indications: Pain    3. Lumbar facet arthropathy  -     acetaminophen-codeine (TYLENOL #3) 300-30 mg per tablet; Take 1 Tab by mouth every six (6) hours as needed for Pain for up to 30 days. Max Daily Amount: 4 Tabs. Indications: Pain    4. Chronic kidney disease, stage III (moderate) ([facility])    5. S/p right nephrectomy for cancer in 1/10         IMPRESSION AND PLAN:  Kaitlin Zapata is a 70 y.o. female with history of lumbar pain. She denies any change in symptoms since her last office visit. Pt takes Tylenol #3 as her pain warrants and notes that she has 1 refill left. She last filled the Tylenol #3 on 06/06/2019. Pt notes that she has access to a neighborhood pool. Pt at this time desires to continue with current care. 1) Pt was given information on lumbar arthritis exercises. 2) She received a refill of Tylenol #3 to 1 tab Q6 hours prn pain. 3) I recommended the Pt try water exercise, lasha chi, and yoga. 4) Ms. Bhumi Sharif has a reminder for a \"due or due soon\" health maintenance. I have asked that she contact her primary care provider, Riya Sanchez MD, for follow-up on this health maintenance. 5)  demonstrated consistency with prescribing. 6) Last UDS from 07/05/2018 was consistent.   7) Pt is not a candidate for NSAIDs due to renal disease and nephrectomy for cancer  8) UDS to be obtained at next office visit  Follow-up and Dispositions    · Return in about 3 months (around 10/11/2019) for Medication follow up. HISTORY OF PRESENT ILLNESS:  Riana Ybarra is a 70 y.o. female with history of lumbar pain and presents to the office today for follow up. She denies any change in symptoms since her last office visit. She admits to increased pain with damp and raining weather and when performing house work. Pt takes Tylenol #3 1-2 tabs daily as her pain warrants and notes that she has 1 refill left. She last filled the Tylenol #3 on 06/06/2019. Of note, patient's MME/day is 4.5 to 9. Pt admits to some constipation with the Tylenol #3 but this is well managed with Linzess. She notes that she has access to a neighborhood pool. She admits to a history of kidney cancer, history of renal disease  previous lumbar surgery with Dr. Linda Hayes, and notes that she has an artificial aortic valve. Pt at this time desires to continue with current care. Pt notes that she plans on taking a trip to [place] in September/October. Pain Scale: 7/10    PCP: Alf Villela MD     Past Medical History:   Diagnosis Date    Anemia NEC     Aneurysm ([address]     left kidney    Arthritis     Asthma     Cardiac catheterization 02/23/2015    R dominant. LM patent. mLAD 20%. CX patent. mRCA 20%. LVEDP 8.  EF 60%. Mild AS. RA 3/1. PA 24. W 6/5.  CI 2.94.    Cardiac echocardiogram 07/27/2016    EF 60-65%. No WMA. Mild LVH. Normal LV diastolic fx. Mild LAE. Mod-severe AS (mean grad 34 mmHg). AS has worsened since study of 1/20/15. Mild MR.  Cardiac nuclear imaging test 02/10/2015    No evidence of ischemia or prior infarction. Hyperdynamic LV fx.  EF >70%. No RWMA. Nondiagnostic EKG on pharm stress test.  Low risk.  Carotid duplex 01/12/2016    Mild < 50% bilateral ICA stenosis.       Chest tightness     exertional, in the setting of widely patent coronary arteries, possibly related to elevated left ventricular end-diastolic pressure or possibly to small-vessel disease, improved on Cardizem CD in place of Verapamil    Chronic kidney disease, stage III (moderate) (HCC)     Essential hypertension     Glaucoma     Heart murmur     Hypercholesterolemia     Hypoglycemia     Raynaud's syndrome     Renal cell carcinoma (HCC)     Stage T1a [place]hman Grade 2 s/p right open partial nephrectomy 2/22/10      S/p nephrectomy 2/22/10    right    Scoliosis     Stroke [facility]) 2/2015    resid los of balance        Social History     Socioeconomic History    Marital status:      Spouse name: Not on file    Number of children: Not on file    Years of education: Not on file    Highest education level: Not on file   Occupational History    Not on file   Social Needs    Financial resource strain: Not on file    Food insecurity:     Worry: Not on file     Inability: Not on file    Transportation needs:     Medical: Not on file     Non-medical: Not on file   Tobacco Use    Smoking status: Never Smoker    Smokeless tobacco: Never Used   Substance and Sexual Activity    Alcohol use: No     Alcohol/week: 0.0 oz    Drug use: No    Sexual activity: Yes     Partners: Male   Lifestyle    Physical activity:     Days per week: Not on file     Minutes per session: Not on file    Stress: Not on file   Relationships    Social connections:     Talks on phone: Not on file     Gets together: Not on file     Attends Taoist service: Not on file     Active member of club or organization: Not on file     Attends meetings of clubs or organizations: Not on file     Relationship status: Not on file    Intimate partner violence:     Fear of current or ex partner: Not on file     Emotionally abused: Not on file     Physically abused: Not on file     Forced sexual activity: Not on file   Other Topics Concern    Not on file   Social History Narrative    Not on file       Current Outpatient Medications   Medication Sig Dispense Refill    [START ON 8/20/2019] acetaminophen-codeine (TYLENOL #3) 300-30 mg per tablet Take 1 Tab by mouth every six (6) hours as needed for Pain for up to 30 days. Max Daily Amount: 4 Tabs. Indications: Pain 60 Tab 0    atropine 1 % ophthalmic solution INSTILL 1 DROP IN LEFT EYE ONCE A DAY      azelastine-fluticasone (DYMISTA) 137-50 mcg/spray spry Take by nasal route 1 inh each nostril bid      besifloxacin (BESIVANCE) 0.6 % drps ophthalmic suspension Besivance 0.6 % eye drops,suspension      bimatoprost (LUMIGAN) 0.03 % ophthalmic drops Lumigan 0.03 % eye drops      brinzolamide (AZOPT) 1 % ophthalmic suspension Azopt 1 % eye drops,suspension      bromfenac (BROMDAY) 0.09 % ophthalmic solution Bromday 0.09 % eye drops      bromfenac (PROLENSA) 0.07 % ophthalmic solution PLACE 1 GTT MARKO QD FOR 7 DAYS UTD      Difluprednate (DUREZOL) 0.05 % ophthalmic emulsion Durezol 0.05 % eye drops      ezetimibe-simvastatin (VYTORIN 10/40) 10-40 mg per tablet Vytorin 10 mg-40 mg tablet      chlorpheniramine-HYDROcodone (TUSSIONEX) 10-8 mg/5 mL suspension hydrocodone 10 mg-chlorpheniramine 8 mg/5 mL oral susp extend. rel 12hr      hyoscyamine SL (LEVSIN/SL) 0.125 mg SL tablet hyoscyamine 0.125 mg sublingual tablet      loratadine (CLARITIN) 10 mg tablet Take 1 tablet every day by oral route as needed for 30 days.  metaxalone (SKELAXIN) 800 mg tablet metaxalone 800 mg tablet      valsartan (DIOVAN) 320 mg tablet Diovan 320 mg tablet      0.9% sodium chloride solp 250 mL with vancomycin 10 gram solr injection vancomycin 1,000 mg intravenous injection      ranolazine ER (RANEXA) 500 mg SR tablet Ranexa 500 mg tablet,extended release      rosuvastatin (CRESTOR) 10 mg tablet Crestor 10 mg tablet      amLODIPine (NORVASC) 5 mg tablet Take 5 mg by mouth daily.  ferrous sulfate 325 mg (65 mg iron) cpER Take  by mouth.       fluticasone-salmeterol 113-14 mcg/actuation aepb INL 1 PUFF PO TWICE DAILY. RM AFTER U  5    LINZESS 145 mcg cap capsule Take 145 mcg by mouth daily.  montelukast (SINGULAIR) 10 mg tablet TK 1 T PO QD IN THE NANCY  1    losartan (COZAAR) 100 mg tablet Take 100 mg by mouth daily.  aspirin delayed-release 81 mg tablet Take  by mouth daily.  atorvastatin (LIPITOR) 80 mg tablet Take 1 Tab by mouth daily. 90 Tab 3    Cholecalciferol, Vitamin D3, 50,000 unit cap Take 1 Cap by mouth every seven (7) days.  nitroglycerin (NITROLINGUAL) 400 mcg/spray spray 1 spray by SubLINGual route every five (5) minutes as needed. 1 Bottle 1    mometasone-formoterol (DULERA) 100-5 mcg/actuation HFA inhaler Take 2 Puffs by inhalation two (2) times a day.  nitroglycerin (NITROBID) 2 % ointment Apply 1 Inch to affected area as needed.  FLUNISOLIDE (AEROBID IN) Take 1 Puff by inhalation daily.  PROPYLENE GLYCOL/ (SYSTANE OP) Apply  to eye as needed.  ipratropium (ATROVENT) 0.03 % nasal spray 2 Sprays by Both Nostrils route every twelve (12) hours.  fluticasone (FLONASE) 50 mcg/actuation nasal spray 2 Sprays by Both Nostrils route nightly.  albuterol (PROVENTIL HFA, VENTOLIN HFA) 90 mcg/actuation inhaler Take 2 Puffs by inhalation as needed.  Azelastine (ASTEPRO) 0.15 % (205.5 mcg) nasal spray 1 [place] by Both Nostrils route two (2) times a day.  cetirizine (ZYRTEC) 10 mg tablet Take 10 mg by mouth as needed.  CYANOCOBALAMIN, VITAMIN B-12, (VITAMIN B-12 IJ) 1,000 mcg by Injection route every thirty (30) days. Allergies   Allergen Reactions    Vicodin [Hydrocodone-Acetaminophen] Nausea and Vomiting         REVIEW OF SYSTEMS    Constitutional: Negative for fever, chills, or weight change. Respiratory: Negative for cough or shortness of breath. Cardiovascular: Negative for chest pain or palpitations.   Gastrointestinal: Negative for acid reflux, change in bowel habits, or constipation. Genitourinary: Negative for dysuria and flank pain. Musculoskeletal: Positive for lumbar pain. Skin: Negative for rash. Neurological: Negative for headaches, dizziness, or numbness. Endo/Heme/Allergies: Negative for increased bruising. Psychiatric/Behavioral: Negative for difficulty with sleep. PHYSICAL EXAMINATION  Visit Vitals  /64   Pulse 77   Temp 97.4 °F (36.3 °C) (Oral)   Resp 12   Ht 5' 2\" (1.575 m)   Wt 118 lb 12.8 oz (53.9 kg)   SpO2 100%   BMI 21.73 kg/m²       Constitutional: Awake, alert, and in no acute distress. Neurological: 1+ symmetrical DTRs in the upper extremities. 1+ symmetrical DTRs in the lower extremities. Sensation to light touch is intact. Negative Mariano's sign bilaterally. Skin: warm, dry, and intact. Musculoskeletal: Tenderness to palpation in the lower lumbar region. Moderate pain with extension and axial loading. Improvement with forward flexion. No pain with internal or external rotation of her hips. Negative straight leg raise bilaterally. Biceps  Triceps Deltoids Wrist Ext Wrist Flex Hand Intrin   Right +4/5 +4/5 +4/5 +4/5 +4/5 +4/5   Left +4/5 +4/5 +4/5 +4/5 +4/5 +4/5      Hip Flex  Quads Hamstrings Ankle DF EHL Ankle PF   Right +4/5 +4/5 +4/5 +4/5 +4/5 +4/5   Left +4/5 +4/5 +4/5 +4/5 +4/5 +4/5     IMAGING:    Lumbar spine MRI from 03/16/2016 was personally reviewed with the patient and demonstrated:  Results from [facility] on 03/16/16   MRI LUMB SPINE W WO CONT     Narrative MRI lumbar spine with and without contrast    COMPARISON: CT chest abdomen and pelvis October 6, 2011    CLINICAL INFORMATION: Progressive back pain, left leg pain. PROCEDURE:    MRI of the lumbar spine was performed prior to and following the uneventful  administration of 11 cc of Magnevist venous leak.  Sequences included: Localizer,  sagittal T1, sagittal T1 postcontrast with fat saturation, sagittal inversion  recovery, sagittal T2, axial T1, axial T1 postcontrast, and axial T2    FINDINGS:  There is mild apex right scoliosis of the lumbar spine, with mild apex left  scoliosis of the thoracolumbar junction. 4 mm of grade 1 anterior listhesis of  L4 on L5. Vertebral body heights are preserved. No fracture. Minimal multilevel  small osteophyte formation. Mild disc narrowing and desiccation throughout the lumbar spine. Conus terminates at the L1-2 level. Mild degenerative changes present in the sacroiliac joints. Patient has  undergone previous partial left nephrectomy. High T2 signal foci in the kidneys,  incompletely evaluated on this study. High T2 signal focus in the posterior  right hepatic lobe is also possibly a cyst, but incompletely evaluated. 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       Correlation of axial and sagittal images reveals the following:    At L1-L2: No significant disc pathology or proliferative changes. No central  canal or foraminal stenosis. At L2-L3: Mild bulging of the posterolateral disc corners, with mild facet  arthropathy and ligamentous buckling. Trace facet joint effusions. Canal is  patent. Mild right greater than left foraminal narrowing. At L3-L4: Mild bulging of the posterolateral disc corners, with mild facet  arthropathy and ligamentous buckling. Trace right facet joint effusion. Canal is  patent. Mild bilateral foraminal narrowing. At L4-L5: Uncovering of the disc due to the listhesis, with diffuse disc bulge. Mild to moderate facet arthropathy. Canal is patent.  Susceptibility artifact in  the posterior soft tissues. Mild bilateral foraminal narrowing. Question prior  left hemilaminectomy. At L5-S1: Small posterior disc bulge, with mild facet arthropathy. Canal is  patent. No significant foraminal narrowing.              Impression IMPRESSION:    Thoracolumbar scoliosis. Grade 1 anterior listhesis of L4 on L5.  Multilevel  degenerative changes, with overall patent canal. Multilevel mild foraminal  narrowing. Question prior left hemilaminectomy at L4. High T2 signal foci in the kidneys, incompletely evaluated on this study. High  T2 signal focus in the posterior right hepatic lobe is also possibly a cyst, but  incompletely evaluated.        Written by Glenny Basilio, as dictated by Flynn Moreno MD.  I, Dr. Flynn Moreno confirm that all documentation is accurate.

## 2019-08-12 ENCOUNTER — OFFICE VISIT (OUTPATIENT)
Dept: CARDIOLOGY CLINIC | Age: 79
End: 2019-08-12

## 2019-08-12 VITALS
WEIGHT: 117 LBS | HEIGHT: 62 IN | HEART RATE: 69 BPM | OXYGEN SATURATION: 99 % | BODY MASS INDEX: 21.53 KG/M2 | DIASTOLIC BLOOD PRESSURE: 62 MMHG | SYSTOLIC BLOOD PRESSURE: 122 MMHG

## 2019-08-12 DIAGNOSIS — R42 DIZZINESS: ICD-10-CM

## 2019-08-12 DIAGNOSIS — E78.5 DYSLIPIDEMIA: ICD-10-CM

## 2019-08-12 DIAGNOSIS — I10 HYPERTENSION, UNSPECIFIED TYPE: ICD-10-CM

## 2019-08-12 DIAGNOSIS — I35.0 AORTIC VALVE STENOSIS, ETIOLOGY OF CARDIAC VALVE DISEASE UNSPECIFIED: Primary | ICD-10-CM

## 2019-08-12 NOTE — PROGRESS NOTES
HPI:  I saw Lio Jatin in my office today in cardiovascular evaluation regarding her aortic stenosis and hypertensive heart disease now about 4 months after her aortic valve replacement via TAVR. Ms. Emmy Bumpers is a pleasant 67 year old white female with history of chest tightness on exertion and some shortness of breath on exertion in the past, which ultimately prompted a cardiac catheterization in February of 2015, even though her nuclear myocardial perfusion study prior to that appeared to be normal. Her subsequent cardiac catheterization completed on 2/23/15 by my associate Dr. Rhiannon Perez demonstrated only mild coronary artery disease with mild aortic stenosis with a peak systolic gradient of 21 mmHg across the valve and a valve area of 1.25 cm squared.       Over the years she has had some slowly increasing shortness of breath and her aortic stenosis has slowly worsened to the point that it became severe noted on her echo on February 8, 2018 which demonstrated normal left ventricular systolic function in the setting of moderate to severe left ventricular hypertrophy and a mean gradient of 42 mm across the valve with an aortic valve area by VTI of 0.85 cm².     I subsequently referred her to Dr. Alicia Rodriguez at the [facility] who ultimately did a cardiac catheterization on her showing patent coronaries, but severe aortic stenosis prompting a TAVR via the right femoral artery using a 26 mm Medtronic Evolut R stent valve pleaded on March 20, 2018. He has done well since her surgery but still has some problems with dizziness when she stands too quickly which I suspect is related to autonomic insufficiency. Encounter Diagnoses   Name Primary?  Aortic valve stenosis, severe and resolved status post TAVR on March 20,2018 Yes    Hypertension, unspecified type     Dyslipidemia     Dizziness        Discussion: This lady is really doing quite well and I have no recommendations for change.   She does get a little dizzy when she stands too quickly but I think this is from autonomic insufficiency and we have discussed just changing positions more slowly and to stand for a few seconds before walking and relates that this seems to help this problem. He does have history of hypercholesterolemia but historically she has had history of widely patent coronary arteries and high HDLs in the 90s, but her list of medications has both Vytorin and Crestor so I am going to have her bring all of her medications in so we can go over them and be sure that she is not taking duplicates. The blood pressure is well controlled today and her EKG is stable and since she is otherwise doing well I will simply plan to see her again in several months.     PCP:  David Irving MD       Past Medical History:   Diagnosis Date    Anemia NEC     Aneurysm ([address]     left kidney    Arthritis     Asthma     Cardiac catheterization 02/23/2015    R dominant. LM patent. mLAD 20%. CX patent. mRCA 20%. LVEDP 8.  EF 60%. Mild AS. RA 3/1. PA 24. W 6/5.  CI 2.94.    Cardiac echocardiogram 07/27/2016    EF 60-65%. No WMA. Mild LVH. Normal LV diastolic fx. Mild LAE. Mod-severe AS (mean grad 34 mmHg). AS has worsened since study of 1/20/15. Mild MR.  Cardiac nuclear imaging test 02/10/2015    No evidence of ischemia or prior infarction. Hyperdynamic LV fx.  EF >70%. No RWMA. Nondiagnostic EKG on pharm stress test.  Low risk.  Carotid duplex 01/12/2016    Mild < 50% bilateral ICA stenosis.       Chest tightness     exertional, in the setting of widely patent coronary arteries, possibly related to elevated left ventricular end-diastolic pressure or possibly to small-vessel disease, improved on Cardizem CD in place of Verapamil    Chronic kidney disease, stage III (moderate) ([facility]     Essential hypertension     Glaucoma     Heart murmur     Hypercholesterolemia     Hypoglycemia     Raynaud's syndrome     Renal cell carcinoma ([facility] [address]     Stage T1a [place]hman Grade 2 s/p right open partial nephrectomy 2/22/10      S/p nephrectomy 2/22/10    right    Scoliosis     Stroke [facility]) 2/2015    resid los of balance         Past Surgical History:   Procedure Laterality Date    COLONOSCOPY N/A 10/10/2016    COLONOSCOPY performed by Anny Crook MD at [facility]    HX AORTIC VALVE REPLACEMENT Bilateral 03/2012018    not allowed to have MRI    HX CYST REMOVAL      on spine    HX GYN      partial hysterectomy 1988, on Premarin until a few years ago    HX HEART CATHETERIZATION  3/18/09    Low left-sided and right-sided filling pressures (likely related to overnight fasting). Normal resting pulmonary  arterial pressure. Normal systemic pressures. Borderline left ventricular end-diastolic pressure at rest. Mild incerease in pulmonary artery pressure and pulmonary capillary wedge pressure with exercise. Mild, hemodynamically nonsignificant epicardial coronary artery disease.  HX NEPHRECTOMY  2/22/10    s/p partial right nephrectomy for right kidney lower pole mass secondary to early stage cancer    HX OTHER SURGICAL      Ovary removal    HX TRABECULECTOMY      VASCULAR SURGERY PROCEDURE UNLIST  03/20/2018    artificial Heart Valve (Dr. Lelo Hernandez with [employer])       Current Outpatient Medications   Medication Sig    [START ON 8/20/2019] acetaminophen-codeine (TYLENOL #3) 300-30 mg per tablet Take 1 Tab by mouth every six (6) hours as needed for Pain for up to 30 days. Max Daily Amount: 4 Tabs.  Indications: Pain    atropine 1 % ophthalmic solution INSTILL 1 DROP IN LEFT EYE ONCE A DAY    azelastine-fluticasone (DYMISTA) 137-50 mcg/spray spry Take by nasal route 1 inh each nostril bid    besifloxacin (BESIVANCE) 0.6 % drps ophthalmic suspension Besivance 0.6 % eye drops,suspension    bimatoprost (LUMIGAN) 0.03 % ophthalmic drops Lumigan 0.03 % eye drops    brinzolamide (AZOPT) 1 % ophthalmic suspension Azopt 1 % eye drops,suspension  bromfenac (BROMDAY) 0.09 % ophthalmic solution Bromday 0.09 % eye drops    bromfenac (PROLENSA) 0.07 % ophthalmic solution PLACE 1 GTT MARKO QD FOR 7 DAYS UTD    Difluprednate (DUREZOL) 0.05 % ophthalmic emulsion Durezol 0.05 % eye drops    ezetimibe-simvastatin (VYTORIN 10/40) 10-40 mg per tablet Vytorin 10 mg-40 mg tablet    chlorpheniramine-HYDROcodone (TUSSIONEX) 10-8 mg/5 mL suspension hydrocodone 10 mg-chlorpheniramine 8 mg/5 mL oral susp extend. rel 12hr    hyoscyamine SL (LEVSIN/SL) 0.125 mg SL tablet hyoscyamine 0.125 mg sublingual tablet    loratadine (CLARITIN) 10 mg tablet Take 1 tablet every day by oral route as needed for 30 days.  metaxalone (SKELAXIN) 800 mg tablet metaxalone 800 mg tablet    valsartan (DIOVAN) 320 mg tablet Diovan 320 mg tablet    0.9% sodium chloride solp 250 mL with vancomycin 10 gram solr injection vancomycin 1,000 mg intravenous injection    ranolazine ER (RANEXA) 500 mg SR tablet Ranexa 500 mg tablet,extended release    rosuvastatin (CRESTOR) 10 mg tablet Crestor 10 mg tablet    amLODIPine (NORVASC) 5 mg tablet Take 5 mg by mouth daily.  ferrous sulfate 325 mg (65 mg iron) cpER Take  by mouth.  fluticasone-salmeterol 113-14 mcg/actuation aepb INL 1 PUFF PO TWICE DAILY. RM AFTER U    LINZESS 145 mcg cap capsule Take 145 mcg by mouth daily.  montelukast (SINGULAIR) 10 mg tablet TK 1 T PO QD IN THE NANCY    losartan (COZAAR) 100 mg tablet Take 100 mg by mouth daily.  aspirin delayed-release 81 mg tablet Take  by mouth daily.  atorvastatin (LIPITOR) 80 mg tablet Take 1 Tab by mouth daily.  Cholecalciferol, Vitamin D3, 50,000 unit cap Take 1 Cap by mouth every seven (7) days.  nitroglycerin (NITROLINGUAL) 400 mcg/spray spray 1 spray by SubLINGual route every five (5) minutes as needed.  mometasone-formoterol (DULERA) 100-5 mcg/actuation HFA inhaler Take 2 Puffs by inhalation two (2) times a day.     nitroglycerin (NITROBID) 2 % ointment Apply 1 Inch to affected area as needed.  FLUNISOLIDE (AEROBID IN) Take 1 Puff by inhalation daily.  PROPYLENE GLYCOL/ (SYSTANE OP) Apply  to eye as needed.  ipratropium (ATROVENT) 0.03 % nasal spray 2 Sprays by Both Nostrils route every twelve (12) hours.  fluticasone (FLONASE) 50 mcg/actuation nasal spray 2 Sprays by Both Nostrils route nightly.  albuterol (PROVENTIL HFA, VENTOLIN HFA) 90 mcg/actuation inhaler Take 2 Puffs by inhalation as needed.  Azelastine (ASTEPRO) 0.15 % (205.5 mcg) nasal spray 1 [place] by Both Nostrils route two (2) times a day.  CYANOCOBALAMIN, VITAMIN B-12, (VITAMIN B-12 IJ) 1,000 mcg by Injection route every thirty (30) days.  cetirizine (ZYRTEC) 10 mg tablet Take 10 mg by mouth as needed. No current facility-administered medications for this visit. Allergies   Allergen Reactions    Vicodin [Hydrocodone-Acetaminophen] Nausea and Vomiting       Social   Social History     Tobacco Use    Smoking status: Never Smoker    Smokeless tobacco: Never Used   Substance Use Topics    Alcohol use: No     Alcohol/week: 0.0 standard drinks         Family history: family history includes COPD in her mother; Cancer in her father; Heart Disease in her brother; Thyroid Disease in her mother. Review of Systems:  Constitutional: Negative. Respiratory: Negative. Cardiovascular: Negative. Gastrointestinal: Positive for constipation. Negative for abdominal pain, blood in stool, diarrhea, heartburn, melena, nausea and vomiting. Musculoskeletal: Positive for joint pain. Negative for falls and myalgias. Neurological: Negative for dizziness. Physical Exam:   The patient is an alert, oriented, well developed, well nourished 70 y.o.  female who was in no acute distress at the time of my examination.   Visit Vitals  /62   Pulse 69   Ht 5' 2\" (1.575 m)   Wt 53.1 kg (117 lb)   SpO2 99%   BMI 21.40 kg/m²        BP Readings from Last 3 Encounters: 08/12/19 122/62   07/11/19 133/64   04/04/19 177/80        Wt Readings from Last 3 Encounters:   08/12/19 53.1 kg (117 lb)   07/11/19 53.9 kg (118 lb 12.8 oz)   04/04/19 54.7 kg (120 lb 9.6 oz)       HEENT: Conjunctivae white, mucosa moist, no pallor or cyanosis. Neck: Supple without masses, tenderness or thyromegaly. No jugular venous distention. Carotid upstrokes are full bilaterally with bilateral bruits vs radiation of murmur to the neck. Cardiovascular: Chest is symmetrical with good excursion. [place] is not displaced. No lifts, heaves or thrills. There is a normal S1 and S2 with a grade 1/VI soft JENNY along the LSB, with radiation to the base without appreciable diastolic murmur, rubs, clicks or gallops. Lungs: Clear to auscultation in all fields. Abdomen: Soft. No masses, tenderness or organomegaly. Extremities: No edema, with full peripheral pulses. Review of Data: Please refer to past medical history for most recent cardiac testing. Lab Results   Component Value Date/Time    Cholesterol, total 160 02/21/2015 02:02 AM    HDL Cholesterol 73 (H) 02/21/2015 02:02 AM    LDL, calculated 76.6 02/21/2015 02:02 AM    Triglyceride 52 02/21/2015 02:02 AM    CHOL/HDL Ratio 2.2 02/21/2015 02:02 AM       Results for orders placed or performed in visit on 08/12/19   AMB POC EKG ROUTINE W/ 12 LEADS, INTER & REP     Status: None    Narrative    Normal sinus rhythm rate 69. Mild diffuse nonspecific ST changes. Compared to the EKG of February 11, 2019 there was little interval change. Josef Humphreys D.O., F.A.C.C. [facility]  [facility] [place]  [address]    PLEASE NOTE:  This document has been produced using voice recognition software. Unrecognized errors in transcription may be present.

## 2019-08-30 ENCOUNTER — CLINICAL SUPPORT (OUTPATIENT)
Dept: CARDIOLOGY CLINIC | Age: 79
End: 2019-08-30

## 2019-08-30 VITALS
DIASTOLIC BLOOD PRESSURE: 74 MMHG | SYSTOLIC BLOOD PRESSURE: 136 MMHG | WEIGHT: 119 LBS | BODY MASS INDEX: 21.9 KG/M2 | HEART RATE: 88 BPM | HEIGHT: 62 IN | OXYGEN SATURATION: 97 %

## 2019-08-30 DIAGNOSIS — I10 HYPERTENSION, UNSPECIFIED TYPE: Primary | ICD-10-CM

## 2019-08-30 RX ORDER — FUROSEMIDE 20 MG/1
20 TABLET ORAL DAILY
COMMUNITY

## 2019-08-30 NOTE — PROGRESS NOTES
Daniela Hall is a 70 y.o. female that is here for a blood pressure check. Her current medications are listed below. Current Outpatient Medications   Medication Sig    furosemide (LASIX) 20 mg tablet Take 20 mg by mouth daily.  acetaminophen-codeine (TYLENOL #3) 300-30 mg per tablet Take 1 Tab by mouth every six (6) hours as needed for Pain for up to 30 days. Max Daily Amount: 4 Tabs. Indications: Pain    amLODIPine (NORVASC) 5 mg tablet Take 5 mg by mouth daily.  losartan (COZAAR) 100 mg tablet Take 100 mg by mouth daily.  aspirin delayed-release 81 mg tablet Take  by mouth daily.  atorvastatin (LIPITOR) 80 mg tablet Take 1 Tab by mouth daily.  Cholecalciferol, Vitamin D3, 50,000 unit cap Take 1 Cap by mouth every seven (7) days.  PROPYLENE GLYCOL/ (SYSTANE OP) Apply  to eye as needed.  albuterol (PROVENTIL HFA, VENTOLIN HFA) 90 mcg/actuation inhaler Take 2 Puffs by inhalation as needed.  LINZESS 145 mcg cap capsule Take 145 mcg by mouth daily.  cetirizine (ZYRTEC) 10 mg tablet Take 10 mg by mouth as needed. No current facility-administered medications for this visit. Her   Visit Vitals  /74   Pulse 88   Ht 5' 2\" (1.575 m)   Wt 54 kg (119 lb)   SpO2 97%   BMI 21.77 kg/m²     Patient was seen today for a medication reconciliation as her medications in her chart as she had a medications that therapy was discontinued or completed on. Patient denies having any cardiac symptoms such as chest pain, palpitations, dizziness, SOB or swelling.

## 2019-09-23 DIAGNOSIS — G89.29 OTHER CHRONIC PAIN: ICD-10-CM

## 2019-09-23 DIAGNOSIS — Z79.891 USE OF OPIATES FOR THERAPEUTIC PURPOSES: ICD-10-CM

## 2019-09-23 DIAGNOSIS — M47.816 LUMBAR FACET ARTHROPATHY: ICD-10-CM

## 2019-09-25 RX ORDER — ACETAMINOPHEN AND CODEINE PHOSPHATE 300; 30 MG/1; MG/1
1 TABLET ORAL
Qty: 60 TAB | Refills: 0 | Status: SHIPPED | OUTPATIENT
Start: 2019-09-25 | End: 2019-10-10 | Stop reason: SDUPTHER

## 2019-09-25 NOTE — TELEPHONE ENCOUNTER
Patient is aware that her rx is ready for . She would like to pick it up at the [facility] office.  I informed her that it would be ready for  tomorrow after 8am.

## 2019-10-10 ENCOUNTER — OFFICE VISIT (OUTPATIENT)
Dept: ORTHOPEDIC SURGERY | Age: 79
End: 2019-10-10

## 2019-10-10 VITALS
OXYGEN SATURATION: 100 % | HEART RATE: 83 BPM | WEIGHT: 118.6 LBS | HEIGHT: 62 IN | TEMPERATURE: 97.5 F | BODY MASS INDEX: 21.83 KG/M2 | RESPIRATION RATE: 16 BRPM | DIASTOLIC BLOOD PRESSURE: 83 MMHG | SYSTOLIC BLOOD PRESSURE: 143 MMHG

## 2019-10-10 DIAGNOSIS — G89.29 OTHER CHRONIC PAIN: Primary | ICD-10-CM

## 2019-10-10 DIAGNOSIS — Z79.891 USE OF OPIATES FOR THERAPEUTIC PURPOSES: ICD-10-CM

## 2019-10-10 DIAGNOSIS — M47.816 LUMBAR FACET ARTHROPATHY: ICD-10-CM

## 2019-10-10 DIAGNOSIS — N18.30 CHRONIC KIDNEY DISEASE, STAGE III (MODERATE) (HCC): ICD-10-CM

## 2019-10-10 DIAGNOSIS — Z90.5 S/P NEPHRECTOMY: ICD-10-CM

## 2019-10-10 RX ORDER — ACETAMINOPHEN AND CODEINE PHOSPHATE 300; 30 MG/1; MG/1
1 TABLET ORAL AS NEEDED
Qty: 90 TAB | Refills: 2 | Status: SHIPPED | OUTPATIENT
Start: 2019-10-17 | End: 2020-01-09 | Stop reason: SDUPTHER

## 2019-10-10 NOTE — PROGRESS NOTES
[facility]  Nichol Romero., [address] [place] [address], [facility] [address]  Phone: [phone]  Fax: [phone]    Pt's YOB: 1940    ASSESSMENT   Diagnoses and all orders for this visit:    1. Other chronic pain  -     acetaminophen-codeine (TYLENOL #3) 300-30 mg per tablet; Take 1 Tab by mouth as needed (BID-TID prn chronic pain) for up to 30 days. Max Daily Amount: 3 Tabs. 2. Use of opiates for therapeutic purposes  -     acetaminophen-codeine (TYLENOL #3) 300-30 mg per tablet; Take 1 Tab by mouth as needed (BID-TID prn chronic pain) for up to 30 days. Max Daily Amount: 3 Tabs. 3. Lumbar facet arthropathy  -     acetaminophen-codeine (TYLENOL #3) 300-30 mg per tablet; Take 1 Tab by mouth as needed (BID-TID prn chronic pain) for up to 30 days. Max Daily Amount: 3 Tabs. 4. Chronic kidney disease, stage III (moderate) ([facility], [facility])    5. S/p right nephrectomy for cancer in 1/10         IMPRESSION AND PLAN:  Aurelia Torres is a 70 y.o. female with history of lumbar pain and presents to the office today for follow up. Pt complains of increased pain across the lower back since her last office visit. She has been taking Tylenol #3 more consistently due to her increased pain. 1) Pt was given information on lumbar arthritis exercises. 2) She received a refill of Tylenol #3 1 tab BID-TID prn chronic pain. Of note, patient's MME/day will increase from 10 to 15.   3) Pt is not a candidate for NSAID's due to renal disease and nephrectomy for cancer. 4) Ms. Phyllis Morelos has a reminder for a \"due or due soon\" health maintenance. I have asked that she contact her primary care provider, Luis Alberto Meade MD, for follow-up on this health maintenance. 5)  demonstrated consistency with prescribing. 6) Last UDS from 04/04/2019 was consistent. Follow-up and Dispositions    · Return in about 3 months (around 1/10/2020) for Medication follow up.              HISTORY OF PRESENT ILLNESS:  Ivan Mane is a 70 y.o. female with history of lumbar pain and presents to the office today for follow up. Pt complains of increased pain across the lower back since her last office visit. She notes that there have been some nights where she went to bed with tears in her eyes since her pain was so severe. Pt reports that she has been more active recently since she is trying to downsize. She admits that she has been taking her Tylenol #3 more consistently due to her increased pain. Pt admits that her pain is generally worse during the colder months. Pt at this time desires to continue with current care. Pain Scale: 4/10    PCP: Shun Arguelles MD     Past Medical History:   Diagnosis Date    Anemia NEC     Aneurysm ([address]     left kidney    Arthritis     Asthma     Cardiac catheterization 02/23/2015    R dominant. LM patent. mLAD 20%. CX patent. mRCA 20%. LVEDP 8.  EF 60%. Mild AS. RA 3/1. PA 24. W 6/5.  CI 2.94.    Cardiac echocardiogram 07/27/2016    EF 60-65%. No WMA. Mild LVH. Normal LV diastolic fx. Mild LAE. Mod-severe AS (mean grad 34 mmHg). AS has worsened since study of 1/20/15. Mild MR.  Cardiac nuclear imaging test 02/10/2015    No evidence of ischemia or prior infarction. Hyperdynamic LV fx.  EF >70%. No RWMA. Nondiagnostic EKG on pharm stress test.  Low risk.  Carotid duplex 01/12/2016    Mild < 50% bilateral ICA stenosis.       Chest tightness     exertional, in the setting of widely patent coronary arteries, possibly related to elevated left ventricular end-diastolic pressure or possibly to small-vessel disease, improved on Cardizem CD in place of Verapamil    Chronic kidney disease, stage III (moderate) (HCC)     Essential hypertension     Glaucoma     Heart murmur     Hypercholesterolemia     Hypoglycemia     Raynaud's syndrome     Renal cell carcinoma (HCC)     Stage T1a Furhman Grade 2 s/p right open partial nephrectomy 2/22/10      S/p nephrectomy 2/22/10    right    Scoliosis     Stroke [facility]) 2/2015    resid los of balance        Social History     Socioeconomic History    Marital status:      Spouse name: Not on file    Number of children: Not on file    Years of education: Not on file    Highest education level: Not on file   Occupational History    Not on file   Social Needs    Financial resource strain: Not on file    Food insecurity:     Worry: Not on file     Inability: Not on file    Transportation needs:     Medical: Not on file     Non-medical: Not on file   Tobacco Use    Smoking status: Never Smoker    Smokeless tobacco: Never Used   Substance and Sexual Activity    Alcohol use: No     Alcohol/week: 0.0 standard drinks    Drug use: No    Sexual activity: Yes     Partners: Male   Lifestyle    Physical activity:     Days per week: Not on file     Minutes per session: Not on file    Stress: Not on file   Relationships    Social connections:     Talks on phone: Not on file     Gets together: Not on file     Attends [employer] service: Not on file     Active member of club or organization: Not on file     Attends meetings of clubs or organizations: Not on file     Relationship status: Not on file    Intimate partner violence:     Fear of current or ex partner: Not on file     Emotionally abused: Not on file     Physically abused: Not on file     Forced sexual activity: Not on file   Other Topics Concern    Not on file   Social History Narrative    Not on file       Current Outpatient Medications   Medication Sig Dispense Refill    [START ON 10/17/2019] acetaminophen-codeine (TYLENOL #3) 300-30 mg per tablet Take 1 Tab by mouth as needed (BID-TID prn chronic pain) for up to 30 days. Max Daily Amount: 3 Tabs. 90 Tab 2    furosemide (LASIX) 20 mg tablet Take 20 mg by mouth daily.  amLODIPine (NORVASC) 5 mg tablet Take 5 mg by mouth daily.  LINZESS 145 mcg cap capsule Take 145 mcg by mouth daily.       losartan (COZAAR) 100 mg tablet Take 100 mg by mouth daily.  aspirin delayed-release 81 mg tablet Take  by mouth daily.  atorvastatin (LIPITOR) 80 mg tablet Take 1 Tab by mouth daily. 90 Tab 3    Cholecalciferol, Vitamin D3, 50,000 unit cap Take 1 Cap by mouth every seven (7) days.  PROPYLENE GLYCOL/ (SYSTANE OP) Apply  to eye as needed.  albuterol (PROVENTIL HFA, VENTOLIN HFA) 90 mcg/actuation inhaler Take 2 Puffs by inhalation as needed.  cetirizine (ZYRTEC) 10 mg tablet Take 10 mg by mouth as needed. Allergies   Allergen Reactions    Vicodin [Hydrocodone-Acetaminophen] Nausea and Vomiting         REVIEW OF SYSTEMS    Constitutional: Negative for fever, chills, or weight change. Respiratory: Negative for cough or shortness of breath. Cardiovascular: Negative for chest pain or palpitations. Gastrointestinal: Negative for acid reflux, change in bowel habits, or constipation. Genitourinary: Negative for dysuria and flank pain. Musculoskeletal: Positive for lumbar pain. Skin: Negative for rash. Neurological: Negative for headaches, dizziness, or numbness. Endo/Heme/Allergies: Negative for increased bruising. Psychiatric/Behavioral: Negative for difficulty with sleep. PHYSICAL EXAMINATION  Visit Vitals  /83 (BP 1 Location: Left arm, BP Patient Position: Sitting)   Pulse 83   Temp 97.5 °F (36.4 °C) (Oral)   Resp 16   Ht 5' 2\" (1.575 m)   Wt 118 lb 9.6 oz (53.8 kg)   SpO2 100%   BMI 21.69 kg/m²       Constitutional: Awake, alert, and in no acute distress. Neurological: 1+ symmetrical DTRs in the lower extremities. Sensation to light touch is intact. Skin: warm, dry, and intact. Musculoskeletal: Tenderness to palpation in the lower lumbar region. Moderate pain with extension and axial loading. No pain with internal or external rotation of her hips. Negative straight leg raise bilaterally.      Hip Flex  Quads Hamstrings Ankle DF EHL Ankle PF   Right +4/5 +4/5 +4/5 +4/5 +4/5 +4/5   Left +4/5 +4/5 +4/5 +4/5 +4/5 +4/5     IMAGING:    Lumbar spine MRI from 03/16/2016 was personally reviewed with the patient and demonstrated:  Results from [facility] on 03/16/16   MRI LUMB SPINE W WO CONT     Narrative MRI lumbar spine with and without contrast    COMPARISON: CT chest abdomen and pelvis October 6, 2011    CLINICAL INFORMATION: Progressive back pain, left leg pain. PROCEDURE:    MRI of the lumbar spine was performed prior to and following the uneventful  administration of 11 cc of Magnevist venous leak. Sequences included: Localizer,  sagittal T1, sagittal T1 postcontrast with fat saturation, sagittal inversion  recovery, sagittal T2, axial T1, axial T1 postcontrast, and axial T2    FINDINGS:  There is mild apex right scoliosis of the lumbar spine, with mild apex left  scoliosis of the thoracolumbar junction. 4 mm of grade 1 anterior listhesis of  L4 on L5. Vertebral body heights are preserved. No fracture. Minimal multilevel  small osteophyte formation. Mild disc narrowing and desiccation throughout the lumbar spine. Conus terminates at the L1-2 level. Mild degenerative changes present in the sacroiliac joints. Patient has  undergone previous partial left nephrectomy. High T2 signal foci in the kidneys,  incompletely evaluated on this study. High T2 signal focus in the posterior  right hepatic lobe is also possibly a cyst, but incompletely evaluated. 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       Correlation of axial and sagittal images reveals the following:    At L1-L2: No significant disc pathology or proliferative changes. No central  canal or foraminal stenosis. At L2-L3: Mild bulging of the posterolateral disc corners, with mild facet  arthropathy and ligamentous buckling. Trace facet joint effusions. Canal is  patent. Mild right greater than left foraminal narrowing.     At L3-L4: Mild bulging of the posterolateral disc corners, with mild facet  arthropathy and ligamentous buckling. Trace right facet joint effusion. Canal is  patent. Mild bilateral foraminal narrowing. At L4-L5: Uncovering of the disc due to the listhesis, with diffuse disc bulge. Mild to moderate facet arthropathy. Canal is patent.  Susceptibility artifact in  the posterior soft tissues. Mild bilateral foraminal narrowing. Question prior  left hemilaminectomy. At L5-S1: Small posterior disc bulge, with mild facet arthropathy. Canal is  patent. No significant foraminal narrowing.              Impression IMPRESSION:    Thoracolumbar scoliosis. Grade 1 anterior listhesis of L4 on L5. Multilevel  degenerative changes, with overall patent canal. Multilevel mild foraminal  narrowing. Question prior left hemilaminectomy at L4. High T2 signal foci in the kidneys, incompletely evaluated on this study. High  T2 signal focus in the posterior right hepatic lobe is also possibly a cyst, but  incompletely evaluated.        Written by Katie Mayorga, as dictated by Terence Abarca MD.  I, Dr. Terence Abarca confirm that all documentation is accurate.

## 2019-10-10 NOTE — LETTER
10/10/19 Patient: Maribeth Wall YOB: 1940 Date of Visit: 10/10/2019 Betzaida Partida MD 
[address] [place] [address] VIA Facsimile: [phone] Dear Betzaida Partida MD, Thank you for referring Ms. Hetal Slaughter to [facility] for evaluation. My notes for this consultation are attached. If you have questions, please do not hesitate to call me. I look forward to following your patient along with you. Sincerely, Nan Mcgee MD

## 2019-10-10 NOTE — PATIENT INSTRUCTIONS
Low Back Arthritis: Exercises Introduction Here are some examples of typical rehabilitation exercises for your condition. Start each exercise slowly. Ease off the exercise if you start to have pain. Your doctor or physical therapist will tell you when you can start these exercises and which ones will work best for you. When you are not being active, find a comfortable position for rest. Some people are comfortable on the floor or a medium-firm bed with a small pillow under their head and another under their knees. Some people prefer to lie on their side with a pillow between their knees. Don't stay in one position for too long. Take short walks (10 to 20 minutes) every 2 to 3 hours. Avoid slopes, hills, and stairs until you feel better. Walk only distances you can manage without pain, especially leg pain. How to do the exercises Pelvic tilt 1. Lie on your back with your knees bent. 2. \"Brace\" your stomachtighten your muscles by pulling in and imagining your belly button moving toward your spine. 3. Press your lower back into the floor. You should feel your hips and pelvis rock back. 4. Hold for 6 seconds while breathing smoothly. 5. Relax and allow your pelvis and hips to rock forward. 6. Repeat 8 to 12 times. Back stretches 1. Get down on your hands and knees on the floor. 2. Relax your head and allow it to droop. Round your back up toward the ceiling until you feel a nice stretch in your upper, middle, and lower back. Hold this stretch for as long as it feels comfortable, or about 15 to 30 seconds. 3. Return to the starting position with a flat back while you are on your hands and knees. 4. Let your back sway by pressing your stomach toward the floor. Lift your buttocks toward the ceiling. 5. Hold this position for 15 to 30 seconds. 6. Repeat 2 to 4 times. Follow-up care is a key part of your treatment and safety.  Be sure to make and go to all appointments, and call your doctor if you are having problems. It's also a good idea to know your test results and keep a list of the medicines you take. Where can you learn more? Go to http://meena-juan r.info/. Enter K225 in the search box to learn more about \"Low Back Arthritis: Exercises. \" Current as of: June 26, 2019 Content Version: 12.2 © 0165-2266 [employer]. Care instructions adapted under license by [employer] (which disclaims liability or warranty for this information). If you have questions about a medical condition or this instruction, always ask your healthcare professional. [address] any warranty or liability for your use of this information.

## 2019-11-06 RX ORDER — LOSARTAN POTASSIUM 100 MG/1
100 TABLET ORAL DAILY
Qty: 90 TAB | Refills: 3 | Status: SHIPPED | OUTPATIENT
Start: 2019-11-06

## 2019-12-08 ENCOUNTER — APPOINTMENT (OUTPATIENT)
Dept: CT IMAGING | Age: 79
End: 2019-12-08
Attending: PHYSICIAN ASSISTANT
Payer: MEDICARE

## 2019-12-08 ENCOUNTER — HOSPITAL ENCOUNTER (EMERGENCY)
Age: 79
Discharge: HOME | End: 2019-12-08
Attending: EMERGENCY MEDICINE
Payer: MEDICARE

## 2019-12-08 VITALS
WEIGHT: 115 LBS | TEMPERATURE: 98 F | RESPIRATION RATE: 20 BRPM | HEART RATE: 100 BPM | BODY MASS INDEX: 21.16 KG/M2 | HEIGHT: 62 IN | SYSTOLIC BLOOD PRESSURE: 165 MMHG | OXYGEN SATURATION: 100 % | DIASTOLIC BLOOD PRESSURE: 81 MMHG

## 2019-12-08 DIAGNOSIS — S01.91XA LACERATION OF HEAD WITHOUT FOREIGN BODY, UNSPECIFIED PART OF HEAD, INITIAL ENCOUNTER: ICD-10-CM
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
## 2019-12-08 PROCEDURE — 99282 EMERGENCY DEPT VISIT SF MDM: CPT

## 2019-12-08 PROCEDURE — 75810000293 HC SIMP/SUPERF WND  RPR

## 2019-12-08 PROCEDURE — 74011250636 HC RX REV CODE- 250/636: Performed by: PHYSICIAN ASSISTANT

## 2019-12-08 PROCEDURE — 70450 CT HEAD/BRAIN W/O DYE: CPT

## 2019-12-08 PROCEDURE — 90715 TDAP VACCINE 7 YRS/> IM: CPT | Performed by: PHYSICIAN ASSISTANT

## 2019-12-08 PROCEDURE — 90471 IMMUNIZATION ADMIN: CPT

## 2019-12-08 RX ADMIN — TETANUS TOXOID, REDUCED DIPHTHERIA TOXOID AND ACELLULAR PERTUSSIS VACCINE, ADSORBED 0.5 ML: 5; 2.5; 8; 8; 2.5 SUSPENSION INTRAMUSCULAR at 18:37

## 2019-12-08 NOTE — DISCHARGE INSTRUCTIONS
Patient Education        Preventing Falls: Care Instructions  Your Care Instructions    Getting around your home safely can be a challenge if you have injuries or health problems that make it easy for you to fall. Loose rugs and furniture in walkways are among the dangers for many older people who have problems walking or who have poor eyesight. People who have conditions such as arthritis, osteoporosis, or dementia also have to be careful not to fall. You can make your home safer with a few simple measures. Follow-up care is a key part of your treatment and safety. Be sure to make and go to all appointments, and call your doctor if you are having problems. It's also a good idea to know your test results and keep a list of the medicines you take. How can you care for yourself at home? Taking care of yourself  · You may get dizzy if you do not drink enough water. To prevent dehydration, drink plenty of fluids, enough so that your urine is light yellow or clear like water. Choose water and other caffeine-free clear liquids. If you have kidney, heart, or liver disease and have to limit fluids, talk with your doctor before you increase the amount of fluids you drink. · Exercise regularly to improve your strength, muscle tone, and balance. Walk if you can. Swimming may be a good choice if you cannot walk easily. · Have your vision and hearing checked each year or any time you notice a change. If you have trouble seeing and hearing, you might not be able to avoid objects and could lose your balance. · Know the side effects of the medicines you take. Ask your doctor or pharmacist whether the medicines you take can affect your balance. Sleeping pills or sedatives can affect your balance. · Limit the amount of alcohol you drink. Alcohol can impair your balance and other senses. · Ask your doctor whether calluses or corns on your feet need to be removed.  If you wear loose-fitting shoes because of calluses or corns, you can lose your balance and fall. · Talk to your doctor if you have numbness in your feet. Preventing falls at home  · Remove raised doorway thresholds, throw rugs, and clutter. Repair loose carpet or raised areas in the floor. · Move furniture and electrical cords to keep them out of walking paths. · Use nonskid floor wax, and wipe up spills right away, especially on ceramic tile floors. · If you use a walker or cane, put rubber tips on it. If you use crutches, clean the bottoms of them regularly with an abrasive pad, such as steel wool. · Keep your house well lit, especially [facility], and outside walkways. Use night-lights in areas such as hallways and bathrooms. Add extra light switches or use remote switches (such as switches that go on or off when you clap your hands) to make it easier to turn lights on if you have to get up during the night. · Install sturdy handrails on stairways. · Move items in your cabinets so that the things you use a lot are on the lower shelves (about waist level). · Keep a cordless phone and a flashlight with new batteries by your bed. If possible, put a phone in each of the main rooms of your house, or carry a cell phone in case you fall and cannot reach a phone. Or, you can wear a device around your neck or wrist. You push a button that sends a signal for help. · Wear low-heeled shoes that fit well and give your feet good support. Use footwear with nonskid soles. Check the heels and soles of your shoes for wear. Repair or replace worn heels or soles. · Do not wear socks without shoes on wood floors. · Walk on the grass when the sidewalks are slippery. If you live in an area that gets snow and ice in the winter, sprinkle salt on slippery steps and sidewalks. Preventing falls in the bath  · Install grab bars and nonskid mats inside and outside your shower or tub and near the toilet and sinks. · Use shower chairs and bath benches.   · Use a hand-held shower head that will allow you to sit while showering. · Get into a tub or shower by putting the weaker leg in first. Get out of a tub or shower with your strong side first.  · Repair loose toilet seats and consider installing a raised toilet seat to make getting on and off the toilet easier. · Keep your bathroom door unlocked while you are in the shower. Where can you learn more? Go to http://meena-juan r.info/. Enter 0476 [id number] in the search box to learn more about \"Preventing Falls: Care Instructions. \"  Current as of: 2018  Content Version: 11.8  © 0401-7415 [employer]. Care instructions adapted under license by [employer] (which disclaims liability or warranty for this information). If you have questions about a medical condition or this instruction, always ask your healthcare professional. [place] [address] any warranty or liability for your use of this information. [employer] Activation    Thank you for requesting access to [employer]. Please follow the instructions below to securely access and download your online medical record. [employer] allows you to send messages to your doctor, view your test results, renew your prescriptions, schedule appointments, and more. How Do I Sign Up? 1. In your internet browser, go to [email or web address]  2. Click on the First Time User? Click Here link in the Sign In box. You will be redirect to the New Member Sign Up page. 3. Enter your [employer] Access Code exactly as it appears below. You will not need to use this code after youve completed the sign-up process. If you do not sign up before the expiration date, you must request a new code. [employer] Access Code: [email or web address]  Expires: 2020  5:11 PM (This is the date your [employer] access code will )    4. Enter the last four digits of your Social Security Number (xxxx) and Date of Birth (mm/dd/yyyy) as indicated and click Submit.  You will be taken to the next sign-up page. 5. Create a [employer] ID. This will be your [employer] login ID and cannot be changed, so think of one that is secure and easy to remember. 6. Create a [employer] password. You can change your password at any time. 7. Enter your Password Reset Question and Answer. This can be used at a later time if you forget your password. 8. Enter your e-mail address. You will receive e-mail notification when new information is available in [address]. 9. Click Sign Up. You can now view and download portions of your medical record. 10. Click the Download Summary menu link to download a portable copy of your medical information. Additional Information    If you have questions, please visit the Frequently Asked Questions section of the [employer] website at [email or web address] Remember, [employer] is NOT to be used for urgent needs. For medical emergencies, dial 911. Complete all medications as prescribed. Follow-up with primary care doctor in 1 week. Return to the ED immediately for any new or worsening symptoms.

## 2019-12-08 NOTE — ED TRIAGE NOTES
Pt fell earlier and hit her forehead on the edge of a pond at their house. Has lac to forehead. No LOC. Pt has been having balance problems since having a stroke several years ago, but states it has been getting worse. Also c/o having intermittent pains in her head.  Pt states she has not been taking her blood thinner like she should for several months

## 2019-12-08 NOTE — ED PROVIDER NOTES
EMERGENCY DEPARTMENT HISTORY AND PHYSICAL EXAM    Date: 12/8/2019  Patient Name: Melissa Quinonez    History of Presenting Illness     Chief Complaint   Patient presents with    Fall    Laceration    Head Injury         History Provided By: patient     Chief Complaint: fall  Duration: just PTA  Timing:  acute  Location: forehead  Quality: aching  Severity: moderate  Modifying Factors: none   Associated Symptoms: laceration       Additional History (Context): Melissa Quinonez is a 70 y.o. female with PMH anemia, hypertension, high cholesterol, asthma, aneurysm, CKD, and renal cell carcinoma who presents with c/o head pain and a laceration to the forehead after a fall PTA. Pt reports tripping on a rug causing her to strike her head against a hard plastic pond that her pet turtle uses in her home. Pt denies LOC and N/V. Tetanus is not UTD. No other complaints reported at this time. PCP: Sudhir Black MD    Current Outpatient Medications   Medication Sig Dispense Refill    losartan (COZAAR) 100 mg tablet Take 1 Tab by mouth daily. 90 Tab 3    furosemide (LASIX) 20 mg tablet Take 20 mg by mouth daily.  acetaminophen-codeine (TYLENOL #3) 300-30 mg per tablet Take 1 Tab by mouth as needed (BID-TID prn chronic pain) for up to 30 days. Max Daily Amount: 3 Tabs. 90 Tab 2    amLODIPine (NORVASC) 5 mg tablet Take 5 mg by mouth daily.  LINZESS 145 mcg cap capsule Take 145 mcg by mouth daily.  aspirin delayed-release 81 mg tablet Take  by mouth daily.  atorvastatin (LIPITOR) 80 mg tablet Take 1 Tab by mouth daily. 90 Tab 3    Cholecalciferol, Vitamin D3, 50,000 unit cap Take 1 Cap by mouth every seven (7) days.  PROPYLENE GLYCOL/ (SYSTANE OP) Apply  to eye as needed.  albuterol (PROVENTIL HFA, VENTOLIN HFA) 90 mcg/actuation inhaler Take 2 Puffs by inhalation as needed.  cetirizine (ZYRTEC) 10 mg tablet Take 10 mg by mouth as needed.          Past History     Past Medical History:  Past Medical History:   Diagnosis Date    Anemia NEC     Aneurysm ([address]     left kidney    Arthritis     Asthma     Cardiac catheterization 02/23/2015    R dominant. LM patent. mLAD 20%. CX patent. mRCA 20%. LVEDP 8.  EF 60%. Mild AS. RA 3/1. PA 24. W 6/5.  CI 2.94.    Cardiac echocardiogram 07/27/2016    EF 60-65%. No WMA. Mild LVH. Normal LV diastolic fx. Mild LAE. Mod-severe AS (mean grad 34 mmHg). AS has worsened since study of 1/20/15. Mild MR.  Cardiac nuclear imaging test 02/10/2015    No evidence of ischemia or prior infarction. Hyperdynamic LV fx.  EF >70%. No RWMA. Nondiagnostic EKG on pharm stress test.  Low risk.  Carotid duplex 01/12/2016    Mild < 50% bilateral ICA stenosis.  Chest tightness     exertional, in the setting of widely patent coronary arteries, possibly related to elevated left ventricular end-diastolic pressure or possibly to small-vessel disease, improved on Cardizem CD in place of Verapamil    Chronic kidney disease, stage III (moderate) (HCC)     Essential hypertension     Glaucoma     Heart murmur     Hypercholesterolemia     Hypoglycemia     Raynaud's syndrome     Renal cell carcinoma (HCC)     Stage T1a [place] Grade 2 s/p right open partial nephrectomy 2/22/10      S/p nephrectomy 2/22/10    right    Scoliosis     Stroke [facility]) 2/2015    resid los of balance       Past Surgical History:  Past Surgical History:   Procedure Laterality Date    COLONOSCOPY N/A 10/10/2016    COLONOSCOPY performed [facility] Smart [facility] at [facility]    HX AORTIC VALVE REPLACEMENT Bilateral 03/2012018    not allowed to have MRI    HX AORTIC VALVE REPLACEMENT      HX CYST REMOVAL      on spine    HX GYN      partial hysterectomy 1988, on Premarin until a few years ago    HX HEART CATHETERIZATION  3/18/09    Low left-sided and right-sided filling pressures (likely related to overnight fasting). Normal resting pulmonary  arterial pressure. Normal systemic pressures. Borderline left ventricular end-diastolic pressure at rest. Mild incerease in pulmonary artery pressure and pulmonary capillary wedge pressure with exercise. Mild, hemodynamically nonsignificant epicardial coronary artery disease.  HX NEPHRECTOMY  2/22/10    s/p partial right nephrectomy for right kidney lower pole mass secondary to early stage cancer    HX OTHER SURGICAL      Ovary removal    HX TRABECULECTOMY      VASCULAR SURGERY PROCEDURE UNLIST  03/20/2018    artificial Heart Valve (Dr. Jyoti Mchugh with [facility])       Family History:  Family History   Problem Relation Age of Onset    Thyroid Disease Mother     COPD Mother     Cancer Father     Heart Disease Brother        Social History:  Social History     Tobacco Use    Smoking status: Never Smoker    Smokeless tobacco: Never Used   Substance Use Topics    Alcohol use: No     Alcohol/week: 0.0 standard drinks    Drug use: No       Allergies: Allergies   Allergen Reactions    Vicodin [Hydrocodone-Acetaminophen] Nausea and Vomiting         Review of Systems   Review of Systems   Constitutional: Negative. Negative for chills and fever. HENT: Negative. Negative for congestion, ear pain and rhinorrhea. Eyes: Negative. Negative for pain and redness. Respiratory: Negative. Negative for cough, shortness of breath, wheezing and stridor. Cardiovascular: Negative. Negative for chest pain and leg swelling. Gastrointestinal: Negative. Negative for abdominal pain, constipation, diarrhea, nausea and vomiting. Genitourinary: Negative. Negative for dysuria and frequency. Musculoskeletal: Negative. Negative for back pain and neck pain. Skin: Positive for wound. Negative for rash. Neurological: Negative. Negative for dizziness, seizures, syncope and headaches. All other systems reviewed and are negative.     All Other Systems Negative  Physical Exam     Vitals:    12/08/19 1705   BP: 165/81   Pulse: 100 Resp: 20   Temp: 98 °F (36.7 °C)   SpO2: 100%   Weight: 52.2 kg (115 lb)   Height: 5' 2\" (1.575 m)     Physical Exam  Vitals signs and nursing note reviewed. Constitutional:       General: She is not in acute distress. Appearance: She is well-developed. She is not diaphoretic. HENT:      Head: Normocephalic and atraumatic. Eyes:      General: No scleral icterus. Right eye: No discharge. Left eye: No discharge. Extraocular Movements: Extraocular movements intact. Conjunctiva/sclera: Conjunctivae normal.      Pupils: Pupils are equal, round, and reactive to light. Neck:      Musculoskeletal: Normal range of motion and neck supple. Comments: No midline point TTP noted to the posterior cervical spine. ROM of the spine intact without evidence of radiculopathy noted. Cardiovascular:      Rate and Rhythm: Normal rate and regular rhythm. Heart sounds: Normal heart sounds. No murmur. No friction rub. No gallop. Pulmonary:      Effort: Pulmonary effort is normal. No respiratory distress. Breath sounds: Normal breath sounds. No stridor. No wheezing or rales. Musculoskeletal: Normal range of motion. Skin:     General: Skin is warm and dry. Findings: No erythema or rash. Comments: Small 1.5 cm laceration to the L side of the forehead. Wound extends into the subcutaneous tissue. Bleeding controlled. No retained FB noted. Neurological:      Mental Status: She is alert and oriented to person, place, and time. Coordination: Coordination normal.      Comments: Gait is steady and patient exhibits no evidence of ataxia. Patient is able to ambulate without difficulty. No focal neurological deficit noted. No facial droop, slurred speech, or evidence of altered mentation noted on exam.       Psychiatric:         Behavior: Behavior normal.         Thought Content:  Thought content normal.                Diagnostic Study Results     Labs -   No results found for this or any previous visit (from the past 12 hour(s)). Radiologic Studies -   CT HEAD WO CONT   Final Result   IMPRESSION:      No acute intracranial findings. Left frontal scalp laceration noted. Probable remote lacunar infarction the left basal ganglion region.   -Mild periventricular low-attenuation, likely chronic microvascular ischemic   change. CT Results  (Last 48 hours)               12/08/19 1810  CT HEAD WO CONT Final result    Impression:  IMPRESSION:       No acute intracranial findings. Left frontal scalp laceration noted. Probable remote lacunar infarction the left basal ganglion region.   -Mild periventricular low-attenuation, likely chronic microvascular ischemic   change. Narrative:  EXAMINATION: CT head without contrast       INDICATION: Fall, head injury       COMPARISON: MRI 2/20/2015       TECHNIQUE: CT of the head performed without contrast, with multiplanar   reformations obtained. All CT scans at this facility are performed using dose   optimization technique as appropriate to a performed exam, to include automated   exposure control, adjustment of the mA and/or kV according to patient size   (including appropriate matching first site specific examinations), or use of   iterative reconstruction technique. FINDINGS:       No focal mass effect or shift of midline structures. Mild cerebral cortical   atrophy. Ventricles are normal in size and configuration. No acute intracranial   hemorrhage. Probable remote lacunar infarcts in the left basal ganglion region. Mild periventricular white matter low-attenuation. Calvarium intact. Paranasal sinuses and mastoid air cells appear well-aerated. Evidence of left frontal scalp laceration. CXR Results  (Last 48 hours)    None            Medical Decision Making   I am the first provider for this patient.     I reviewed the vital signs, available nursing notes, past medical history, past surgical history, family history and social history. Vital Signs-Reviewed the patient's vital signs. Records Reviewed: Mena Finney PA-C     Procedures:  Wound Repair  Date/Time: 12/8/2019 7:10 PM  Performed by: PAPreparation: skin prepped with Betadine  Time out: Immediately prior to the procedure a time out was called to verify the correct patient, procedure, equipment, staff and marking as appropriate. .  Location: forehead. Wound length:2.5 cm or less  Anesthesia: local infiltration    Anesthesia:  Local Anesthetic: lidocaine 1% without epinephrine  Anesthetic total: 2 mL  Foreign bodies: no foreign bodies  Irrigation solution: saline  Irrigation method: syringe  Debridement: none  Skin closure: 4-0 nylon  Number of sutures: 4  Technique: simple and interrupted  Approximation: close  Patient tolerance: Patient tolerated the procedure well with no immediate complications  My total time at bedside, performing this procedure was 1-15 minutes. Provider Notes (Medical Decision Making): Impression:  Fall, laceration     Tetanus updated    CT shows possible remote lacunar infarct without acute process noted. Pt made aware. Pt states she has been falling frequently but states this has been ongoing for several years. Will plan to given neurology follow-up. Sutures placed, pcp follow-up in 1 week for suture removal. Pt agrees Mena Finney PA-C     MED RECONCILIATION:  No current facility-administered medications for this encounter. Current Outpatient Medications   Medication Sig    losartan (COZAAR) 100 mg tablet Take 1 Tab by mouth daily.  furosemide (LASIX) 20 mg tablet Take 20 mg by mouth daily.  acetaminophen-codeine (TYLENOL #3) 300-30 mg per tablet Take 1 Tab by mouth as needed (BID-TID prn chronic pain) for up to 30 days. Max Daily Amount: 3 Tabs.  amLODIPine (NORVASC) 5 mg tablet Take 5 mg by mouth daily.  LINZESS 145 mcg cap capsule Take 145 mcg by mouth daily.     aspirin delayed-release 81 mg tablet Take  by mouth daily.  atorvastatin (LIPITOR) 80 mg tablet Take 1 Tab by mouth daily.  Cholecalciferol, Vitamin D3, 50,000 unit cap Take 1 Cap by mouth every seven (7) days.  PROPYLENE GLYCOL/ (SYSTANE OP) Apply  to eye as needed.  albuterol (PROVENTIL HFA, VENTOLIN HFA) 90 mcg/actuation inhaler Take 2 Puffs by inhalation as needed.  cetirizine (ZYRTEC) 10 mg tablet Take 10 mg by mouth as needed. Disposition:  D/c    DISCHARGE NOTE:   Patient is stable for discharge at this time. I have discussed all the findings from today's work up with the patient, including lab results and imaging. I have answered all questions. No new rx given. Rest and close follow-up with the PCP and neurology recommended this week. Return to the ED immediately for any new or worsening symptoms. 7:12 PM     Follow-up Information     Follow up With Specialties Details Why Contact Info    Kim Rizo MD Family Practice Schedule an appointment as soon as possible for a visit in 1 week For suture removal [id number] [facility]  [address] [place] [address]      Chaitanya Smith MD Neurology Schedule an appointment as soon as possible for a visit in 1 week  [address] [place] [id number] [facility] EMERGENCY DEPT Emergency Medicine  As needed, If symptoms worsen [address] [facility]  [phone]          Current Discharge Medication List            Diagnosis     Clinical Impression:   1. Fall, initial encounter    2.  Laceration of head without foreign body, unspecified part of head, initial encounter

## 2019-12-12 ENCOUNTER — OFFICE VISIT (OUTPATIENT)
Dept: NEUROLOGY | Age: 79
End: 2019-12-12

## 2019-12-12 VITALS
HEART RATE: 95 BPM | OXYGEN SATURATION: 99 % | HEIGHT: 62 IN | TEMPERATURE: 98.2 F | RESPIRATION RATE: 18 BRPM | SYSTOLIC BLOOD PRESSURE: 140 MMHG | WEIGHT: 122 LBS | DIASTOLIC BLOOD PRESSURE: 78 MMHG | BODY MASS INDEX: 22.45 KG/M2

## 2019-12-12 DIAGNOSIS — R26.9 GAIT DISORDER: Primary | ICD-10-CM

## 2019-12-12 NOTE — PROGRESS NOTES
Zev Stokes is a 70 y.o., right handed female, with an established history of hypertension, stroke several years ago, left with a gait disorder, who has had a recent fall. She's been stumbling after the stroke intermittently. She tried to use cane, but that caused tripping too, so she stopped using it. She hasn't injured herself until recently but has had several falls that were unprovoked. No lose of consciousness. No seizure activity. She has had some dizziness since the stroke back in . There's no extremity weakness. Social History; , lives with her . Doesn't drink, smoke or use illicit drugs. . Family History; mother  from COPD. Father  with asbestosis. Sibling with heart disease. Current Outpatient Medications   Medication Sig Dispense Refill    losartan (COZAAR) 100 mg tablet Take 1 Tab by mouth daily. 90 Tab 3    amLODIPine (NORVASC) 5 mg tablet Take 5 mg by mouth daily.  LINZESS 145 mcg cap capsule Take 145 mcg by mouth daily.  aspirin delayed-release 81 mg tablet Take  by mouth daily.  atorvastatin (LIPITOR) 80 mg tablet Take 1 Tab by mouth daily. 90 Tab 3    Cholecalciferol, Vitamin D3, 50,000 unit cap Take 1 Cap by mouth every seven (7) days.  PROPYLENE GLYCOL/ (SYSTANE OP) Apply  to eye as needed.  albuterol (PROVENTIL HFA, VENTOLIN HFA) 90 mcg/actuation inhaler Take 2 Puffs by inhalation as needed.  acetaminophen-codeine (TYLENOL #3) 300-30 mg per tablet Take 1 Tab by mouth as needed (BID-TID prn chronic pain) for up to 30 days. Max Daily Amount: 3 Tabs. 90 Tab 2    furosemide (LASIX) 20 mg tablet Take 20 mg by mouth daily.  cetirizine (ZYRTEC) 10 mg tablet Take 10 mg by mouth as needed. Past Medical History:   Diagnosis Date    Anemia NEC     Aneurysm ([address]     left kidney    Arthritis     Asthma     Cardiac catheterization 2015    R dominant. LM patent. mLAD 20%.   CX patent. mRCA 20%. LVEDP 8.  EF 60%. Mild AS. RA 3/1. PA 24. W 6/5.  CI 2.94.    Cardiac echocardiogram 07/27/2016    EF 60-65%. No WMA. Mild LVH. Normal LV diastolic fx. Mild LAE. Mod-severe AS (mean grad 34 mmHg). AS has worsened since study of 1/20/15. Mild MR.  Cardiac nuclear imaging test 02/10/2015    No evidence of ischemia or prior infarction. Hyperdynamic LV fx.  EF >70%. No RWMA. Nondiagnostic EKG on pharm stress test.  Low risk.  Carotid duplex 01/12/2016    Mild < 50% bilateral ICA stenosis.  Chest tightness     exertional, in the setting of widely patent coronary arteries, possibly related to elevated left ventricular end-diastolic pressure or possibly to small-vessel disease, improved on Cardizem CD in place of Verapamil    Chronic kidney disease, stage III (moderate) (HCC)     Essential hypertension     Glaucoma     Heart murmur     Hypercholesterolemia     Hypoglycemia     Raynaud's syndrome     Renal cell carcinoma (HCC)     Stage T1a Furhman Grade 2 s/p right open partial nephrectomy 2/22/10      S/p nephrectomy 2/22/10    right    Scoliosis     Stroke [facility]) 2/2015    resid los of balance       Past Surgical History:   Procedure Laterality Date    COLONOSCOPY N/A 10/10/2016    COLONOSCOPY performed [facility] Jayme Kim [facility] at [facility] ENDOSCOPY    HX AORTIC VALVE REPLACEMENT Bilateral 03/2012018    not allowed to have MRI    HX AORTIC VALVE REPLACEMENT      HX CYST REMOVAL      on spine    HX GYN      partial hysterectomy 1988, on Premarin until a few years ago    HX HEART CATHETERIZATION  3/18/09    Low left-sided and right-sided filling pressures (likely related to overnight fasting). Normal resting pulmonary  arterial pressure. Normal systemic pressures. Borderline left ventricular end-diastolic pressure at rest. Mild incerease in pulmonary artery pressure and pulmonary capillary wedge pressure with exercise.  Mild, hemodynamically nonsignificant epicardial coronary artery disease.     HX NEPHRECTOMY  2/22/10    s/p partial right nephrectomy for right kidney lower pole mass secondary to early stage cancer    HX OTHER SURGICAL      Ovary removal    HX TRABECULECTOMY      VASCULAR SURGERY PROCEDURE UNLIST  03/20/2018    artificial Heart Valve (Dr. Minal Dawson with [facility])       Allergies   Allergen Reactions    Vicodin [Hydrocodone-Acetaminophen] Nausea and Vomiting       Patient Active Problem List   Diagnosis Code    Asthma J45.909    Chronic kidney disease, stage III (moderate) (HCC) N18.3    Anemia D64.9    S/p right nephrectomy for cancer in 1/10 Z90.5    Dyslipidemia E78.5    Hypertension I10    Raynaud's phenomenon I73.00    Hemiparesis, left     Aortic stenosis I35.0    Chest pain R07.9    Renal artery aneurysm (HCC) I72.2    DDD (degenerative disc disease), lumbar M51.36    Lumbar facet arthropathy M47.816    HNP (herniated nucleus pulposus), lumbar M51.26    Trochanteric bursitis, right hip M70.61    Chronic pain G89.29    Therapeutic drug monitoring Z51.81    Muscle spasm of back M62.830    Chronic anticoagulation Z79.01         Review of Systems:   As above otherwise 11 point review of systems negative including;   Constitutional no fever or chills  Skin denies rash or itching  HENT  Denies tinnitus, hearing lose  Eyes denies diplopia vision lose  Respiratory denies shortness of breath  Cardiovascular denies chest pain, dyspnea on exertion  Gastrointestinal denies nausea, vomiting, diarrhea, constipation  Genitourinary denies incontinence  Musculoskeletal denies joint pain or swelling  Endocrine denies weight change  Hematology denies easy bruising or bleeding   Neurological as above in HPI      PHYSICAL EXAMINATION:      VITAL SIGNS:    Visit Vitals  /78 (BP 1 Location: Left arm, BP Patient Position: Sitting)   Pulse 95   Temp 98.2 °F (36.8 °C)   Resp 18   Ht 5' 2\" (1.575 m)   Wt 55.3 kg (122 lb)   SpO2 99%   BMI 22.31 kg/m² GENERAL: The patient is well developed, well nourished, and in no apparent distress. EXTREMITIES: No clubbing, cyanosis, or edema is identified. Pulses 2+ and symmetrical.  Muscle tone is normal.  HEAD:   Ear, nose, and throat appear to be without trauma. She has a sutured laceration of the left forehead, about 2 cm. The patient is normocephalic. NEUROLOGIC EXAMINATION    MENTAL STATUS: The patient is awake, alert, and oriented x 4. Fund of knowledge is adequate. Speech is fluent and memory appears to be intact, both long and short term. CRANIAL NERVES: II  Visual fields are full to confrontation. Funduscopic examination reveals flat disks bilaterally. Pupils are both 2 mm and briskly reactive to light and accommodation. III, IV, VI  Extraocular movements are intact and there is no nystagmus. V  Facial sensation is intact to pinprick and light touch. VII  Face is symmetrical.   VIII - Hearing is present. IX, X, [address] rises symmetrically. Gag is present. Tongue is in the midline. XI - Shoulder shrugging and head turning intact  MOTOR:  The patient is 5/5 in all four limbs without any drift. Fine finger movements are symmetrical.  Isolated motor group testing reveals no focal abnormalities. Tone is normal.  Sensory examination is intact to pinprick, light touch and position sense testing. Reflexes are 2+ and symmetrical. Plantars are down going. Cerebellar examination reveals no gross ataxia or dysmetria. Gait is unsteady and narrow based. Final result (Exam End: 12/8/2019 18:10) Provider Status: Open   Study Result     EXAMINATION: CT head without contrast     INDICATION: Fall, head injury     COMPARISON: MRI 2/20/2015     TECHNIQUE: CT of the head performed without contrast, with multiplanar  reformations obtained.  All CT scans at this facility are performed using dose  optimization technique as appropriate to a performed exam, to include automated  exposure control, adjustment of the mA and/or kV according to patient size  (including appropriate matching first site specific examinations), or use of  iterative reconstruction technique.     FINDINGS:     No focal mass effect or shift of midline structures. Mild cerebral cortical  atrophy. Ventricles are normal in size and configuration. No acute intracranial  hemorrhage. Probable remote lacunar infarcts in the left basal ganglion region. Mild periventricular white matter low-attenuation.     Calvarium intact. Paranasal sinuses and mastoid air cells appear well-aerated. Evidence of left frontal scalp laceration.     IMPRESSION  IMPRESSION:     No acute intracranial findings. Left frontal scalp laceration noted.     Probable remote lacunar infarction the left basal ganglion region.  -Mild periventricular low-attenuation, likely chronic microvascular ischemic  change. I have reviewed the above imagines myself. CBC:   Lab Results   Component Value Date/Time    WBC 5.3 02/21/2015 02:02 AM    RBC 3.45 (L) 02/21/2015 02:02 AM    HGB 10.8 (L) 02/21/2015 02:02 AM    HCT 32.0 (L) 02/21/2015 02:02 AM    PLATELET 416 99/74/2899 02:02 AM     BMP:   Lab Results   Component Value Date/Time    Glucose 85 02/23/2015 01:36 AM    Sodium 139 02/23/2015 01:36 AM    Potassium 3.5 02/23/2015 01:36 AM    Chloride 105 02/23/2015 01:36 AM    CO2 25 02/23/2015 01:36 AM    BUN 13 02/23/2015 01:36 AM    Creatinine 0.87 02/23/2015 01:36 AM    Calcium 8.8 02/23/2015 01:36 AM     CMP:   Lab Results   Component Value Date/Time    Glucose 85 02/23/2015 01:36 AM    Sodium 139 02/23/2015 01:36 AM    Potassium 3.5 02/23/2015 01:36 AM    Chloride 105 02/23/2015 01:36 AM    CO2 25 02/23/2015 01:36 AM    BUN 13 02/23/2015 01:36 AM    Creatinine 0.87 02/23/2015 01:36 AM    Calcium 8.8 02/23/2015 01:36 AM    Anion gap 9 02/23/2015 01:36 AM    BUN/Creatinine ratio 15 02/23/2015 01:36 AM    Alk.  phosphatase 66 02/05/2015 12:00 AM    Protein, total 6.6 02/05/2015 12:00 AM    Albumin 4.5 02/05/2015 12:00 AM    Globulin 2.6 08/14/2010 08:17 AM    A-G Ratio 2.1 02/05/2015 12:00 AM     Coagulation:   Lab Results   Component Value Date/Time    Prothrombin time 12.5 02/19/2015 09:18 PM    INR 0.9 02/19/2015 09:18 PM    aPTT 31.9 02/19/2015 09:18 PM     Cardiac markers:   Lab Results   Component Value Date/Time    CK 60 02/19/2015 09:18 PM    CK-MB Index CANNOT BE CALCULATED 02/19/2015 09:18 PM          Impression: Gait disorder and lightheadedness in this patient who has risk factors including prior strokes and significant vascular disease. I suspect that this unsteadiness of her gait, which is been going on for several years, is due to progressive cerebrovascular disease. She had an MRI scan back in 2015 that showed significant subcortical vascular disease as well as a small old lacunar stroke. Her gait has been worsening and she is been having falls probably as a consequence of this unsteadiness. Additionally she may have some autonomic instability causing her some lightheadedness when she rises from the sitting position too briskly. Plan: I encouraged her to use her cane. I am sending her to physical therapy in order to get her safe for ambulation. She is got to be able to walk since her  has Alzheimer's disease and she is his primary caregiver. They do try to get an MRI scan of the brain however, I suspect the eval that she has will not be MRI compatible. If that is the case then I may repeat her CAT scan in the future. I will see her back in about 6 weeks. Thank you for allowing me to evaluate this patient. PLEASE NOTE:   This document has been produced using voice recognition software. Unrecognized errors in transcription may be present.

## 2019-12-12 NOTE — PROGRESS NOTES
Chief Complaint   Patient presents with    Neurologic Problem    Fall    New Patient       Nathaniel Vazquez presents today for   Chief Complaint   Patient presents with    Neurologic Problem    Fall    New Patient       Is someone accompanying this pt? no    Is the patient using any DME equipment during [address]? no    Depression Screening:  3 most recent PHQ Screens 10/10/2019   Little interest or pleasure in doing things Not at all   Feeling down, depressed, irritable, or hopeless Not at all   Total Score PHQ 2 0       Learning Assessment:  Learning Assessment 7/26/2016   PRIMARY LEARNER Patient   HIGHEST LEVEL OF EDUCATION - PRIMARY LEARNER  -   BARRIERS PRIMARY LEARNER -   CO-LEARNER CAREGIVER -   PRIMARY LANGUAGE ENGLISH   LEARNER PREFERENCE PRIMARY DEMONSTRATION     -   ANSWERED BY Patient   RELATIONSHIP SELF       Abuse Screening:  No flowsheet data found. Fall Risk  Fall Risk Assessment, last 12 mths 12/12/2019   Able to walk? Yes   Fall in past 12 months? Yes   Fall with injury? Yes   Number of falls in past 12 months 2   Fall Risk Score 3         Coordination of Care:  1. Have you been to the ER, urgent care clinic since your last visit? Hospitalized since your last visit? Yes, HV    2. Have you seen or consulted any other health care providers outside of the [address] [place] [address] since your last visit? Include any pap smears or colon screening.  no

## 2019-12-12 NOTE — LETTER
12/12/2019 3:41 PM 
 
Patient: Zev Stokes YOB: 1940 Date of Visit: 12/12/2019 Dear Kana Mckenzie, [address] [facility] [id number] [place] [address] VIA In Basket Trung Santoyo MD 
[facility], [place] [address] VIA Facsimile: [phone] 
 : Thank you for referring Ms. Timoteo Jackson to me for evaluation/treatment. Below are the relevant portions of my assessment and plan of care. If you have questions, please do not hesitate to call me. I look forward to following Ms. Fili Everett along with you.  
 
 
 
Sincerely, 
 
 
Mic Morrow MD

## 2019-12-13 ENCOUNTER — TELEPHONE (OUTPATIENT)
Dept: NEUROLOGY | Age: 79
End: 2019-12-13

## 2019-12-13 NOTE — TELEPHONE ENCOUNTER
Patient called because provider advised physical therapy but patient does not want to travel to [facility] [place]. Asked if she can go to a Dr. Nicole Tierney patient didn't have phone number or any information for this doctor. Patient did say that the name on the [facility]

## 2019-12-19 ENCOUNTER — HOSPITAL ENCOUNTER (OUTPATIENT)
Dept: PHYSICAL THERAPY | Age: 79
Discharge: HOME | End: 2019-12-19
Payer: MEDICARE

## 2019-12-19 PROCEDURE — 97162 PT EVAL MOD COMPLEX 30 MIN: CPT

## 2019-12-19 PROCEDURE — 97112 NEUROMUSCULAR REEDUCATION: CPT

## 2019-12-19 NOTE — PROGRESS NOTES
[facility]  [address]  [place], [address]  [phone] fax    Plan of Care/ Statement of Necessity for Physical Therapy Services    Patient name: Caleb Johnston Start of Care: 2019   Referral source: Bartolome Alan MD : 1940    Medical Diagnosis: Unspecified abnormalities of gait and mobility [R26.9]  Payor: [place] MEDICARE / Plan: [place] MEDICARE PART A & B / Product Type: Medicare /  Onset Date:3 weeks ago    Treatment Diagnosis: difficulty walking   Prior Hospitalization: see medical history Provider#: [id number]   Medications: Verified on Patient summary List    Comorbidities: CVA, aortic valve replacement, cerebral vascular disease, allergies, asthma, CA, previous accidents, visual impairment   Prior Level of Function: (I) with gait without falls, 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 The Plan of Care and following information is based on the information from the initial evaluation. Assessment/ key information: Pt presents to PT with recent fall with head injury due to a decline in balance over the past 3 weeks. Imaging is normal and vestibular exam is WNL due to age. Pt was assessed for orthostatic hypotension due to reports of decreased balance when getting up but exam was WNL. Pt does demonstrate periodic but random festination, trunk ataxia and feelings of being \"unsteady\"  DGI score was a 15/24 indicating increased fall risk. Continue PT to address the above findings to reduce fall risk. Evaluation Complexity History MEDIUM  Complexity : 1-2 comorbidities / personal factors will impact the outcome/ POC ; Examination MEDIUM Complexity : 3 Standardized tests and measures addressing body structure, function, activity limitation and / or participation in recreation  ;Presentation MEDIUM Complexity : Evolving with changing characteristics  ; Clinical Decision Making MEDIUM Complexity : FOTO score of 26-74  Overall Complexity Rating: MEDIUM  Problem List: pain affecting function, decrease ROM, decrease strength, impaired gait/ balance, decrease ADL/ functional abilitiies, decrease activity tolerance and decrease transfer abilities   Treatment Plan may include any combination of the following: Therapeutic exercise, Therapeutic activities, Neuromuscular re-education, Physical agent/modality, Gait/balance training, Manual therapy and Patient education  Patient / Family readiness to learn indicated by: asking questions, trying to perform skills and interest  Persons(s) to be included in education: patient (P)  Barriers to Learning/Limitations: None  Patient Goal (s): To improve my balance so I can safely take care of my   Patient Self Reported Health Status: fair  Rehabilitation Potential: good    Short Term Goals: To be accomplished in 2 weeks:  1. Pt will be compliant with HEP 2x/day to reduce fall risk  EVAL: HEP established  2. Pt will repeat sit <> stand 10 times without feeling unsteady or losing balance to increase safety with ADLs  EVAL: unsteady every 2-3 reps  Long Term Goals: To be accomplished in 4 weeks:  1. Pt will improve FOTO to 58 to reduce fall risk  Eval: FOTO was 48  2. Pt will be able to hold rhomberg on foam with EO without excessive postural sway to reduce fall risk  EVAL: significant postural sway  3. Pt will be able to walk several blocks without difficulty to increase ease of care giving for spouse. EVAL: little difficulty  Frequency / Duration: Patient to be seen 2 times per week for 4 weeks.     Patient/ Caregiver education and instruction: Diagnosis, prognosis, self care, activity modification and exercises   [x]  Plan of care has been reviewed with PTA    Certification Period: 12/19/2019 - 1/18/2020  Isabella Baron, PT 12/19/2019 3:35 PM    ________________________________________________________________________    I certify that the above Therapy Services are being furnished while the patient is under my care. I agree with the treatment plan and certify that this therapy is necessary.     [de-identified] Signature:____________________  Date:____________Time: _________    Please sign and return to [facility] [address]  [place], [facility] [address]  [phone] fax

## 2019-12-19 NOTE — PROGRESS NOTES
PT DAILY TREATMENT NOTE 10-18    Patient Name: Adalberto Galdamez  Date:2019  : 1940  [x]  Patient  Verified  Payor: [place] MEDICARE / Plan: VA MEDICARE PART A & B / Product Type: Medicare /    In time:252  Out time:330  Total Treatment Time (min): 38  Visit #: 1 of 8    Medicare/BCBS Only   Total Timed Codes (min):  23 1:1 Treatment Time:  38       Treatment Area: Unspecified abnormalities of gait and mobility [R26.9]    SUBJECTIVE  Pain Level (0-10 scale): 0  Any medication changes, allergies to medications, adverse drug reactions, diagnosis change, or new procedure performed?: [x] No    [] Yes (see summary sheet for update)  Subjective functional status/changes:   [] No changes reported  Increased falls and unsteady gait for 3 weeks    OBJECTIVE    23 min Neuromuscular Re-education:  [x]  See flow sheet :   Rationale: improve coordination, improve balance and increase proprioception  to improve the patients ability to reduce risk of falls and injury             With   [] TE   [] TA   [] neuro   [] other: Patient Education: [x] Review HEP    [] Progressed/Changed HEP based on:   [] positioning   [] body mechanics   [] transfers   [] heat/ice application    [] other:      Other Objective/Functional Measures: refer to eval     Pain Level (0-10 scale) post treatment: 0    ASSESSMENT/Changes in Function: DGI indicates increased risk of falls. Proceed with balance treatment    Patient will continue to benefit from skilled PT services to modify and progress therapeutic interventions, address functional mobility deficits, address ROM deficits, address strength deficits, analyze and address soft tissue restrictions, analyze and cue movement patterns, analyze and modify body mechanics/ergonomics and assess and modify postural abnormalities to attain remaining goals. []  See Plan of Care  []  See progress note/recertification  []  See Discharge Summary         Progress towards goals:  Short Term Goals:  To be accomplished in 2 weeks:  1. Pt will be compliant with HEP 2x/day to reduce fall risk  EVAL: HEP established  2. Pt will repeat sit <> stand 10 times without feeling unsteady or losing balance to increase safety with ADLs  EVAL: unsteady every 2-3 reps  Long Term Goals: To be accomplished in 4 weeks:  1. Pt will improve FOTO to 58 to reduce fall risk  Eval: FOTO was 48  2. Pt will be able to hold rhomberg on foam with EO without excessive postural sway to reduce fall risk  EVAL: significant postural sway  3. Pt will be able to walk several blocks without difficulty to increase ease of care giving for spouse.   EVAL: little difficulty    PLAN  []  Upgrade activities as tolerated     [x]  Continue plan of care  []  Update interventions per flow sheet       []  Discharge due to:_  []  Other:_      Darin Hairston, PT 12/19/2019  3:44 PM    Future Appointments   Date Time Provider Bridget Richardson   12/20/2019 11:00 AM Monae Prasad, PTA [facility]   12/27/2019 10:30 AM Donel Lamp, PTA [facility]   12/31/2019 11:00 AM Tyrone Scale, PT [facility]   1/2/2020 11:00 AM Tyrone Scale, PT [facility]   1/8/2020 12:30 PM Tryone Scale, PT [facility]   1/9/2020  8:00 AM MD Dhaval Roberson   1/10/2020 11:00 AM Tyrone Scale, PT [facility]   1/14/2020 11:00 AM Tyrone Scale, PT [facility]   1/16/2020 11:30 AM Tyrone Scale, PT [facility]   1/23/2020 10:30 AM Sebastián Jasmine MD [address]   2/12/2020  8:40 AM Baron Finnegan  [facility]

## 2019-12-20 ENCOUNTER — HOSPITAL ENCOUNTER (OUTPATIENT)
Dept: PHYSICAL THERAPY | Age: 79
Discharge: HOME | End: 2019-12-20
Payer: MEDICARE

## 2019-12-20 PROCEDURE — 97116 GAIT TRAINING THERAPY: CPT

## 2019-12-20 PROCEDURE — 97112 NEUROMUSCULAR REEDUCATION: CPT

## 2019-12-20 PROCEDURE — 97110 THERAPEUTIC EXERCISES: CPT

## 2019-12-20 NOTE — PROGRESS NOTES
PT DAILY TREATMENT NOTE 10-18    Patient Name: Keenan Cardona  Date:2019  : 1940  [x]  Patient  Verified  Payor: [place] MED[place]ARE / Plan: [place] MEDICARE PART A & B / Product Type: Medicare /    In time:11:07  Out time:11:32  Total Treatment Time (min): 25  Visit #: 2 of 8    Medicare/BCBS Only   Total Timed Codes (min):  25 1:1 Treatment Time:  25       Treatment Area: Unspecified abnormalities of gait and mobility [R26.9]    SUBJECTIVE  Pain Level (0-10 scale): 0/10  Any medication changes, allergies to medications, adverse drug reactions, diagnosis change, or new procedure performed?: [x] No    [] Yes (see summary sheet for update)  Subjective functional status/changes:   [] No changes reported  Pt reports no pain but states she often loses her balance. OBJECTIVE     15 min Neuromuscular Re-education:  [x]  See flow sheet :   Rationale: increase ROM and increase strength  to improve the patients ability to tolerate ADLs. 8 min Gait Training:  Dynamic gait activities    Rationale: to improve gait efficiency and reduce fall risk          With   [] TE   [] TA   [] neuro   [] other: Patient Education: [x] Review HEP    [] Progressed/Changed HEP based on:   [] positioning   [] body mechanics   [] transfers   [] heat/ice application    [] other:      Other Objective/Functional Measures: initiated exercises as per flow sheet. Pain Level (0-10 scale) post treatment: 0/10    ASSESSMENT/Changes in Function: Pt has frequent LOB with balance and gait activities. Patient will continue to benefit from skilled PT services to modify and progress therapeutic interventions, address functional mobility deficits, address ROM deficits, address strength deficits, analyze and address soft tissue restrictions, analyze and cue movement patterns and analyze and modify body mechanics/ergonomics to attain remaining goals.      []  See Plan of Care  []  See progress note/recertification  []  See Discharge Summary Progress towards goals / Updated goals:  Short Term Goals: To be accomplished in 2 weeks:  1.  Pt will be compliant with HEP 2x/day to reduce fall risk  EVAL: HEP established  2.  Pt will repeat sit <> stand 10 times without feeling unsteady or losing balance to increase safety with ADLs  EVAL: unsteady every 2-3 reps  Long Term Goals: To be accomplished in 4 weeks:  1. Pt will improve FOTO to 58 to reduce fall risk  Eval: FOTO was 48  2.  Pt will be able to hold rhomberg on foam with EO without excessive postural sway to reduce fall risk  EVAL: significant postural sway  3.  Pt will be able to walk several blocks without difficulty to increase ease of care giving for spouse.   EVAL: little difficulty       PLAN  []  Upgrade activities as tolerated     [x]  Continue plan of care  []  Update interventions per flow sheet       []  Discharge due to:_  []  Other:_      Ana Welch PTA 12/20/2019  11:39 AM    Future Appointments   Date Time Provider Bridget Richardson   12/27/2019 10:30 AM Mary Kate aLdd PTA [facility]   12/31/2019 11:00 AM Kayley Daunt, PT [facility]   1/2/2020 11:00 AM Kayley Daunt, PT [facility]   1/8/2020 12:30 PM Kayley Daunt, PT [facility]   1/9/2020  8:00 AM MD Dhaval Johnson   1/10/2020 11:00 AM Kayley Daunt, PT [facility]   1/14/2020 11:00 AM Kayley Daunt, PT [facility]   1/16/2020 11:30 AM Kayley Daunt, PT [facility]   1/23/2020 10:30 AM Piero Salcedo MD [address]   2/12/2020  8:40 AM Flores Sahni DO [address] [place] [address]

## 2019-12-23 DIAGNOSIS — M47.816 LUMBAR FACET ARTHROPATHY: ICD-10-CM

## 2019-12-23 DIAGNOSIS — G89.29 OTHER CHRONIC PAIN: ICD-10-CM

## 2019-12-23 DIAGNOSIS — Z79.891 USE OF OPIATES FOR THERAPEUTIC PURPOSES: ICD-10-CM

## 2019-12-23 RX ORDER — ACETAMINOPHEN AND CODEINE PHOSPHATE 300; 30 MG/1; MG/1
1 TABLET ORAL AS NEEDED
Qty: 90 TAB | Refills: 2 | Status: CANCELLED | OUTPATIENT
Start: 2019-12-23 | End: 2020-01-22

## 2019-12-23 NOTE — TELEPHONE ENCOUNTER
UDS done 4/4/19  CSA signed 8/14/18    Last Visit: 10/10/19 with MD Shelia Rizo  Next Appointment: 1/9/20 with MD Shelia Rizo  Previous Refill Encounter(s): 10/17/19 #90 with 2 refills    Requested Prescriptions     Pending Prescriptions Disp Refills    acetaminophen-codeine (TYLENOL #3) 300-30 mg per tablet 90 Tab 2     Sig: Take 1 Tab by mouth as needed (BID-TID prn chronic pain) for up to 30 days. Max Daily Amount: 3 Tabs.

## 2019-12-23 NOTE — TELEPHONE ENCOUNTER
Returned call to patient, verified Name/, informed patient, she would need to be seen prior to refill. Per patient, she will just wait until her appointment on 2020 @ 0800, for further refills. No further action required at this time.

## 2019-12-27 ENCOUNTER — HOSPITAL ENCOUNTER (OUTPATIENT)
Dept: PHYSICAL THERAPY | Age: 79
Discharge: HOME | End: 2019-12-27
Payer: MEDICARE

## 2019-12-27 PROCEDURE — 97112 NEUROMUSCULAR REEDUCATION: CPT

## 2019-12-27 PROCEDURE — 97110 THERAPEUTIC EXERCISES: CPT

## 2019-12-27 NOTE — PROGRESS NOTES
PT DAILY TREATMENT NOTE 10-18    Patient Name: Maribeth Wall  Date:2019  : 1940  [x]  Patient  Verified  Payor: [place] MEDICARE / Plan: [place] MEDICARE PART A & B / Product Type: Medicare /    In time:1030  Out time:1115  Total Treatment Time (min): 45  Visit #: 3 of 8    Medicare/BCBS Only   Total Timed Codes (min):  45 1:1 Treatment Time:  30       Treatment Area: Unspecified abnormalities of gait and mobility [R26.9]    SUBJECTIVE  Pain Level (0-10 scale): 0  Any medication changes, allergies to medications, adverse drug reactions, diagnosis change, or new procedure performed?: [x] No    [] Yes (see summary sheet for update)  Subjective functional status/changes:   [] No changes reported  Patient denied pain upon arrival    OBJECTIVE      20 min Therapeutic Exercise:  [x] See flow sheet :   Rationale: increase ROM and increase strength to improve the patients ability to perform ADLs         25 min Neuromuscular Re-education:  [x]  See flow sheet : balance and dyn gait   Rationale: improve coordination, improve balance and increase proprioception  to improve the patients ability to perform ADLs          With   [] TE   [] TA   [] neuro   [] other: Patient Education: [x] Review HEP    [] Progressed/Changed HEP based on:   [] positioning   [] body mechanics   [] transfers   [] heat/ice application    [] other:      Other Objective/Functional Measures:   Dyn gait: high knees marching, tandem, lateral, head turns and nods 2 x 60ft     Pain Level (0-10 scale) post treatment: 0    ASSESSMENT/Changes in Function: Patient tolerated progressed dynamic gait well but continues to require CGA and verbal cues for step length and width periodically.     Patient will continue to benefit from skilled PT services to modify and progress therapeutic interventions, address functional mobility deficits, address ROM deficits, address strength deficits, analyze and address soft tissue restrictions and analyze and cue movement patterns to attain remaining goals. [x]  See Plan of Care  []  See progress note/recertification  []  See Discharge Summary         Progress towards goals / Updated goals:  Short Term Goals: To be accomplished in 2 weeks:  1.  Pt will be compliant with HEP 2x/day to reduce fall risk  EVAL: HEP established  2.  Pt will repeat sit <> stand 10 times without feeling unsteady or losing balance to increase safety with ADLs  EVAL: unsteady every 2-3 reps  Long Term Goals: To be accomplished in 4 weeks:  1. Pt will improve FOTO to 58 to reduce fall risk  Eval: FOTO was 48  2.  Pt will be able to hold rhomberg on foam with EO without excessive postural sway to reduce fall risk  EVAL: significant postural sway  3.  Pt will be able to walk several blocks without difficulty to increase ease of care giving for spouse.   EVAL: little difficulty    PLAN  []  Upgrade activities as tolerated     [x]  Continue plan of care  []  Update interventions per flow sheet       []  Discharge due to:_  []  Other:_      Thad Sacks, ANNELISE 12/27/2019  12:32 PM    Future Appointments   Date Time Provider Bridget Richardson   12/31/2019 11:00 AM Nilsa Adams, PT [facility]   1/2/2020 11:00 AM Nilsa Adams, PT [facility]   1/8/2020 12:30 PM Nilsa Aadms, PT [facility]   1/9/2020  8:00 AM MD Dhaval Leach   1/10/2020 11:00 AM Nilsa Adams, PT [facility]   1/14/2020 11:00 AM Nilsa Adams, PT [facility]   1/16/2020 11:30 AM Nilsa Adams, PT [facility]   1/23/2020 10:30 AM Mini Christiansen MD [address]   2/12/2020  8:40 AM Cedric Garibay DO [address] [place] [address]

## 2019-12-31 ENCOUNTER — HOSPITAL ENCOUNTER (OUTPATIENT)
Dept: PHYSICAL THERAPY | Age: 79
Discharge: HOME | End: 2019-12-31
Payer: MEDICARE

## 2019-12-31 PROCEDURE — 97112 NEUROMUSCULAR REEDUCATION: CPT

## 2019-12-31 PROCEDURE — 97535 SELF CARE MNGMENT TRAINING: CPT

## 2019-12-31 NOTE — PROGRESS NOTES
PT DAILY TREATMENT NOTE 10-18    Patient Name: Citlali García  Date:2019  : 1940  [x]  Patient  Verified  Payor: VA MEDICARE / Plan: [place] MEDICARE PART A & B / Product Type: Medicare /    In time:11:00  Out time:11:42  Total Treatment Time (min): 42  Visit #: 4 of 8    Medicare/BCBS Only   Total Timed Codes (min):  42 1:1 Treatment Time:  37       Treatment Area: Unspecified abnormalities of gait and mobility [R26.9]    SUBJECTIVE  Pain Level (0-10 scale): 0/10  Any medication changes, allergies to medications, adverse drug reactions, diagnosis change, or new procedure performed?: [x] No    [] Yes (see summary sheet for update)  Subjective functional status/changes:   [] No changes reported  Haven't fallen since started PT. Trip over cane so don't use AD. OBJECTIVE     5 min Therapeutic Exercise:  [x] See flow sheet : To prepare body for activities. 10 min Self care/home mgmt: sit --> stand via sit up (pt's normal pattern): LOB once standing and head \"feels weird\". Sit --> stand via sidely: same as above. Pt education to take time in sitting to let sx's resolve prior to standing to minimize sx's and prevent falls. Pt resistant due to having \"too much to do\". Pt reports that amount of light and time of day does not affect her balance. Sometimes, she says her head feels funny when she is in standing as well; pt education she may want to speak with cardiologist or ENT. Rationale: decrease fall risk      22 min Neuromuscular Re-education:  [x]  See flow sheet :  Added perturbations to 1) stance and 2) walking ante and retro. Also added hand to hand ball exchange to 2). No LOB. Pt then noted she tends to have LOB when turning. Added stepping patterns involving 180 deg turns and quarter turns. No LOB. Sit <--> stand to improve control sitting motion without plopping.    Rationale: improve coordination, improve balance and increase proprioception  to improve the patients ability to perform ADLs        With   [] TE   [] TA   [] neuro   [] other: Patient Education: [x] Review HEP    [] Progressed/Changed HEP based on:   [] positioning   [] body mechanics   [] transfers   [] heat/ice application    [] other:      Other Objective/Functional Measures: cues to go slow to increase control over movements. Pain Level (0-10 scale) post treatment: 0/10    ASSESSMENT/Changes in Function: Pt reports she's only off balance when getting up out of bed. Sometimes feels weird in head and says she does have sinus issues. Pt was educated to take time sitting prior to standing up from bed but consistently says she has too much to do; she did not appear to be open to lifestyle changes at this time. Asked pt to make a list of any other times she has LOB and bring with her next visit to help determine if she is still a candidate for skilled PT considering today she presented with advanced motor programming for her age. D/C may be appropriate. Patient will continue to benefit from skilled PT services to modify and progress therapeutic interventions, address functional mobility deficits, address ROM deficits, address strength deficits, analyze and address soft tissue restrictions and analyze and cue movement patterns to attain remaining goals. [x]? See Plan of Care  []? See progress note/recertification  []? See Discharge Summary         Progress towards goals / Updated goals:  Short Term Goals: To be accomplished in 2 weeks:  1.  Pt will be compliant with HEP 2x/day to reduce fall risk  EVAL: HEP established  2.  Pt will repeat sit <> stand 10 times without feeling unsteady or losing balance to increase safety with ADLs  Current: met 12/31/19  EVAL: unsteady every 2-3 reps  Long Term Goals: To be accomplished in 4 weeks:  1.  Pt will improve FOTO to 58 to reduce fall risk  Eval: FOTO was 48  2.  Pt will be able to hold rhomberg on foam with EO without excessive postural sway to reduce fall risk  EVAL: significant postural sway  Current: met tandem with head and UE challenges with min sway 12/31/19  3.  Pt will be able to walk several blocks without difficulty to increase ease of care giving for spouse. EVAL: little difficulty   Current: pt had no LOB with advanced challenges that included perturbations and UE activity and turns 12/31/19     PLAN  []? Upgrade activities as tolerated     [x]? Continue plan of care  []? Update interventions per flow sheet       []? Discharge due to:_  [x]?   Other:_consider D/C if pt has no other times she is off balance than what's mentioned above    Aldair Arrieta, PT 12/31/2019  11:05 AM    Future Appointments   Date Time Provider Bridget Richardson   1/2/2020 11:00 AM Akil Po, PT [facility]   1/8/2020 12:30 PM Akil Po, PT [facility]   1/9/2020  8:00 AM MD Dhaval Jefferson Ray   1/10/2020 11:00 AM Akil Po, PT [facility]   1/14/2020 11:00 AM Akil Po, PT [facility]   1/16/2020 11:30 AM Akil Po, PT [facility]   1/23/2020 10:30 AM Darline Everett MD [address]   2/12/2020  8:40 AM Sravanthi Ambrosio DO [address] [place] [address]

## 2020-01-02 ENCOUNTER — HOSPITAL ENCOUNTER (OUTPATIENT)
Dept: PHYSICAL THERAPY | Age: 80
Discharge: HOME | End: 2020-01-02
Payer: MEDICARE

## 2020-01-02 PROCEDURE — 97112 NEUROMUSCULAR REEDUCATION: CPT
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
## 2020-01-08 ENCOUNTER — APPOINTMENT (OUTPATIENT)
Dept: PHYSICAL THERAPY | Age: 80
End: 2020-01-08
Payer: MEDICARE

## 2020-01-08 NOTE — PROGRESS NOTES
[facility]  [address] [place] [address], [facility] [address]  Phone: [phone]  Fax: [phone]    Pt's YOB: 1940    ASSESSMENT   Diagnoses and all orders for this visit:    1. Other chronic pain  -     acetaminophen-codeine (TYLENOL #3) 300-30 mg per tablet; Take 1 Tab by mouth as needed (BID-TID prn chronic pain) for up to 30 days. Max Daily Amount: 3 Tabs. -     DRUG SCREEN UR - W/ CONFIRM; Future    2. Use of opiates for therapeutic purposes  -     acetaminophen-codeine (TYLENOL #3) 300-30 mg per tablet; Take 1 Tab by mouth as needed (BID-TID prn chronic pain) for up to 30 days. Max Daily Amount: 3 Tabs. -     DRUG SCREEN UR - W/ CONFIRM; Future    3. Lumbar facet arthropathy  -     acetaminophen-codeine (TYLENOL #3) 300-30 mg per tablet; Take 1 Tab by mouth as needed (BID-TID prn chronic pain) for up to 30 days. Max Daily Amount: 3 Tabs. 4. Balance problem    5. Muscle spasm    6. History of stroke    7. Chronic kidney disease, stage III (moderate) ([facility])    8. S/p right nephrectomy for cancer in 1/10         IMPRESSION AND PLAN:  Sumi Morris is a 70 y.o. female with history of chronic lumbar pain. Pt reports increased pain with colder weather. She fell on 12/08/19. Pt has been prescribed Tylenol #3 and takes 1-3 tabs daily as her pain warrants with benefit. 1) Pt was given information on lumbar arthritis exercises. 2) She received a refill of Tylenol #3 1 tab BID-TID prn chronic pain. 3) Pt is not a candidate for NSAID's due to renal disease and partial nephrectomy for cancer. 4) Ms. Erma Barone has a reminder for a \"due or due soon\" health maintenance. I have asked that she contact her primary care provider, Tiffany Monte MD, for follow-up on this health maintenance. 5)  demonstrated consistency with prescribing. 6) Last UDS from 04/04/19 was consistent.   7) Pt counseled to be careful when changing positions and turning; therapy discussed but will defer for now  Follow-up and Dispositions    · Return in about 3 months (around 4/9/2020) for Medication follow up. HISTORY OF PRESENT ILLNESS:  Verito Loomis is a 70 y.o. female with history of chronic lumbar pain and presents to the office today for follow up. Pt reports increased pain with colder weather. She admits to balance issues since a stroke years ago. Pt has a turtle since the early 1980's and when she went to feed her, she tripped on her rug, fell forward, and hit the left side of her forehead on the edge of the pond. She went to the ER on 12/08/19 after her fall and had stitches. Pt has been prescribed Tylenol #3 and takes 1-3 tabs daily as her pain warrants with benefit. She has had to use the medication more frequently with the colder weather and recent injury. Her MME is 9-13.5 and she keeps her medication secure. The medication allows her to be more functional around her home and perform daily household activities. She decreases her medication if her pain is improved and uses it only as necessary. Pt at this time desires to continue with current care. She has a pet turtle named Marija and once had a pet skunk named Anjum. Pain Scale: 5/10    PCP: Andrew Cohn MD     Past Medical History:   Diagnosis Date    Anemia NEC     Aneurysm ([address]     left kidney    Arthritis     Asthma     Cardiac catheterization 02/23/2015    R dominant. LM patent. mLAD 20%. CX patent. mRCA 20%. LVEDP 8.  EF 60%. Mild AS. RA 3/1. PA 24. W 6/5.  CI 2.94.    Cardiac echocardiogram 07/27/2016    EF 60-65%. No WMA. Mild LVH. Normal LV diastolic fx. Mild LAE. Mod-severe AS (mean grad 34 mmHg). AS has worsened since study of 1/20/15. Mild MR.  Cardiac nuclear imaging test 02/10/2015    No evidence of ischemia or prior infarction. Hyperdynamic LV fx.  EF >70%. No RWMA. Nondiagnostic EKG on pharm stress test.  Low risk.     Carotid duplex 01/12/2016 Mild < 50% bilateral ICA stenosis.       Chest tightness     exertional, in the setting of widely patent coronary arteries, possibly related to elevated left ventricular end-diastolic pressure or possibly to small-vessel disease, improved on Cardizem CD in place of Verapamil    Chronic kidney disease, stage III (moderate) (HCC)     Essential hypertension     Glaucoma     Heart murmur     Hypercholesterolemia     Hypoglycemia     Raynaud's syndrome     Renal cell carcinoma (HCC)     Stage T1a Furhman Grade 2 s/p right open partial nephrectomy 2/22/10      S/p nephrectomy 2/22/10    right    Scoliosis     Stroke [facility]) 2/2015    resid los of balance        Social History     Socioeconomic History    Marital status:      Spouse name: Not on file    Number of children: Not on file    Years of education: Not on file    Highest education level: Not on file   Occupational History    Not on file   Social Needs    Financial resource strain: Not on file    Food insecurity:     Worry: Not on file     Inability: Not on file    Transportation needs:     Medical: Not on file     Non-medical: Not on file   Tobacco Use    Smoking status: Never Smoker    Smokeless tobacco: Never Used   Substance and Sexual Activity    Alcohol use: No     Alcohol/week: 0.0 standard drinks    Drug use: No    Sexual activity: Yes     Partners: Male   Lifestyle    Physical activity:     Days per week: Not on file     Minutes per session: Not on file    Stress: Not on file   Relationships    Social connections:     Talks on phone: Not on file     Gets together: Not on file     Attends Gnosticism service: Not on file     Active member of club or organization: Not on file     Attends meetings of clubs or organizations: Not on file     Relationship status: Not on file    Intimate partner violence:     Fear of current or ex partner: Not on file     Emotionally abused: Not on file     Physically abused: Not on file Forced sexual activity: Not on file   Other Topics Concern    Not on file   Social History Narrative    Not on file       Current Outpatient Medications   Medication Sig Dispense Refill    [START ON 1/19/2020] acetaminophen-codeine (TYLENOL #3) 300-30 mg per tablet Take 1 Tab by mouth as needed (BID-TID prn chronic pain) for up to 30 days. Max Daily Amount: 3 Tabs. 90 Tab 2    losartan (COZAAR) 100 mg tablet Take 1 Tab by mouth daily. 90 Tab 3    furosemide (LASIX) 20 mg tablet Take 20 mg by mouth daily.  amLODIPine (NORVASC) 5 mg tablet Take 5 mg by mouth daily.  LINZESS 145 mcg cap capsule Take 145 mcg by mouth daily.  aspirin delayed-release 81 mg tablet Take  by mouth daily.  Cholecalciferol, Vitamin D3, 50,000 unit cap Take 1 Cap by mouth every seven (7) days.  PROPYLENE GLYCOL/ (SYSTANE OP) Apply  to eye as needed.  albuterol (PROVENTIL HFA, VENTOLIN HFA) 90 mcg/actuation inhaler Take 2 Puffs by inhalation as needed.  cetirizine (ZYRTEC) 10 mg tablet Take 10 mg by mouth as needed.  atorvastatin (LIPITOR) 80 mg tablet Take 1 Tab by mouth daily. 90 Tab 3       Allergies   Allergen Reactions    Vicodin [Hydrocodone-Acetaminophen] Nausea and Vomiting         REVIEW OF SYSTEMS    Constitutional: Negative for fever, chills, or weight change. Respiratory: Negative for cough or shortness of breath. Cardiovascular: Negative for chest pain or palpitations. Gastrointestinal: Negative for acid reflux, change in bowel habits, or constipation. Genitourinary: Negative for dysuria and flank pain. Musculoskeletal: Positive for lumbar pain and balance difficulties. Skin: Negative for rash. Neurological: Negative for headaches, dizziness, or numbness. Endo/Heme/Allergies: Negative for increased bruising. Psychiatric/Behavioral: Negative for difficulty with sleep.       PHYSICAL EXAMINATION  Visit Vitals  /71 (BP 1 Location: Left arm, BP Patient Position: Sitting)   Pulse 68   Resp 14   Ht 5' 2\" (1.575 m)   Wt 118 lb 12.8 oz (53.9 kg)   SpO2 100%   BMI 21.73 kg/m²       Constitutional: Awake, alert, and in no acute distress. Neurological: 1+ symmetrical DTRs in the lower extremities. Sensation to light touch is intact. Skin: warm, dry, and intact. Musculoskeletal: Tenderness to palpation in the lower lumbar region. Moderate pain with extension and axial loading. No pain with internal or external rotation of her hips. Negative straight leg raise bilaterally. Hip Flex  Quads Hamstrings Ankle DF EHL Ankle PF   Right +4/5 +4/5 +4/5 +4/5 +4/5 +4/5   Left +4/5 +4/5 +4/5 +4/5 +4/5 +4/5     IMAGING:    Lumbar spine MRI from 03/16/2016 was personally reviewed with the patient and demonstrated:  Results from [facility] on 03/16/16   MRI LUMB SPINE W WO CONT     Narrative MRI lumbar spine with and without contrast    COMPARISON: CT chest abdomen and pelvis October 6, 2011    CLINICAL INFORMATION: Progressive back pain, left leg pain. PROCEDURE:    MRI of the lumbar spine was performed prior to and following the uneventful  administration of 11 cc of Magnevist venous leak. Sequences included: Localizer,  sagittal T1, sagittal T1 postcontrast with fat saturation, sagittal inversion  recovery, sagittal T2, axial T1, axial T1 postcontrast, and axial T2    FINDINGS:  There is mild apex right scoliosis of the lumbar spine, with mild apex left  scoliosis of the thoracolumbar junction. 4 mm of grade 1 anterior listhesis of  L4 on L5. Vertebral body heights are preserved. No fracture. Minimal multilevel  small osteophyte formation. Mild disc narrowing and desiccation throughout the lumbar spine. Conus terminates at the L1-2 level. Mild degenerative changes present in the sacroiliac joints. Patient has  undergone previous partial left nephrectomy. High T2 signal foci in the kidneys,  incompletely evaluated on this study.  High T2 signal focus in the posterior  right hepatic lobe is also possibly a cyst, but incompletely evaluated. 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       Correlation of axial and sagittal images reveals the following:    At L1-L2: No significant disc pathology or proliferative changes. No central  canal or foraminal stenosis. At L2-L3: Mild bulging of the posterolateral disc corners, with mild facet  arthropathy and ligamentous buckling. Trace facet joint effusions. Canal is  patent. Mild right greater than left foraminal narrowing. At L3-L4: Mild bulging of the posterolateral disc corners, with mild facet  arthropathy and ligamentous buckling. Trace right facet joint effusion. Canal is  patent. Mild bilateral foraminal narrowing. At L4-L5: Uncovering of the disc due to the listhesis, with diffuse disc bulge. Mild to moderate facet arthropathy. Canal is patent.  Susceptibility artifact in  the posterior soft tissues. Mild bilateral foraminal narrowing. Question prior  left hemilaminectomy. At L5-S1: Small posterior disc bulge, with mild facet arthropathy. Canal is  patent. No significant foraminal narrowing.              Impression IMPRESSION:    Thoracolumbar scoliosis. Grade 1 anterior listhesis of L4 on L5. Multilevel  degenerative changes, with overall patent canal. Multilevel mild foraminal  narrowing. Question prior left hemilaminectomy at L4. High T2 signal foci in the kidneys, incompletely evaluated on this study. High  T2 signal focus in the posterior right hepatic lobe is also possibly a cyst, but  incompletely evaluated.      Written by Michell Azevedo, as dictated by Nisha Xie MD.  I, Dr. Nisha Xie confirm that all documentation is accurate.

## 2020-01-09 ENCOUNTER — OFFICE VISIT (OUTPATIENT)
Dept: ORTHOPEDIC SURGERY | Age: 80
End: 2020-01-09

## 2020-01-09 VITALS
HEIGHT: 62 IN | HEART RATE: 68 BPM | BODY MASS INDEX: 21.86 KG/M2 | DIASTOLIC BLOOD PRESSURE: 71 MMHG | WEIGHT: 118.8 LBS | RESPIRATION RATE: 14 BRPM | OXYGEN SATURATION: 100 % | SYSTOLIC BLOOD PRESSURE: 165 MMHG

## 2020-01-09 DIAGNOSIS — Z79.891 USE OF OPIATES FOR THERAPEUTIC PURPOSES: ICD-10-CM

## 2020-01-09 DIAGNOSIS — G89.29 OTHER CHRONIC PAIN: Primary | ICD-10-CM

## 2020-01-09 DIAGNOSIS — R26.89 BALANCE PROBLEM: ICD-10-CM

## 2020-01-09 DIAGNOSIS — G89.29 OTHER CHRONIC PAIN: ICD-10-CM

## 2020-01-09 DIAGNOSIS — N18.30 CHRONIC KIDNEY DISEASE, STAGE III (MODERATE) (HCC): ICD-10-CM

## 2020-01-09 DIAGNOSIS — Z90.5 S/P NEPHRECTOMY: ICD-10-CM

## 2020-01-09 DIAGNOSIS — M47.816 LUMBAR FACET ARTHROPATHY: ICD-10-CM

## 2020-01-09 DIAGNOSIS — M62.838 MUSCLE SPASM: ICD-10-CM

## 2020-01-09 DIAGNOSIS — Z86.73 HISTORY OF STROKE: ICD-10-CM

## 2020-01-09 RX ORDER — ACETAMINOPHEN AND CODEINE PHOSPHATE 300; 30 MG/1; MG/1
1 TABLET ORAL AS NEEDED
Qty: 90 TAB | Refills: 2 | Status: SHIPPED | OUTPATIENT
Start: 2020-01-19 | End: 2020-06-11 | Stop reason: SDUPTHER

## 2020-01-09 NOTE — PATIENT INSTRUCTIONS
Low Back Arthritis: Exercises  Introduction  Here are some examples of typical rehabilitation exercises for your condition. Start each exercise slowly. Ease off the exercise if you start to have pain. Your doctor or physical therapist will tell you when you can start these exercises and which ones will work best for you. When you are not being active, find a comfortable position for rest. Some people are comfortable on the floor or a medium-firm bed with a small pillow under their head and another under their knees. Some people prefer to lie on their side with a pillow between their knees. Don't stay in one position for too long. Take short walks (10 to 20 minutes) every 2 to 3 hours. Avoid slopes, hills, and stairs until you feel better. Walk only distances you can manage without pain, especially leg pain. How to do the exercises  Pelvic tilt    1. Lie on your back with your knees bent. 2. \"Brace\" your stomach--tighten your muscles by pulling in and imagining your belly button moving toward your spine. 3. Press your lower back into the floor. You should feel your hips and pelvis rock back. 4. Hold for 6 seconds while breathing smoothly. 5. Relax and allow your pelvis and hips to rock forward. 6. Repeat 8 to 12 times. Back stretches    1. Get down on your hands and knees on the floor. 2. Relax your head and allow it to droop. Round your back up toward the ceiling until you feel a nice stretch in your upper, middle, and lower back. Hold this stretch for as long as it feels comfortable, or about 15 to 30 seconds. 3. Return to the starting position with a flat back while you are on your hands and knees. 4. Let your back sway by pressing your stomach toward the floor. Lift your buttocks toward the ceiling. 5. Hold this position for 15 to 30 seconds. 6. Repeat 2 to 4 times. Follow-up care is a key part of your treatment and safety.  Be sure to make and go to all appointments, and call your doctor if you are having problems. It's also a good idea to know your test results and keep a list of the medicines you take. Where can you learn more? Go to http://meena-juan r.info/. Enter P738 in the search box to learn more about \"Low Back Arthritis: Exercises. \"  Current as of: June 26, 2019  Content Version: 12.2  © 4398-0268 [employer]. Care instructions adapted under license by [employer] (which disclaims liability or warranty for this information). If you have questions about a medical condition or this instruction, always ask your healthcare professional. [address] any warranty or liability for your use of this information.

## 2020-01-09 NOTE — LETTER
Name:Blanca Torres BLW:[birth year] MR #:[id number] [address] [place] [address] Page 1 of 5 CONTROLLED SUBSTANCE AGREEMENT I may be prescribed medications that are controlled substances as part  of my treatment plan for management of my medical condition(s). The goal of my treatment plan is to maintain and/or improve my health and wellbeing. Because controlled substances have an increased risk of abuse or harm, continual re-evaluation is needed determine if the goals of my treatment plan are being met for my safety and the safety of others. Hany Steen  am entering into this Controlled Substance Agreement with my provider, Dr. Alyssa Kerns at [address] . I understand that successful treatment requires mutual trust and honesty between me and my provider. I understand that there are state and federal laws and regulations which apply to the medications that my provider may prescribe that must be followed. I understand there are risks and benefits ts of taking the medicines that my provider may prescribe. I understand and agree that following this Agreement is necessary in continuing my provider-patient relationship and success of my treatment plan. As a part of my treatment plan, I agree to the following: COMMUNICATION: 
 
1. I will communicate fully with my provider about my medical condition(s), including the effect on my daily life and how well my medications are helping. I will tell my provider all of the medications that I take for any reason, including medications I receive from another health care provider, and will notify my provider about all issues, problems or concerns, including any side effects, which may be related to my medications. I understand that this information allows my provider to adjust my treatment plan to help manage my medical condition.  I understand that this information will become part of my permanent medical record. 2. I will notify my provider if I have a history of alcohol/drug misuse/addiction or if I have had treatment for alcohol/drug addiction in the past, or if I have a new problem with or concern about alcohol/drug use/addiction, because this increases the likelihood of high risk behaviors and may lead to serious medical conditions. 3. Females Only: I will notify my provider if I am or become pregnant, or if I intend to become pregnant, or if I intend to breastfeed. I understand that communication of these issues with my provider is important, due to possible effects my medication could have on an unborn fetus or breastfeeding child. Initials_____ Name:Radha García J:9/19/1882 MR #:[id number] [address] [place] [address] Page 2 of 5 MISUSE OF MEDICATIONS / DRUGS: 
 
1. I agree to take all controlled substances as prescribed, and will not misuse or abuse any controlled substances prescribed by my provider. For my safety, I will not increase the amount of medicine I take without first talking with and getting permission from my provider. 2. If I have a medical emergency, another health care provider may prescribe me medication. If I seek emergency treatment, I will notify my provider within seventy-two (72) hours. 3. I understand that my provider may discuss my use and/or possible misuse/abuse of controlled substances and alcohol, as appropriate, with any health care provider involved in my care, pharmacist or legal authority. ILLEGAL DRUGS: 
 
1. I will not use illegal drugs of any kind, including but not limited to marijuana, heroin, cocaine, or any prescription drug which is not prescribed to me. DRUG DIVERSION / PRESCRIPTION FRAUD: 
 
1. I will not share, sell, trade, give away, or otherwise misuse my prescriptions or medications. 2. I will not alter any prescriptions provided to me by my provider. SINGLE PROVIDER: 
 
1. I agree that all controlled substances that I take will be prescribed only by my provider (or his/her covering provider) under this Agreement. This agreement does not prevent me from seeking emergency medical treatment or receiving pain management related to a surgery. PROTECTING MEDICATIONS: 
 
1. I am responsible for keeping my prescriptions and medications in a safe and secure place including safeguarding them from loss or theft. I understand that lost, stolen or damaged/destroyed prescriptions or medications will not be replaced. Initials____ Name:Radha Dudley CDK:[birth year] MR #:[id number] [address] [place] [address] Page 3 of 5 PRESCRIPTION RENEWALS/REFILLS: 
 
1. I will follow my controlled substance medication schedule as prescribed by my provider. 2. I understand and agree that I will make any requests for renewals or refills of my prescriptions only at the time of an office visit or during my providers regular office hours subject to the prescription refill requirements of the individual practice. 3. I understand that my provider may not call in prescriptions for controlled substances to my pharmacy. 4. I understand that my provider may adjust or discontinue these medications as deemed appropriate for my medical treatment plan. This Agreement does not guarantee the prescription of controlled medications. 5. I agree that if my medications are adjusted or discontinued, I will properly dispose of any remaining medications. I understand that I will be required to dispose of any remaining controlled medications prior to being provided with any prescriptions for other controlled medications.  
 
6. I understand that the renewal of my prescription depends on my medical condition, my consistent participation, and my adherence with my treatment plan and this Agreement. 7. I understand that if I do not keep an appointment with my provider, I may not receive a renewal or refill for my controlled substance medication. PRESCRIPTION MONITORING / DRUG TESTIN. I understand that my provider may require me to provide urine, saliva or blood for testing at any time. I understand that this testing will be used to monitor for safety and adherence with my treatment plan and this Agreement. 2. I understand that my provider may ask me to provide an observed urine specimen, which means that a nurse or other health care provider may watch me provide urine, and I agree to cooperate if I am asked to provide an observed specimen. 3. I understand that if I do not provide urine, saliva or blood samples within two (2) hours of my providers request, or other timeframe decided by my provider, my treatment plan could be changed, or my prescriptions and medications may be changed or ended. 4. I understand that urine, saliva and blood test results will be a part of my permanent medical record. Initials_____ Name:Radha Jaiems UMB:[id number] MR #:[id number] [address] [place] [address] Page 4 of 5 
 
 
1. I authorize my provider and my pharmacy to cooperate fully with any local, state, or federal law enforcement agency in the investigation of any possible misuse, sale, or other diversion of my controlled substance prescriptions or medications. RISKS: 
 
1. I understand that my level of consciousness may be affected from the use of controlled substances, and I understand that there are risks, benefits, effects and potential alternatives (including no treatment) to the medications that my provider has prescribed. 2. I understand that I may become drowsy, tired, dizzy, constipated, and sick to my stomach, or have changes in my mood or in my sleep while taking my medications. I have talked with my provider about these possible side effects, risks, benefits, and alternative treatments, and my provider has answered all of my questions. 3. I understand that I should not suddenly stop taking my medications without first speaking with my provider. I understand that if I suddenly stop taking my medications, I may experience nausea, vomiting, sweating,anxiety, sleeplessness, itching or other uncomfortable feelings. 4. I will not take my medications with alcohol of any kind, including beer, wine or liquor. I understand that drinking alcohol with my medications increases the chances of side effects, including breathing problems or even death. 5. I understand that if I have a history of alcoholism or other drug addiction I may be at increased risk of addiction to my medications. Signs of addiction might include craving, compulsive use, and continued use despite harm. Since addiction is a disease, I understand my provider may decide to change my medications and refer me to appropriate treatment services. I understand that this information would become part of my permanent medical record. Initials_____ Name:Radha Yessenia Villaseñor ALMA:[birth year] MR #:[id number] [address] Page 5 of 5  
 
 
6. Females only: Children born to women who regularly take controlled substances are likely to have physical problems and suffer withdrawal symptoms at birth. If I am of child-bearing age, I understand that I should use safe and effective birth control while taking any controlled substances to avoid the impact of medications on an unborn fetus or  child. I agree to notify my provider immediately if I should become pregnant so that my treatment plan can be adjusted. 7. Males only: I understand that chronic use of controlled substances has been associated with low testosterone levels in males which may affect my mood, stamina, sexual desire, and general health. I understand that my provider may order the appropriate laboratory test to determine my testosterone level,and I agree to this testing. ADHERENCE: 
 
1. I understand that if I do not adhere to this Agreement in any way, my provider may change my prescriptions, stop prescribing controlled substances or end our provider-patient relationship. 2. If my provider decides to stop prescribing medication, or decides to end our provider-patient relationship,my provider may require that I taper my medications slowly. If necessary, my provider may also provide a prescription for other medications to treat my withdrawal symptoms. UNDERSTANDING THIS AGREEMENT: 
 
I understand that my provider may adjust or stop my prescriptions for controlled substances based on my medical condition and my treatment plan. I understand that this Agreement does not guarantee that I will be prescribed medications or controlled substances. I understand that controlled substances may be just one part 
of my treatment plan.  
 
My initial on each page and my signature below shows that I have read each page of this Agreement, I have had an opportunity to ask questions, and all of my questions have been answered to my satisfaction by my provider. By signing below, I agree to comply with this Agreement, and I understand that if I do not follow the Agreements listed above, my provider may stop 
 
_________________________________________  Date/Time 1/9/2020 8:13 AM   
             (Patient Signature) 
 
_________________________________________ Date/Time 1/9/2020 8:13 AM 
 (Provider Signature)

## 2020-01-09 NOTE — LETTER
1/10/20 Patient: Mandi Bryant YOB: 1940 Date of Visit: 1/9/2020 Payal Romero MD 
[address] [place] [address], [place] [address] VIA Facsimile: [phone] Dear Payal Romero MD, Thank you for referring Ms. Katerin Denny to [facility] for evaluation. My notes for this consultation are attached. If you have questions, please do not hesitate to call me. I look forward to following your patient along with you. Sincerely, Mehul Ulloa MD

## 2020-01-10 ENCOUNTER — APPOINTMENT (OUTPATIENT)
Dept: PHYSICAL THERAPY | Age: 80
End: 2020-01-10
Payer: MEDICARE

## 2020-01-14 ENCOUNTER — APPOINTMENT (OUTPATIENT)
Dept: PHYSICAL THERAPY | Age: 80
End: 2020-01-14
Payer: MEDICARE

## 2020-01-16 ENCOUNTER — APPOINTMENT (OUTPATIENT)
Dept: PHYSICAL THERAPY | Age: 80
End: 2020-01-16
Payer: MEDICARE

## 2020-01-29 ENCOUNTER — HOSPITAL ENCOUNTER (OUTPATIENT)
Dept: LAB | Age: 80
Discharge: HOME | End: 2020-01-29
Payer: MEDICARE

## 2020-01-29 PROCEDURE — 88305 TISSUE EXAM BY PATHOLOGIST: CPT

## 2020-02-07 ENCOUNTER — HOSPITAL ENCOUNTER (OUTPATIENT)
Age: 80
Discharge: HOME | End: 2020-02-07
Attending: PSYCHIATRY & NEUROLOGY
Payer: MEDICARE

## 2020-02-07 DIAGNOSIS — R26.9 GAIT DISORDER: ICD-10-CM

## 2020-02-07 PROCEDURE — 70551 MRI BRAIN STEM W/O DYE: CPT

## 2020-04-02 ENCOUNTER — VIRTUAL VISIT (OUTPATIENT)
Dept: ORTHOPEDIC SURGERY | Age: 80
End: 2020-04-02

## 2020-04-02 DIAGNOSIS — M47.816 LUMBAR FACET ARTHROPATHY: ICD-10-CM

## 2020-04-02 DIAGNOSIS — G89.29 OTHER CHRONIC PAIN: Primary | ICD-10-CM

## 2020-04-02 DIAGNOSIS — N18.30 CHRONIC KIDNEY DISEASE, STAGE III (MODERATE) (HCC): ICD-10-CM

## 2020-04-02 DIAGNOSIS — Z79.891 USE OF OPIATES FOR THERAPEUTIC PURPOSES: ICD-10-CM

## 2020-04-02 DIAGNOSIS — Z90.5 S/P NEPHRECTOMY: ICD-10-CM

## 2020-04-02 RX ORDER — ACETAMINOPHEN AND CODEINE PHOSPHATE 300; 30 MG/1; MG/1
1 TABLET ORAL AS NEEDED
Qty: 90 TAB | Refills: 2 | Status: CANCELLED | OUTPATIENT
Start: 2020-04-22 | End: 2020-05-22

## 2020-04-02 RX ORDER — ACETAMINOPHEN AND CODEINE PHOSPHATE 300; 30 MG/1; MG/1
1 TABLET ORAL AS NEEDED
Qty: 90 TAB | Refills: 2 | Status: CANCELLED | OUTPATIENT
Start: 2020-06-21 | End: 2020-07-21

## 2020-04-02 NOTE — PATIENT INSTRUCTIONS
Low Back Arthritis: Exercises Introduction Here are some examples of typical rehabilitation exercises for your condition. Start each exercise slowly. Ease off the exercise if you start to have pain. Your doctor or physical therapist will tell you when you can start these exercises and which ones will work best for you. When you are not being active, find a comfortable position for rest. Some people are comfortable on the floor or a medium-firm bed with a small pillow under their head and another under their knees. Some people prefer to lie on their side with a pillow between their knees. Don't stay in one position for too long. Take short walks (10 to 20 minutes) every 2 to 3 hours. Avoid slopes, hills, and stairs until you feel better. Walk only distances you can manage without pain, especially leg pain. How to do the exercises Pelvic tilt 1. Lie on your back with your knees bent. 2. \"Brace\" your stomachtighten your muscles by pulling in and imagining your belly button moving toward your spine. 3. Press your lower back into the floor. You should feel your hips and pelvis rock back. 4. Hold for 6 seconds while breathing smoothly. 5. Relax and allow your pelvis and hips to rock forward. 6. Repeat 8 to 12 times. Back stretches 1. Get down on your hands and knees on the floor. 2. Relax your head and allow it to droop. Round your back up toward the ceiling until you feel a nice stretch in your upper, middle, and lower back. Hold this stretch for as long as it feels comfortable, or about 15 to 30 seconds. 3. Return to the starting position with a flat back while you are on your hands and knees. 4. Let your back sway by pressing your stomach toward the floor. Lift your buttocks toward the ceiling. 5. Hold this position for 15 to 30 seconds. 6. Repeat 2 to 4 times. Follow-up care is a key part of your treatment and safety.  Be sure to make and go to all appointments, and call your doctor if you are having problems. It's also a good idea to know your test results and keep a list of the medicines you take. Where can you learn more? Go to http://meena-juan r.info/ Enter R434 in the search box to learn more about \"Low Back Arthritis: Exercises. \" Current as of: June 26, 2019Content Version: 12.4 © 9450-4815 [employer]. Care instructions adapted under license by [employer] (which disclaims liability or warranty for this information). If you have questions about a medical condition or this instruction, always ask your healthcare professional. [address] any warranty or liability for your use of this information.

## 2020-04-02 NOTE — PROGRESS NOTES
[facility]  [address] [place], [address]  Phone: [phone]  Fax: [phone]    Pt's YOB: 1940    ASSESSMENT   Diagnoses and all orders for this visit:    1. Other chronic pain    2. Use of opiates for therapeutic purposes    3. Lumbar facet arthropathy    4. Chronic kidney disease, stage III (moderate) ([facility])    5. S/p right nephrectomy for cancer in 1/10         IMPRESSION AND PLAN:  Estefania Stevens is a 68 y.o. female with history of chronic lumbar pain. She denies any change in symptoms since her last office visit. Pt continues to take Tylenol #3 1 tab BID-TID as her pain warrants. 1) Pt was given information on lumbar arthritis exercises. 2) She will continue taking Tylenol #3 as prescribed and does not need a refill at this time. Pt will contact the office when she needs a refill. 3) Pt is not a candidate for NSAID's due to renal disease and a partial nephrectomy for cancer.   4) Ms. Chelsi Hernandez has a reminder for a \"due or due soon\" health maintenance. I have asked that she contact her primary care provider, Lisy Riveor MD, for follow-up on this health maintenance. 5)  demonstrated consistency with prescribing. 6) Last UDS from 04/04/2019 was consistent. Follow-up and Dispositions    · Return in about 3 months (around 7/2/2020) for Medication follow up for in office visit. Miracle Cortés CPT Codes 66749-29737 for Established Patients may apply to this TeleHealth Visit  Time-based coding, delete if not needed: I spent at least 15 minutes with this established patient, and >50% of the time was spent counseling and/or coordinating care regarding her symptoms and medications. Due to this being a TeleHealth evaluation, many elements of the physical examination are unable to be assessed.      Pursuant to the emergency declaration under the [address] [facility] waiver authority and the Coronavirus Preparedness and Response Supplemental Appropriations Act, this Virtual  Visit was conducted, with patient's consent, to reduce the patient's risk of exposure to COVID-19 and provide continuity of care for an established patient. Services were provided through a video synchronous discussion virtually to substitute for in-person clinic visit. HISTORY OF PRESENT ILLNESS:  Anthony Bach is a 68 y.o. female with history of chronic lumbar pain and is seen from her car by synchronous (real-time) audio-video technology via doxy. me on 4/2/2020 with her verbal consent for follow up. She denies any change in symptoms since her last office visit. Pt reports increased pain with changes in weather and notes that her pain generally improves with warmer weather. She continues to take Tylenol #3 1 tab BID-TID as her pain warrants. Her MME is 4.5 - 13.5 depending on her pain level. She keeps her medication secure. She also reports she has not been as active due to the recent corona virus restrictions and weather changes. She still has another refill left on her current prescription. She denies any medication side effects and uses her medication to perform her activities of daily living. Pt at this time desires to continue with current care. Pain Scale: /10    PCP: Jerome Sever, MD     Past Medical History:   Diagnosis Date    Anemia NEC     Aneurysm ([address]     left kidney    Arthritis     Asthma     Cardiac catheterization 02/23/2015    R dominant. LM patent. mLAD 20%. CX patent. mRCA 20%. LVEDP 8.  EF 60%. Mild AS. RA 3/1. PA 24. W 6/5.  CI 2.94.    Cardiac echocardiogram 07/27/2016    EF 60-65%. No WMA. Mild LVH. Normal LV diastolic fx. Mild LAE. Mod-severe AS (mean grad 34 mmHg). AS has worsened since study of 1/20/15. Mild MR.  Cardiac nuclear imaging test 02/10/2015    No evidence of ischemia or prior infarction. Hyperdynamic LV fx.  EF >70%. No RWMA.   Nondiagnostic EKG on pharm stress test.  Low risk.  Carotid duplex 01/12/2016    Mild < 50% bilateral ICA stenosis.       Chest tightness     exertional, in the setting of widely patent coronary arteries, possibly related to elevated left ventricular end-diastolic pressure or possibly to small-vessel disease, improved on Cardizem CD in place of Verapamil    Chronic kidney disease, stage III (moderate) (HCC)     Essential hypertension     Glaucoma     Heart murmur     Hypercholesterolemia     Hypoglycemia     Raynaud's syndrome     Renal cell carcinoma (HCC)     Stage T1a Furhman Grade 2 s/p right open partial nephrectomy 2/22/10      S/p nephrectomy 2/22/10    right    Scoliosis     Stroke [facility]) 2/2015    resid los of balance        Social History     Socioeconomic History    Marital status:      Spouse name: Not on file    Number of children: Not on file    Years of education: Not on file    Highest education level: Not on file   Occupational History    Not on file   Social Needs    Financial resource strain: Not on file    Food insecurity     Worry: Not on file     Inability: Not on file    Transportation needs     Medical: Not on file     Non-medical: Not on file   Tobacco Use    Smoking status: Never Smoker    Smokeless tobacco: Never Used   Substance and Sexual Activity    Alcohol use: No     Alcohol/week: 0.0 standard drinks    Drug use: No    Sexual activity: Yes     Partners: Male   Lifestyle    Physical activity     Days per week: Not on file     Minutes per session: Not on file    Stress: Not on file   Relationships    Social connections     Talks on phone: Not on file     Gets together: Not on file     Attends Advent service: Not on file     Active member of club or organization: Not on file     Attends meetings of clubs or organizations: Not on file     Relationship status: Not on file    Intimate partner violence     Fear of current or ex partner: Not on file     Emotionally abused: Not on file     Physically abused: Not on file     Forced sexual activity: Not on file   Other Topics Concern    Not on file   Social History Narrative    Not on file       Current Outpatient Medications   Medication Sig Dispense Refill    losartan (COZAAR) 100 mg tablet Take 1 Tab by mouth daily. 90 Tab 3    furosemide (LASIX) 20 mg tablet Take 20 mg by mouth daily.  amLODIPine (NORVASC) 5 mg tablet Take 5 mg by mouth daily.  LINZESS 145 mcg cap capsule Take 145 mcg by mouth daily.  aspirin delayed-release 81 mg tablet Take  by mouth daily.  atorvastatin (LIPITOR) 80 mg tablet Take 1 Tab by mouth daily. 90 Tab 3    Cholecalciferol, Vitamin D3, 50,000 unit cap Take 1 Cap by mouth every seven (7) days.  PROPYLENE GLYCOL/ (SYSTANE OP) Apply  to eye as needed.  albuterol (PROVENTIL HFA, VENTOLIN HFA) 90 mcg/actuation inhaler Take 2 Puffs by inhalation as needed.  cetirizine (ZYRTEC) 10 mg tablet Take 10 mg by mouth as needed.  acetaminophen-codeine (TYLENOL #3) 300-30 mg per tablet Take 1 Tab by mouth as needed (BID-TID prn chronic pain) for up to 30 days. Max Daily Amount: 3 Tabs. 90 Tab 2       Allergies   Allergen Reactions    Vicodin [Hydrocodone-Acetaminophen] Nausea and Vomiting         REVIEW OF SYSTEMS    Constitutional: Negative for fever, chills, or weight change. Respiratory: Negative for cough or shortness of breath. Musculoskeletal: Positive for lumbar and joint pain. Psychiatric/Behavioral: Negative for difficulty with sleep. As per HPI      IMAGING:    Lumbar spine MRI from 03/16/2016 was personally reviewed with the patient and demonstrated:  Results from [facility] on 03/16/16   MRI LUMB SPINE W WO CONT     Narrative MRI lumbar spine with and without contrast    COMPARISON: CT chest abdomen and pelvis October 6, 2011    CLINICAL INFORMATION: Progressive back pain, left leg pain.     PROCEDURE:    MRI of the lumbar spine was performed prior to and following the uneventful  administration of 11 cc of Magnevist venous leak. Sequences included: Localizer,  sagittal T1, sagittal T1 postcontrast with fat saturation, sagittal inversion  recovery, sagittal T2, axial T1, axial T1 postcontrast, and axial T2    FINDINGS:  There is mild apex right scoliosis of the lumbar spine, with mild apex left  scoliosis of the thoracolumbar junction. 4 mm of grade 1 anterior listhesis of  L4 on L5. Vertebral body heights are preserved. No fracture. Minimal multilevel  small osteophyte formation. Mild disc narrowing and desiccation throughout the lumbar spine. Conus terminates at the L1-2 level. Mild degenerative changes present in the sacroiliac joints. Patient has  undergone previous partial left nephrectomy. High T2 signal foci in the kidneys,  incompletely evaluated on this study. High T2 signal focus in the posterior  right hepatic lobe is also possibly a cyst, but incompletely evaluated. 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       Correlation of axial and sagittal images reveals the following:    At L1-L2: No significant disc pathology or proliferative changes. No central  canal or foraminal stenosis. At L2-L3: Mild bulging of the posterolateral disc corners, with mild facet  arthropathy and ligamentous buckling. Trace facet joint effusions. Canal is  patent. Mild right greater than left foraminal narrowing. At L3-L4: Mild bulging of the posterolateral disc corners, with mild facet  arthropathy and ligamentous buckling. Trace right facet joint effusion. Canal is  patent. Mild bilateral foraminal narrowing. At L4-L5: Uncovering of the disc due to the listhesis, with diffuse disc bulge. Mild to moderate facet arthropathy. Canal is patent.  Susceptibility artifact in  the posterior soft tissues. Mild bilateral foraminal narrowing. Question prior  left hemilaminectomy. At L5-S1: Small posterior disc bulge, with mild facet arthropathy. Canal is  patent. No significant foraminal narrowing.              Impression IMPRESSION:    Thoracolumbar scoliosis. Grade 1 anterior listhesis of L4 on L5. Multilevel  degenerative changes, with overall patent canal. Multilevel mild foraminal  narrowing. Question prior left hemilaminectomy at L4. High T2 signal foci in the kidneys, incompletely evaluated on this study. High  T2 signal focus in the posterior right hepatic lobe is also possibly a cyst, but  incompletely evaluated.          Written by Damaris Sanchez, as dictated by Minal Mock MD.  I, Dr. Minal Mock confirm that all documentation is accurate.

## 2020-04-07 ENCOUNTER — VIRTUAL VISIT (OUTPATIENT)
Dept: NEUROLOGY | Age: 80
End: 2020-04-07

## 2020-04-07 VITALS — WEIGHT: 117 LBS | HEIGHT: 62 IN | BODY MASS INDEX: 21.53 KG/M2

## 2020-04-07 DIAGNOSIS — R26.9 GAIT DISORDER: Primary | ICD-10-CM

## 2020-04-07 RX ORDER — UREA 10 %
100 LOTION (ML) TOPICAL DAILY
COMMUNITY

## 2020-04-07 NOTE — PROGRESS NOTES
Jacob Stuart is a 68 y.o. female on virtual visit for follow-up on gait disorder. 1. Have you been to the ER, urgent care clinic since your last visit? Hospitalized since your last visit? No    2. Have you seen or consulted any other health care providers outside of the [address] [place] [address] since your last visit? Include any pap smears or colon screening.  No

## 2020-04-07 NOTE — PROGRESS NOTES
Re:  Aparna Middleton up visit     4/7/2020 10:57 AM    SSN: [id number]    Subjective: Anthony Bach returns for follow up of her stroke. She feels well and now rarely uses the cane for ambulation. Medications:    Current Outpatient Medications   Medication Sig Dispense Refill    cyanocobalamin (VITAMIN B12) 100 mcg tablet Take 100 mcg by mouth daily.  losartan (COZAAR) 100 mg tablet Take 1 Tab by mouth daily. 90 Tab 3    furosemide (LASIX) 20 mg tablet Take 20 mg by mouth daily.  amLODIPine (NORVASC) 5 mg tablet Take 5 mg by mouth daily.  LINZESS 145 mcg cap capsule Take 145 mcg by mouth daily.  aspirin delayed-release 81 mg tablet Take  by mouth daily.  atorvastatin (LIPITOR) 80 mg tablet Take 1 Tab by mouth daily. 90 Tab 3    Cholecalciferol, Vitamin D3, 50,000 unit cap Take 1 Cap by mouth every seven (7) days.  PROPYLENE GLYCOL/ (SYSTANE OP) Apply  to eye as needed.  albuterol (PROVENTIL HFA, VENTOLIN HFA) 90 mcg/actuation inhaler Take 2 Puffs by inhalation as needed.  cetirizine (ZYRTEC) 10 mg tablet Take 10 mg by mouth as needed.  acetaminophen-codeine (TYLENOL #3) 300-30 mg per tablet Take 1 Tab by mouth as needed (BID-TID prn chronic pain) for up to 30 days. Max Daily Amount: 3 Tabs.  90 Tab 2       Vital signs:    Visit Vitals  Ht 5' 2\" (1.575 m)   Wt 53.1 kg (117 lb)   BMI 21.40 kg/m²       Review of Systems:   As above otherwise 11 point review of systems negative including;   Constitutional no fever or chills  Skin denies rash or itching  HEENT  Denies tinnitus, hearing lose  Eyes denies diplopia vision lose  Respiratory denies sortness of breath  Cardiovascular denies chest pain, dyspnea on exertion  Gastrointestinal denies nausea, vomiting, diarrhea, constipation  Genitourinary denies incontinence  Musculoskeletal denies joint pain or swelling  Endocrine denies weight change  Hematology denies easy bruising or bleeding   Neurological as above in HPI      Patient Active Problem List   Diagnosis Code    Asthma J45.909    Chronic kidney disease, stage III (moderate) (HCC) N18.3    Anemia D64.9    S/p right nephrectomy for cancer in 1/10 Z90.5    Dyslipidemia E78.5    Hypertension I10    Raynaud's phenomenon I73.00    Hemiparesis, left     Aortic stenosis I35.0    Chest pain R07.9    Renal artery aneurysm (HCC) I72.2    DDD (degenerative disc disease), lumbar M51.36    Lumbar facet arthropathy M47.816    HNP (herniated nucleus pulposus), lumbar M51.26    Trochanteric bursitis, right hip M70.61    Chronic pain G89.29    Therapeutic drug monitoring Z51.81    Muscle spasm of back M62.830    Chronic anticoagulation Z79.01         Objective: The patient is awake, alert, and oriented x 4. Fund of knowledge is adequate. Speech is fluent and memory is intact. Cranial Nerves: II  Visual fields are full to confrontation. III, IV, VI  Extraocular movements are intact. There is no nystagmus. VII  Face is symmetrical.  VIII - Hearing is present. IX, X, XII  Palate is symmetrical.   XI - Shoulder shrugging and head turning intact  Motor: The patient moves all four limbs fairly well and symmetrically.   Gait is normal.    CBC:   Lab Results   Component Value Date/Time    WBC 5.3 02/21/2015 02:02 AM    RBC 3.45 (L) 02/21/2015 02:02 AM    HGB 10.8 (L) 02/21/2015 02:02 AM    HCT 32.0 (L) 02/21/2015 02:02 AM    PLATELET 604 89/37/1684 02:02 AM     BMP:   Lab Results   Component Value Date/Time    Glucose 85 02/23/2015 01:36 AM    Sodium 139 02/23/2015 01:36 AM    Potassium 3.5 02/23/2015 01:36 AM    Chloride 105 02/23/2015 01:36 AM    CO2 25 02/23/2015 01:36 AM    BUN 13 02/23/2015 01:36 AM    Creatinine 0.87 02/23/2015 01:36 AM    Calcium 8.8 02/23/2015 01:36 AM     CMP:   Lab Results   Component Value Date/Time    Glucose 85 02/23/2015 01:36 AM    Sodium 139 02/23/2015 01:36 AM    Potassium 3.5 02/23/2015 01:36 AM Chloride 105 02/23/2015 01:36 AM    CO2 25 02/23/2015 01:36 AM    BUN 13 02/23/2015 01:36 AM    Creatinine 0.87 02/23/2015 01:36 AM    Calcium 8.8 02/23/2015 01:36 AM    Anion gap 9 02/23/2015 01:36 AM    BUN/Creatinine ratio 15 02/23/2015 01:36 AM    Alk. phosphatase 66 02/05/2015 12:00 AM    Protein, total 6.6 02/05/2015 12:00 AM    Albumin 4.5 02/05/2015 12:00 AM    Globulin 2.6 08/14/2010 08:17 AM    A-G Ratio 2.1 02/05/2015 12:00 AM     Coagulation:   Lab Results   Component Value Date/Time    Prothrombin time 12.5 02/19/2015 09:18 PM    INR 0.9 02/19/2015 09:18 PM    aPTT 31.9 02/19/2015 09:18 PM     Cardiac markers:   Lab Results   Component Value Date/Time    CK 60 02/19/2015 09:18 PM    CK-MB Index CANNOT BE CALCULATED 02/19/2015 09:18 PM     Final result (Exam End: 2/7/2020 11:14) Provider Status: Reviewed   Result Notes for MRI BRAIN WO CONT     Notes recorded by Gagandeep Aviles LPN on 2/80/3857 at 4:36 PM EDT  Called, per answering party, patient unavailable at this time. ------    Notes recorded by Gagandeep Aviles LPN on 9/20/6948 at 71:32 AM EST  Called, no answer, no voicemail.          Study Result     Brain MR without contrast      HISTORY: Gait disorder, recent 12 replacement.     COMPARISON: CT 12/8/2019, MRI 2/20/2015     TECHNIQUE: Brain scanned with sagittal and axial T1W scans, axial T2W , axial  FLAIR, axial diffusion weighted images and coronal GRE.      FINDINGS:      Cerebral parenchyma: Mild burden of T2 and FLAIR hyperintense periventricular  white matter abnormality. Old lacunar infarct in the left lentiform nucleus with  gliotic changes extending superiorly to the corona radiata region. No acute  infarction.  No hemorrhage or mass lesion.     Brain volumes and ventricular system: Mild global cerebral atrophy with a  proportionate amount of ex vacuo ventriculomegaly.     Midline structures: Normal.     Cerebellum: Small linear T2 hyperintense defect in the left cerebellum is most  consistent with a chronic infarct which is new since previous.     Brainstem: Small amount of patchy T2 and FLAIR hyperintensities in the yasmine.     Vascular system: Expected arterial flow voids are present at the base of brain.     Calvarium and skull base: Normal.     Paranasal sinuses and mastoid air cells: Clear.     Visualized orbits: Intraocular lens replacements.     Visualized upper cervical spine: Unremarkable for a nondedicated exam.     IMPRESSION  IMPRESSION:     1. No acute brain abnormalities. 2.  Minimally progressed chronic microvascular ischemic disease. 3.  Old lacunar infarct in the left basal ganglia, stable. 4.  New but chronic infarct in the left cerebellum.            Assessment:  Post small lacune infarction who has risk factors including hypertension, advanced age. Currently on stroke prevention therapy. Plan:  Not a lot that I'm going to do at this time. Continue current care plan. Sincerely,        Giulia Quinonez. Robby Vega M.D. Consent: Marjorie Alejandre, who was seen by synchronous (real-time) audio-video technology, and/or her healthcare decision maker, is aware that this patient-initiated, Telehealth encounter on 4/7/2020 is a billable service, with coverage as determined by her insurance carrier. She is aware that she may receive a bill and has provided verbal consent to proceed: Yes. She was seen for 15 minutes, greater than 50% spent in consultation. We discussed the expected course, resolution and complications of the diagnosis(es) in detail. Medication risks, benefits, costs, interactions, and alternatives were discussed as indicated. I advised her to contact the office if her condition worsens, changes or fails to improve as anticipated. She expressed understanding with the diagnosis(es) and plan. Marjorie Alejandre is a 68 y.o. female being evaluated by a video visit encounter for concerns as above. A caregiver was present when appropriate.  Due to this being a TeleHealth encounter (During MIRLT-07 public health emergency), evaluation of the following organ systems was limited: Vitals/Constitutional/EENT/Resp/CV/GI//MS/Neuro/Skin/Heme-Lymph-Imm. Pursuant to the emergency declaration under the [address] [place] [address], [facility] waiver authority and the [employer] Act, this Virtual  Visit was conducted, with patient's (and/or legal guardian's) consent, to reduce the patient's risk of exposure to COVID-19 and provide necessary medical care. Services were provided through a video synchronous discussion virtually to substitute for in-person clinic visit. Patient and provider were located at their individual homes.         Donavon Alvarez MD

## 2020-06-05 DIAGNOSIS — Z79.891 USE OF OPIATES FOR THERAPEUTIC PURPOSES: ICD-10-CM

## 2020-06-05 DIAGNOSIS — G89.29 OTHER CHRONIC PAIN: ICD-10-CM

## 2020-06-05 DIAGNOSIS — M47.816 LUMBAR FACET ARTHROPATHY: ICD-10-CM

## 2020-06-05 NOTE — TELEPHONE ENCOUNTER
Last Visit: 4/2/20 with MD Katerina Waters  Next Appointment: 7/2/20 with MD Katerina Waters  Previous Refill Encounter(s): 1/19/20 #90 with 2 refills    Requested Prescriptions     Pending Prescriptions Disp Refills    acetaminophen-codeine (TYLENOL #3) 300-30 mg per tablet 90 Tab 2     Sig: Take 1 Tab by mouth as needed (BID-TID prn chronic pain) for up to 30 days. Max Daily Amount: 3 Tabs.

## 2020-06-08 RX ORDER — ACETAMINOPHEN AND CODEINE PHOSPHATE 300; 30 MG/1; MG/1
1 TABLET ORAL AS NEEDED
Qty: 90 TAB | Refills: 0 | OUTPATIENT
Start: 2020-06-08 | End: 2020-07-08

## 2020-06-08 NOTE — TELEPHONE ENCOUNTER
Returned call to patient, verified Name/, informed of need for sooner appt. Patient verbalized agreement, VV scheduled for 2020 for 0845. Patient verbalized agreement/understanding. No further action required at this time.

## 2020-06-11 ENCOUNTER — VIRTUAL VISIT (OUTPATIENT)
Dept: ORTHOPEDIC SURGERY | Age: 80
End: 2020-06-11

## 2020-06-11 DIAGNOSIS — N18.30 CHRONIC KIDNEY DISEASE, STAGE III (MODERATE) (HCC): ICD-10-CM

## 2020-06-11 DIAGNOSIS — Z79.891 USE OF OPIATES FOR THERAPEUTIC PURPOSES: ICD-10-CM

## 2020-06-11 DIAGNOSIS — G89.29 OTHER CHRONIC PAIN: Primary | ICD-10-CM

## 2020-06-11 DIAGNOSIS — Z90.5 S/P NEPHRECTOMY: ICD-10-CM

## 2020-06-11 DIAGNOSIS — M47.816 LUMBAR FACET ARTHROPATHY: ICD-10-CM

## 2020-06-11 RX ORDER — ACETAMINOPHEN AND CODEINE PHOSPHATE 300; 30 MG/1; MG/1
1 TABLET ORAL AS NEEDED
Qty: 60 TAB | Refills: 0 | Status: SHIPPED | OUTPATIENT
Start: 2020-06-20 | End: 2020-08-17

## 2020-06-11 NOTE — PATIENT INSTRUCTIONS
Low Back Arthritis: Exercises Introduction Here are some examples of typical rehabilitation exercises for your condition. Start each exercise slowly. Ease off the exercise if you start to have pain. Your doctor or physical therapist will tell you when you can start these exercises and which ones will work best for you. When you are not being active, find a comfortable position for rest. Some people are comfortable on the floor or a medium-firm bed with a small pillow under their head and another under their knees. Some people prefer to lie on their side with a pillow between their knees. Don't stay in one position for too long. Take short walks (10 to 20 minutes) every 2 to 3 hours. Avoid slopes, hills, and stairs until you feel better. Walk only distances you can manage without pain, especially leg pain. How to do the exercises Pelvic tilt 1. Lie on your back with your knees bent. 2. \"Brace\" your stomachtighten your muscles by pulling in and imagining your belly button moving toward your spine. 3. Press your lower back into the floor. You should feel your hips and pelvis rock back. 4. Hold for 6 seconds while breathing smoothly. 5. Relax and allow your pelvis and hips to rock forward. 6. Repeat 8 to 12 times. Back stretches 1. Get down on your hands and knees on the floor. 2. Relax your head and allow it to droop. Round your back up toward the ceiling until you feel a nice stretch in your upper, middle, and lower back. Hold this stretch for as long as it feels comfortable, or about 15 to 30 seconds. 3. Return to the starting position with a flat back while you are on your hands and knees. 4. Let your back sway by pressing your stomach toward the floor. Lift your buttocks toward the ceiling. 5. Hold this position for 15 to 30 seconds. 6. Repeat 2 to 4 times. Follow-up care is a key part of your treatment and safety.  Be sure to make and go to all appointments, and call your doctor if you are having problems. It's also a good idea to know your test results and keep a list of the medicines you take. Where can you learn more? Go to http://meena-juan r.info/ Enter Y203 in the search box to learn more about \"Low Back Arthritis: Exercises. \" Current as of: March 2, 2020               Content Version: 12.5 © 2006-2020 [employer]. Care instructions adapted under license by [employer] (which disclaims liability or warranty for this information). If you have questions about a medical condition or this instruction, always ask your healthcare professional. [address] any warranty or liability for your use of this information.

## 2020-06-11 NOTE — PROGRESS NOTES
[facility]  [address] [place] [address], [facility] [address]  Phone: [phone]  Fax: [phone]    Pt's YOB: 1940    ASSESSMENT   Diagnoses and all orders for this visit:    1. Other chronic pain  -     acetaminophen-codeine (TYLENOL #3) 300-30 mg per tablet; Take 1 Tab by mouth as needed (BID prn chronic pain) for up to 30 days. Max Daily Amount: 2 Tabs. 2. Use of opiates for therapeutic purposes  -     acetaminophen-codeine (TYLENOL #3) 300-30 mg per tablet; Take 1 Tab by mouth as needed (BID prn chronic pain) for up to 30 days. Max Daily Amount: 2 Tabs. 3. Lumbar facet arthropathy  -     acetaminophen-codeine (TYLENOL #3) 300-30 mg per tablet; Take 1 Tab by mouth as needed (BID prn chronic pain) for up to 30 days. Max Daily Amount: 2 Tabs. 4. Chronic kidney disease, stage III (moderate) ([facility])    5. S/p right nephrectomy for cancer in 1/10         IMPRESSION AND PLAN:  Riley Josue is a 68 y.o. female with history of chronic lumbar pain. She denies any change in symptoms since her last office visit. Pt takes Tylenol #3 and takes 1-2 tabs daily as her pain warrants with benefit. 1) Pt was given information on lumbar arthritis exercises. 2) She received a refill of Tylenol #3 1 tab BID prn chronic pain. 3) Pt is not a candidate for NSAID's due to renal disease and a partial nephrectomy for cancer. 4) Ms. Rocael Hoang has a reminder for a \"due or due soon\" health maintenance. I have asked that she contact her primary care provider, Tiffanie Merritt DO, for follow-up on this health maintenance. 5)  demonstrated consistency with prescribing. 6) Last UDS from 04/04/2019 was consistent. Follow-up and Dispositions    · Return in about 6 weeks (around 7/23/2020) for Medication follow up and UDS.          CPT Codes 24604-66230 for Established Patients may apply to this TeleHealth Visit  Time-based coding, delete if not needed: I spent at least 12 minutes and 6 seconds from 10:45 AM to 10:57 AM with this established patient, and >50% of the time was spent counseling and/or coordinating care regarding her symptoms and medications. Due to this being a TeleHealth evaluation, many elements of the physical examination are unable to be assessed. Pursuant to the emergency declaration under the [address] [place] [address] [facility] authority and the [employer] Act, this Virtual Visit was conducted, with patient's consent, to reduce the patient's risk of exposure to COVID-19 and provide continuity of care for an established patient. Services were provided through a telephone discussion to substitute for in-person clinic visit. HISTORY OF PRESENT ILLNESS:  Estefania Stevens is a 68 y.o. female with history of chronic lumbar pain and is seen from her home by telephone at the [facility] location on 6/11/2020 with her verbal consent for follow up. She denies any change in symptoms since her last office visit. Pt notes intermittently increased lower back when she is more active. She reports that her pain is generally worse with colder weather. Pt takes Tylenol #3 and takes 1-2 tabs daily as her pain warrants with benefit. She denies any side effects with the medication. MME is 4.5-9 and she keeps her medication secure. Pt at this time desires to continue with current care. Pain Scale: /10    PCP: Lisy Rivero DO     Past Medical History:   Diagnosis Date    Anemia NEC     Aneurysm ([facility] [address]     left kidney    Arthritis     Asthma     Cardiac catheterization 02/23/2015    R dominant. LM patent. mLAD 20%. CX patent. mRCA 20%. LVEDP 8.  EF 60%. Mild AS. RA 3/1. PA 24. W 6/5.  CI 2.94.    Cardiac echocardiogram 07/27/2016    EF 60-65%. No WMA. Mild LVH. Normal LV diastolic fx. Mild LAE. Mod-severe AS (mean grad 34 mmHg). AS has worsened since study of 1/20/15.   Mild MR.      Cardiac nuclear imaging test 02/10/2015    No evidence of ischemia or prior infarction. Hyperdynamic LV fx.  EF >70%. No RWMA. Nondiagnostic EKG on pharm stress test.  Low risk.  Carotid duplex 01/12/2016    Mild < 50% bilateral ICA stenosis.       Chest tightness     exertional, in the setting of widely patent coronary arteries, possibly related to elevated left ventricular end-diastolic pressure or possibly to small-vessel disease, improved on Cardizem CD in place of Verapamil    Chronic kidney disease, stage III (moderate) (HCC)     Essential hypertension     Glaucoma     Heart murmur     Hypercholesterolemia     Hypoglycemia     Raynaud's syndrome     Renal cell carcinoma (HCC)     Stage T1a Furhman Grade 2 s/p right open partial nephrectomy 2/22/10      S/p nephrectomy 2/22/10    right    Scoliosis     Stroke [facility]) 2/2015    resid los of balance        Social History     Socioeconomic History    Marital status:      Spouse name: Not on file    Number of children: Not on file    Years of education: Not on file    Highest education level: Not on file   Occupational History    Not on file   Social Needs    Financial resource strain: Not on file    Food insecurity     Worry: Not on file     Inability: Not on file    Transportation needs     Medical: Not on file     Non-medical: Not on file   Tobacco Use    Smoking status: Never Smoker    Smokeless tobacco: Never Used   Substance and Sexual Activity    Alcohol use: No     Alcohol/week: 0.0 standard drinks    Drug use: No    Sexual activity: Yes     Partners: Male   Lifestyle    Physical activity     Days per week: Not on file     Minutes per session: Not on file    Stress: Not on file   Relationships    Social connections     Talks on phone: Not on file     Gets together: Not on file     Attends [employer] service: Not on file     Active member of club or organization: Not on file     Attends meetings of clubs or organizations: Not on file     Relationship status: Not on file    Intimate partner violence     Fear of current or ex partner: Not on file     Emotionally abused: Not on file     Physically abused: Not on file     Forced sexual activity: Not on file   Other Topics Concern    Not on file   Social History Narrative    Not on file       Current Outpatient Medications   Medication Sig Dispense Refill    [START ON 6/20/2020] acetaminophen-codeine (TYLENOL #3) 300-30 mg per tablet Take 1 Tab by mouth as needed (BID prn chronic pain) for up to 30 days. Max Daily Amount: 2 Tabs. 60 Tab 0    cyanocobalamin (VITAMIN B12) 100 mcg tablet Take 100 mcg by mouth daily.  losartan (COZAAR) 100 mg tablet Take 1 Tab by mouth daily. 90 Tab 3    furosemide (LASIX) 20 mg tablet Take 20 mg by mouth daily.  amLODIPine (NORVASC) 5 mg tablet Take 5 mg by mouth daily.  LINZESS 145 mcg cap capsule Take 145 mcg by mouth daily.  aspirin delayed-release 81 mg tablet Take 81 mg by mouth daily.  atorvastatin (LIPITOR) 80 mg tablet Take 1 Tab by mouth daily. 90 Tab 3    Cholecalciferol, Vitamin D3, 50,000 unit cap Take 1 Cap by mouth every seven (7) days.  PROPYLENE GLYCOL/ (SYSTANE OP) Apply  to eye as needed.  albuterol (PROVENTIL HFA, VENTOLIN HFA) 90 mcg/actuation inhaler Take 2 Puffs by inhalation as needed.  cetirizine (ZYRTEC) 10 mg tablet Take 10 mg by mouth as needed. Allergies   Allergen Reactions    Vicodin [Hydrocodone-Acetaminophen] Nausea and Vomiting         REVIEW OF SYSTEMS    Constitutional: Negative for fever, chills, or weight change. Respiratory: Negative for cough or shortness of breath. Cardiovascular: Negative for chest pain or palpitations. Gastrointestinal: Negative for acid reflux, change in bowel habits, or constipation. Musculoskeletal: Positive for lumbar pain. Neurological: Negative for headaches, dizziness, or numbness.   Psychiatric/Behavioral: Negative for difficulty with sleep. IMAGING:    Lumbar spine MRI from 03/16/2016 was personally reviewed with the patient and demonstrated:  Results from [facility] on 03/16/16   MRI LUMB SPINE W WO CONT     Narrative MRI lumbar spine with and without contrast    COMPARISON: CT chest abdomen and pelvis October 6, 2011    CLINICAL INFORMATION: Progressive back pain, left leg pain. PROCEDURE:    MRI of the lumbar spine was performed prior to and following the uneventful  administration of 11 cc of Magnevist venous leak. Sequences included: Localizer,  sagittal T1, sagittal T1 postcontrast with fat saturation, sagittal inversion  recovery, sagittal T2, axial T1, axial T1 postcontrast, and axial T2    FINDINGS:  There is mild apex right scoliosis of the lumbar spine, with mild apex left  scoliosis of the thoracolumbar junction. 4 mm of grade 1 anterior listhesis of  L4 on L5. Vertebral body heights are preserved. No fracture. Minimal multilevel  small osteophyte formation. Mild disc narrowing and desiccation throughout the lumbar spine. Conus terminates at the L1-2 level. Mild degenerative changes present in the sacroiliac joints. Patient has  undergone previous partial left nephrectomy. High T2 signal foci in the kidneys,  incompletely evaluated on this study. High T2 signal focus in the posterior  right hepatic lobe is also possibly a cyst, but incompletely evaluated. 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       Correlation of axial and sagittal images reveals the following:    At L1-L2: No significant disc pathology or proliferative changes. No central  canal or foraminal stenosis. At L2-L3: Mild bulging of the posterolateral disc corners, with mild facet  arthropathy and ligamentous buckling. Trace facet joint effusions. Canal is  patent. Mild right greater than left foraminal narrowing.     At L3-L4: Mild bulging of the posterolateral disc corners, with mild facet  arthropathy and ligamentous buckling. Trace right facet joint effusion. Canal is  patent. Mild bilateral foraminal narrowing. At L4-L5: Uncovering of the disc due to the listhesis, with diffuse disc bulge. Mild to moderate facet arthropathy. Canal is patent.  Susceptibility artifact in  the posterior soft tissues. Mild bilateral foraminal narrowing. Question prior  left hemilaminectomy. At L5-S1: Small posterior disc bulge, with mild facet arthropathy. Canal is  patent. No significant foraminal narrowing.              Impression IMPRESSION:    Thoracolumbar scoliosis. Grade 1 anterior listhesis of L4 on L5. Multilevel  degenerative changes, with overall patent canal. Multilevel mild foraminal  narrowing. Question prior left hemilaminectomy at L4. High T2 signal foci in the kidneys, incompletely evaluated on this study. High  T2 signal focus in the posterior right hepatic lobe is also possibly a cyst, but  incompletely evaluated.      Written by Massiel Chavez, as dictated by Ham Villalba MD.  I, Dr. Ham Villalba confirm that all documentation is accurate.

## 2020-06-15 ENCOUNTER — HOSPITAL ENCOUNTER (OUTPATIENT)
Dept: CT IMAGING | Age: 80
Discharge: HOME | End: 2020-06-15
Attending: FAMILY MEDICINE
Payer: MEDICARE

## 2020-06-15 DIAGNOSIS — I72.2 ANEURYSM OF RENAL ARTERY (HCC): ICD-10-CM

## 2020-06-15 LAB — CREAT UR-MCNC: 0.8 MG/DL (ref 0.6–1.3)

## 2020-06-15 PROCEDURE — 74011636320 HC RX REV CODE- 636/320

## 2020-06-15 PROCEDURE — 82565 ASSAY OF CREATININE: CPT

## 2020-06-15 PROCEDURE — 74175 CTA ABDOMEN W/CONTRAST: CPT

## 2020-06-15 RX ADMIN — IOPAMIDOL 100 ML: 755 INJECTION, SOLUTION INTRAVENOUS at 15:06

## 2020-07-21 ENCOUNTER — OFFICE VISIT (OUTPATIENT)
Dept: CARDIOLOGY CLINIC | Age: 80
End: 2020-07-21

## 2020-07-21 VITALS
SYSTOLIC BLOOD PRESSURE: 142 MMHG | OXYGEN SATURATION: 98 % | BODY MASS INDEX: 23.19 KG/M2 | DIASTOLIC BLOOD PRESSURE: 66 MMHG | WEIGHT: 126 LBS | HEART RATE: 82 BPM | HEIGHT: 62 IN

## 2020-07-21 DIAGNOSIS — I35.0 AORTIC VALVE STENOSIS, ETIOLOGY OF CARDIAC VALVE DISEASE UNSPECIFIED: Primary | ICD-10-CM

## 2020-07-21 DIAGNOSIS — E78.5 DYSLIPIDEMIA: ICD-10-CM

## 2020-07-21 DIAGNOSIS — I10 HYPERTENSION, UNSPECIFIED TYPE: ICD-10-CM

## 2020-07-21 NOTE — PROGRESS NOTES
HPI:  I saw Sheldon Buckley in my office today in cardiovascular evaluation regarding her aortic stenosis s/p aortic valve replacement via TAVR. Ms. Evelio Grayson is a pleasant 67 year old white female with history of chest tightness on exertion and some shortness of breath on exertion in the past, which ultimately prompted a cardiac catheterization in February of 2015, even though her nuclear myocardial perfusion study prior to that appeared to be normal. Her subsequent cardiac catheterization completed on 2/23/15 by my associate Dr. Mensah demonstrated only mild coronary artery disease with mild aortic stenosis with a peak systolic gradient of 21 mmHg across the valve and a valve area of 1.25 cm squared.       Over the years she has had some slowly increasing shortness of breath and her aortic stenosis slowly worsened to the point that it became severe noted on her echo on February 8, 2018 which demonstrated normal left ventricular systolic function in the setting of moderate to severe left ventricular hypertrophy and a mean gradient of 42 mm across the valve with an aortic valve area by VTI of 0.85 cm².     I subsequently referred her to Dr. Jacqueline Skinner at the [facility] who ultimately did a cardiac catheterization on her showing patent coronaries, but severe aortic stenosis prompting a TAVR via the right femoral artery using a 26 mm Medtronic Evolut R stent valve pleaded on March 20, 2018. She has done well since her surgery but still has some problems with dizziness when she stands too quickly which I suspect is related to autonomic insufficiency. Encounter Diagnoses   Name Primary?  Aortic valve stenosis, severe and resolved status post TAVR on March 20,2018 Yes    Hypertension, unspecified type     Dyslipidemia        Discussion: This lady is really doing quite well and I have no recommendations for change.  However, it has been over 2 years since she had her surgery and she has not had an echocardiogram in some time so I am going to redo an echocardiogram to reevaluate her bioprosthetic valve and LV function. He does have some hypercholesterolemia for which he is on Lipitor 80 mg daily and this is being managed through Dr. Trudi Adair office. She historically has had high HDLs and patent coronaries when she did have a cardiac catheterization so I suspect her risk of developing significant atherosclerosis is fairly low. We will try to get a copy of her latest lipid profile for her records. Her blood pressure is well controlled today and her EKG though abnormal is unchanged from past tracings. Since she is otherwise doing well I will simply have her seen again in a year by my associate Dr. Cas Steiner since be retired by that time. PCP:  Araceli Rosa DO       Past Medical History:   Diagnosis Date    Anemia NEC     Aneurysm ([address]     left kidney    Arthritis     Asthma     Cardiac catheterization 02/23/2015    R dominant. LM patent. mLAD 20%. CX patent. mRCA 20%. LVEDP 8.  EF 60%. Mild AS. RA 3/1. PA 24. W 6/5.  CI 2.94.    Cardiac echocardiogram 07/27/2016    EF 60-65%. No WMA. Mild LVH. Normal LV diastolic fx. Mild LAE. Mod-severe AS (mean grad 34 mmHg). AS has worsened since study of 1/20/15. Mild MR.  Cardiac nuclear imaging test 02/10/2015    No evidence of ischemia or prior infarction. Hyperdynamic LV fx.  EF >70%. No RWMA. Nondiagnostic EKG on pharm stress test.  Low risk.  Carotid duplex 01/12/2016    Mild < 50% bilateral ICA stenosis.       Chest tightness     exertional, in the setting of widely patent coronary arteries, possibly related to elevated left ventricular end-diastolic pressure or possibly to small-vessel disease, improved on Cardizem CD in place of Verapamil    Chronic kidney disease, stage III (moderate) (HCC)     Essential hypertension     Glaucoma     Heart murmur     Hypercholesterolemia     Hypoglycemia     Raynaud's syndrome     Renal cell carcinoma (HCC)     Stage T1a Furhman Grade 2 s/p right open partial nephrectomy 2/22/10      S/p nephrectomy 2/22/10    right    Scoliosis     Stroke [facility]) 2/2015    resid los of balance         Past Surgical History:   Procedure Laterality Date    COLONOSCOPY N/A 10/10/2016    COLONOSCOPY performed by Marcelo Kim [facility] at [facility] HX AORTIC VALVE REPLACEMENT Bilateral 03/2012018    not allowed to have MRI    HX AORTIC VALVE REPLACEMENT      HX CYST REMOVAL      on spine    HX GYN      partial hysterectomy 1988, on Premarin until a few years ago    HX HEART CATHETERIZATION  3/18/09    Low left-sided and right-sided filling pressures (likely related to overnight fasting). Normal resting pulmonary  arterial pressure. Normal systemic pressures. Borderline left ventricular end-diastolic pressure at rest. Mild incerease in pulmonary artery pressure and pulmonary capillary wedge pressure with exercise. Mild, hemodynamically nonsignificant epicardial coronary artery disease.  HX NEPHRECTOMY  2/22/10    s/p partial right nephrectomy for right kidney lower pole mass secondary to early stage cancer    HX OTHER SURGICAL      Ovary removal    HX TRABECULECTOMY      VASCULAR SURGERY PROCEDURE UNLIST  03/20/2018    artificial Heart Valve (Dr. Tarsha Umanzor with [facility])       Current Outpatient Medications   Medication Sig    cyanocobalamin (VITAMIN B12) 100 mcg tablet Take 100 mcg by mouth daily.  amLODIPine (NORVASC) 5 mg tablet Take 5 mg by mouth daily.  LINZESS 145 mcg cap capsule Take 145 mcg by mouth daily.  aspirin delayed-release 81 mg tablet Take 81 mg by mouth daily.  atorvastatin (LIPITOR) 80 mg tablet Take 1 Tab by mouth daily.  PROPYLENE GLYCOL/ (SYSTANE OP) Apply  to eye as needed.  albuterol (PROVENTIL HFA, VENTOLIN HFA) 90 mcg/actuation inhaler Take 2 Puffs by inhalation as needed.  cetirizine (ZYRTEC) 10 mg tablet Take 10 mg by mouth as needed.     acetaminophen-codeine (TYLENOL #3) 300-30 mg per tablet Take 1 Tab by mouth as needed (BID prn chronic pain) for up to 30 days. Max Daily Amount: 2 Tabs.  losartan (COZAAR) 100 mg tablet Take 1 Tab by mouth daily.  furosemide (LASIX) 20 mg tablet Take 20 mg by mouth daily.  Cholecalciferol, Vitamin D3, 50,000 unit cap Take 1 Cap by mouth every seven (7) days. No current facility-administered medications for this visit. Allergies   Allergen Reactions    Vicodin [Hydrocodone-Acetaminophen] Nausea and Vomiting       Social   Social History     Tobacco Use    Smoking status: Never Smoker    Smokeless tobacco: Never Used   Substance Use Topics    Alcohol use: No     Alcohol/week: 0.0 standard drinks         Family history: family history includes COPD in her mother; Cancer in her father; Heart Disease in her brother; Thyroid Disease in her mother. Review of Systems:  Constitutional: Negative. Respiratory: Negative. Cardiovascular: Negative. Gastrointestinal: Positive for constipation. Negative for abdominal pain, blood in stool, diarrhea, heartburn, melena, nausea and vomiting. Musculoskeletal: Positive for joint pain. Negative for falls and myalgias. Neurological: Negative for dizziness. Physical Exam:   The patient is an alert, oriented, well developed, well nourished 68 y.o.  female who was in no acute distress at the time of my examination. Visit Vitals  /66   Pulse 82   Ht 5' 2\" (1.575 m)   Wt 126 lb (57.2 kg)   SpO2 98%   BMI 23.05 kg/m²        BP Readings from Last 3 Encounters:   07/21/20 142/66   01/09/20 165/71   12/12/19 140/78        Wt Readings from Last 3 Encounters:   07/21/20 126 lb (57.2 kg)   04/07/20 117 lb (53.1 kg)   01/09/20 118 lb 12.8 oz (53.9 kg)       HEENT: Conjunctivae white, mucosa moist, no pallor or cyanosis. Neck: Supple without masses, tenderness or thyromegaly. No jugular venous distention.   Carotid upstrokes are full bilaterally with bilateral bruits vs radiation of murmur to the neck. Cardiovascular: Chest is symmetrical with good excursion. Addison is not displaced. No lifts, heaves or thrills. There is a normal S1 and S2 with a grade 1/VI soft JENNY along the LSB, with radiation to the base without appreciable diastolic murmur, rubs, clicks or gallops. Lungs: Clear to auscultation in all fields. Abdomen: Soft. No masses, tenderness or organomegaly. Extremities: No edema, with full peripheral pulses. Review of Data: Please refer to past medical history for most recent cardiac testing. Lab Results   Component Value Date/Time    Cholesterol, total 160 02/21/2015 02:02 AM    HDL Cholesterol 73 (H) 02/21/2015 02:02 AM    LDL, calculated 76.6 02/21/2015 02:02 AM    Triglyceride 52 02/21/2015 02:02 AM    CHOL/HDL Ratio 2.2 02/21/2015 02:02 AM       Results for orders placed or performed in visit on 07/21/20   AMB POC EKG ROUTINE W/ 12 LEADS, INTER & REP     Status: None    Narrative    Normal sinus rhythm rate 82. There is slight ST depression inferolaterally which could reflect ischemia but is really unchanged from past tracings. There is also early R wave transition anteriorly and low voltage in the precordial leads. This is similar to the EKG of August 12, 2019. Laila Arevalo D.O., F.A.C.C. Cardiovascular Specialists  [facility] [place]  [address] [place] [address]. [address]    PLEASE NOTE:  This document has been produced using voice recognition software. Unrecognized errors in transcription may be present.

## 2020-07-30 ENCOUNTER — HOSPITAL ENCOUNTER (OUTPATIENT)
Dept: NON INVASIVE DIAGNOSTICS | Age: 80
Discharge: HOME | End: 2020-07-30
Attending: INTERNAL MEDICINE
Payer: MEDICARE

## 2020-07-30 VITALS
SYSTOLIC BLOOD PRESSURE: 142 MMHG | HEIGHT: 62 IN | BODY MASS INDEX: 23.19 KG/M2 | DIASTOLIC BLOOD PRESSURE: 66 MMHG | WEIGHT: 126 LBS

## 2020-07-30 DIAGNOSIS — I35.0 AORTIC VALVE STENOSIS, ETIOLOGY OF CARDIAC VALVE DISEASE UNSPECIFIED: ICD-10-CM

## 2020-07-30 LAB
AV VELOCITY RATIO: 0.57
AV VTI RATIO: 0.6
ECHO AO ROOT DIAM: 2.23 CM
ECHO AV AREA PEAK VELOCITY: 1.6 CM2
ECHO AV AREA VTI: 1.7 CM2
ECHO AV AREA/BSA PEAK VELOCITY: 1 CM2/M2
ECHO AV AREA/BSA VTI: 1.1 CM2/M2
ECHO AV MEAN GRADIENT: 5.2 MMHG
ECHO AV MEAN VELOCITY: 1.05 M/S
ECHO AV PEAK GRADIENT: 10.4 MMHG
ECHO AV PEAK VELOCITY: 161.09 CM/S
ECHO AV VTI: 38.66 CM
ECHO LA AREA 4C: 18.6 CM2
ECHO LA VOL 2C: 28.95 ML (ref 22–52)
ECHO LA VOL 4C: 56.8 ML (ref 22–52)
ECHO LA VOL BP: 42.39 ML (ref 22–52)
ECHO LA VOL/BSA BIPLANE: 26.99 ML/M2 (ref 16–28)
ECHO LA VOLUME INDEX A2C: 18.43 ML/M2 (ref 16–28)
ECHO LA VOLUME INDEX A4C: 36.16 ML/M2 (ref 16–28)
ECHO LV E' LATERAL VELOCITY: 8.18 CM/S
ECHO LV E' SEPTAL VELOCITY: 9.04 CM/S
ECHO LV EDV TEICHHOLZ: 0.43 ML
ECHO LV ESV TEICHHOLZ: 0.17 ML
ECHO LV INTERNAL DIMENSION DIASTOLIC: 3.96 CM (ref 3.9–5.3)
ECHO LV INTERNAL DIMENSION SYSTOLIC: 2.67 CM
ECHO LV IVSD: 1.12 CM (ref 0.6–0.9)
ECHO LV MASS 2D: 135 G (ref 67–162)
ECHO LV MASS INDEX 2D: 86 G/M2 (ref 43–95)
ECHO LV POSTERIOR WALL DIASTOLIC: 0.99 CM (ref 0.6–0.9)
ECHO LVOT DIAM: 1.92 CM
ECHO LVOT PEAK GRADIENT: 3.3 MMHG
ECHO LVOT PEAK VELOCITY: 91.27 CM/S
ECHO LVOT SV: 67.4 ML
ECHO LVOT VTI: 23.33 CM
ECHO MV A VELOCITY: 57.35 CM/S
ECHO MV E DECELERATION TIME (DT): 201.9 MS
ECHO MV E VELOCITY: 97.8 CM/S
ECHO MV E/A RATIO: 1.71
ECHO MV E/E' LATERAL: 11.96
ECHO MV E/E' RATIO (AVERAGED): 11.39
ECHO MV E/E' SEPTAL: 10.82
ECHO RV TAPSE: 1.65 CM (ref 1.5–2)
ECHO TV REGURGITANT MAX VELOCITY: 233.01 CM/S
ECHO TV REGURGITANT PEAK GRADIENT: 21.7 MMHG
LVFS 2D: 32.57 %
LVOT MG: 1.75 MMHG
LVOT MV: 0.61 CM/S
LVSV (TEICH): 26.9 ML
MV DEC SLOPE: 4.84

## 2020-07-30 PROCEDURE — 93306 TTE W/DOPPLER COMPLETE: CPT

## 2020-08-03 ENCOUNTER — TELEPHONE (OUTPATIENT)
Dept: CARDIOLOGY CLINIC | Age: 80
End: 2020-08-03

## 2020-08-03 NOTE — TELEPHONE ENCOUNTER
I called and talked to the patient about her echocardiogram report. Her echocardiogram appears to be completely normal with normal left ventricular function and normal bioprosthetic valve in aortic position. We will simply continue on her current medical therapy.  ES

## 2020-08-17 DIAGNOSIS — Z79.891 USE OF OPIATES FOR THERAPEUTIC PURPOSES: ICD-10-CM

## 2020-08-17 DIAGNOSIS — G89.29 OTHER CHRONIC PAIN: ICD-10-CM

## 2020-08-17 DIAGNOSIS — M47.816 LUMBAR FACET ARTHROPATHY: ICD-10-CM

## 2020-08-17 RX ORDER — ACETAMINOPHEN AND CODEINE PHOSPHATE 300; 30 MG/1; MG/1
TABLET ORAL
Qty: 60 TAB | Refills: 1 | Status: ON HOLD | OUTPATIENT
Start: 2020-08-17 | End: 2021-11-03

## 2020-08-19 NOTE — TELEPHONE ENCOUNTER
Returned call to patient, Reached unidentified voicemail, left message, identified myself/facility/callback number, informed of refill being sent, requested return call to facility with any further questions/concerns.

## 2020-09-03 ENCOUNTER — OFFICE VISIT (OUTPATIENT)
Dept: ORTHOPEDIC SURGERY | Age: 80
End: 2020-09-03

## 2020-09-03 VITALS
RESPIRATION RATE: 16 BRPM | BODY MASS INDEX: 22.89 KG/M2 | HEART RATE: 74 BPM | TEMPERATURE: 97.5 F | DIASTOLIC BLOOD PRESSURE: 79 MMHG | WEIGHT: 124.4 LBS | SYSTOLIC BLOOD PRESSURE: 146 MMHG | HEIGHT: 62 IN | OXYGEN SATURATION: 99 %

## 2020-09-03 DIAGNOSIS — G89.29 OTHER CHRONIC PAIN: ICD-10-CM

## 2020-09-03 DIAGNOSIS — Z90.5 S/P NEPHRECTOMY: ICD-10-CM

## 2020-09-03 DIAGNOSIS — N18.30 CHRONIC KIDNEY DISEASE, STAGE III (MODERATE) (HCC): ICD-10-CM

## 2020-09-03 DIAGNOSIS — Z79.891 USE OF OPIATES FOR THERAPEUTIC PURPOSES: ICD-10-CM

## 2020-09-03 DIAGNOSIS — M47.816 LUMBAR FACET ARTHROPATHY: ICD-10-CM

## 2020-09-03 DIAGNOSIS — M25.511 RIGHT SHOULDER PAIN, UNSPECIFIED CHRONICITY: ICD-10-CM

## 2020-09-03 DIAGNOSIS — G89.29 OTHER CHRONIC PAIN: Primary | ICD-10-CM

## 2020-09-03 DIAGNOSIS — R29.898 RIGHT ARM WEAKNESS: ICD-10-CM

## 2020-09-03 RX ORDER — ACETAMINOPHEN AND CODEINE PHOSPHATE 300; 30 MG/1; MG/1
1 TABLET ORAL AS NEEDED
Qty: 90 TAB | Refills: 2 | Status: SHIPPED | OUTPATIENT
Start: 2020-09-16 | End: 2020-12-03 | Stop reason: SDUPTHER

## 2020-09-03 NOTE — PATIENT INSTRUCTIONS
Rotator Cuff: Exercises Introduction Here are some examples of exercises for you to try. The exercises may be suggested for a condition or for rehabilitation. Start each exercise slowly. Ease off the exercises if you start to have pain. You will be told when to start these exercises and which ones will work best for you. How to do the exercises Pendulum swing If you have pain in your back, do not do this exercise. 1. Hold on to a table or the back of a chair with your good arm. Then bend forward a little and let your sore arm hang straight down. This exercise does not use the arm muscles. Rather, use your legs and your hips to create movement that makes your arm swing freely. 2. Use the movement from your hips and legs to guide the slightly swinging arm back and forth like a pendulum (or elephant trunk). Then guide it in circles that start small (about the size of a dinner plate). Make the circles a bit larger each day, as your pain allows. 3. Do this exercise for 5 minutes, 5 to 7 times each day. 4. As you have less pain, try bending over a little farther to do this exercise. This will increase the amount of movement at your shoulder. Posterior stretching exercise 1. Hold the elbow of your injured arm with your other hand. 2. Use your hand to pull your injured arm gently up and across your body. You will feel a gentle stretch across the back of your injured shoulder. 3. Hold for at least 15 to 30 seconds. Then slowly lower your arm. 4. Repeat 2 to 4 times. Up-the-back stretch Your doctor or physical therapist may want you to wait to do this stretch until you have regained most of your range of motion and strength. You can do this stretch in different ways. Hold any of these stretches for at least 15 to 30 seconds. Repeat them 2 to 4 times. 1. Light stretch: Put your hand in your back pocket. Let it rest there to stretch your shoulder. 2. Moderate stretch: With your other hand, hold your injured arm (palm outward) behind your back by the wrist. Pull your arm up gently to stretch your shoulder. 3. Advanced stretch: Put a towel over your other shoulder. Put the hand of your injured arm behind your back. Now hold the back end of the towel. With the other hand, hold the front end of the towel in front of your body. Pull gently on the front end of the towel. This will bring your hand farther up your back to stretch your shoulder. Overhead stretch 1. Standing about an arm's length away, grasp onto a solid surface. You could use a countertop, a doorknob, or the back of a sturdy chair. 2. With your knees slightly bent, bend forward with your arms straight. Lower your upper body, and let your shoulders stretch. 3. As your shoulders are able to stretch farther, you may need to take a step or two backward. 4. Hold for at least 15 to 30 seconds. Then stand up and relax. If you had stepped back during your stretch, step forward so you can keep your hands on the solid surface. 5. Repeat 2 to 4 times. Shoulder flexion (lying down) To make a wand for this exercise, use a piece of PVC pipe or a broom handle with the broom removed. Make the wand about a foot wider than your shoulders. 1. Lie on your back, holding a wand with both hands. Your palms should face down as you hold the wand. 2. Keeping your elbows straight, slowly raise your arms over your head. Raise them until you feel a stretch in your shoulders, upper back, and chest. 
3. Hold for 15 to 30 seconds. 4. Repeat 2 to 4 times. Shoulder rotation (lying down) To make a wand for this exercise, use a piece of PVC pipe or a broom handle with the broom removed. Make the wand about a foot wider than your shoulders. 1. Lie on your back. Hold a wand with both hands with your elbows bent and palms up.  
2. Keep your elbows close to your body, and move the wand across your body toward the sore arm. 3. Hold for 8 to 12 seconds. 4. Repeat 2 to 4 times. Wall climbing (to the side) Avoid any movement that is straight to your side, and be careful not to arch your back. Your arm should stay about 30 degrees to the front of your side. 1. Stand with your side to a wall so that your fingers can just touch it at an angle about 30 degrees toward the front of your body. 2. Walk the fingers of your injured arm up the wall as high as pain permits. Try not to shrug your shoulder up toward your ear as you move your arm up. 3. Hold that position for a count of at least 15 to 20. 
4. Walk your fingers back down to the starting position. 5. Repeat at least 2 to 4 times. Try to reach higher each time. Wall climbing (to the front) During this stretching exercise, be careful not to arch your back. 1. Face a wall, and stand so your fingers can just touch it. 2. Keeping your shoulder down, walk the fingers of your injured arm up the wall as high as pain permits. (Don't shrug your shoulder up toward your ear.) 3. Hold your arm in that position for at least 15 to 30 seconds. 4. Slowly walk your fingers back down to where you started. 5. Repeat at least 2 to 4 times. Try to reach higher each time. Shoulder blade squeeze 1. Stand with your arms at your sides, and squeeze your shoulder blades together. Do not raise your shoulders up as you squeeze. 2. Hold 6 seconds. 3. Repeat 8 to 12 times. Scapular exercise: Arm reach 1. Lie flat on your back. This exercise is a very slight motion that starts with your arms raised (elbows straight, arms straight). 2. From this position, reach higher toward the bright or ceiling. Keep your elbows straight. All motion should be from your shoulder blade only. 3. Relax your arms back to where you started. 4. Repeat 8 to 12 times. Arm raise to the side During this strengthening exercise, your arm should stay about 30 degrees to the front of your side. 1. Slowly raise your injured arm to the side, with your thumb facing up. Raise your arm 60 degrees at the most (shoulder level is 90 degrees). 2. Hold the position for 3 to 5 seconds. Then lower your arm back to your side. If you need to, bring your \"good\" arm across your body and place it under the elbow as you lower your injured arm. Use your good arm to keep your injured arm from dropping down too fast. 
3. Repeat 8 to 12 times. 4. When you first start out, don't hold any extra weight in your hand. As you get stronger, you may use a 1-pound to 2-pound dumbbell or a small can of food. Shoulder flexor and extensor exercise These are isometric exercises. That means you contract your muscles without actually moving. 1. Push forward (flex): Stand facing a wall or doorjamb, about 6 inches or less back. Hold your injured arm against your body. Make a closed fist with your thumb on top. Then gently push your hand forward into the wall with about 25% to 50% of your strength. Don't let your body move backward as you push. Hold for about 6 seconds. Relax for a few seconds. Repeat 8 to 12 times. 2. Push backward (extend): Stand with your back flat against a wall. Your upper arm should be against the wall, with your elbow bent 90 degrees (your hand straight ahead). Push your elbow gently back against the wall with about 25% to 50% of your strength. Don't let your body move forward as you push. Hold for about 6 seconds. Relax for a few seconds. Repeat 8 to 12 times. Scapular exercise: Wall push-ups This exercise is best done with your fingers somewhat turned out, rather than straight up and down. 1. Stand facing a wall, about 12 inches to 18 inches away. 2. Place your hands on the wall at shoulder height. 3. Slowly bend your elbows and bring your face to the wall. Keep your back and hips straight. 4. Push back to where you started. 5. Repeat 8 to 12 times. 6. When you can do this exercise against a wall comfortably, you can try it against a counter. You can then slowly progress to the end of a couch, then to a sturdy chair, and finally to the floor. Scapular exercise: Retraction For this exercise, you will need elastic exercise material, such as surgical tubing or Thera-Band. 1. Put the band around a solid object at about waist level. (A bedpost will work well.) Each hand should hold an end of the band. 2. With your elbows at your sides and bent to 90 degrees, pull the band back. Your shoulder blades should move toward each other. Then move your arms back where you started. 3. Repeat 8 to 12 times. 4. If you have good range of motion in your shoulders, try this exercise with your arms lifted out to the sides. Keep your elbows at a 90-degree angle. Raise the elastic band up to about shoulder level. Pull the band back to move your shoulder blades toward each other. Then move your arms back where you started. Internal rotator strengthening exercise 1. Start by tying a piece of elastic exercise material to a doorknob. You can use surgical tubing or Thera-Band. 2. Stand or sit with your shoulder relaxed and your elbow bent 90 degrees. Your upper arm should rest comfortably against your side. Squeeze a rolled towel between your elbow and your body for comfort. This will help keep your arm at your side. 3. Hold one end of the elastic band in the hand of the painful arm. 4. Slowly rotate your forearm toward your body until it touches your belly. Slowly move it back to where you started. 5. Keep your elbow and upper arm firmly tucked against the towel roll or at your side. 6. Repeat 8 to 12 times. External rotator strengthening exercise 1. Start by tying a piece of elastic exercise material to a doorknob. You can use surgical tubing or Thera-Band. (You may also hold one end of the band in each hand.) 2. Stand or sit with your shoulder relaxed and your elbow bent 90 degrees. Your upper arm should rest comfortably against your side. Squeeze a rolled towel between your elbow and your body for comfort. This will help keep your arm at your side. 3. Hold one end of the elastic band with the hand of the painful arm. 4. Start with your forearm across your belly. Slowly rotate the forearm out away from your body. Keep your elbow and upper arm tucked against the towel roll or the side of your body until you begin to feel tightness in your shoulder. Slowly move your arm back to where you started. 5. Repeat 8 to 12 times. Follow-up care is a key part of your treatment and safety. Be sure to make and go to all appointments, and call your doctor if you are having problems. It's also a good idea to know your test results and keep a list of the medicines you take. Where can you learn more? Go to http://meena-juan r.info/ Enter Xochitl Pascual in the search box to learn more about \"Rotator Cuff: Exercises. \" Current as of: March 2, 2020               Content Version: 12.5 © 2466-7757 [employer]. Care instructions adapted under license by [employer] (which disclaims liability or warranty for this information). If you have questions about a medical condition or this instruction, always ask your healthcare professional. [address] any warranty or liability for your use of this information.

## 2020-09-03 NOTE — LETTER
9/4/20 Patient: Josiane Mackay YOB: 1940 Date of Visit: 9/3/2020 Pat Wan, [facility] [place] [address] VIA Facsimile: [phone] Dear Pat Wan DO, Thank you for referring Ms. Estella Hoang to [facility] for evaluation. My notes for this consultation are attached. If you have questions, please do not hesitate to call me. I look forward to following your patient along with you. Sincerely, Ewelina Thompson MD

## 2020-09-03 NOTE — PROGRESS NOTES
[facility]  [address]ero[address] [place] [address], [facility] [address]  Phone: [phone]  Fax: [phone]    Pt's YOB: 1940    ASSESSMENT   Diagnoses and all orders for this visit:    1. Other chronic pain  -     acetaminophen-codeine (TYLENOL #3) 300-30 mg per tablet; Take 1 Tab by mouth as needed (TID prn chronic pain) for up to 30 days. Max Daily Amount: 2 Tabs. -     DRUG SCREEN UR - W/ CONFIRM; Future    2. Use of opiates for therapeutic purposes  -     acetaminophen-codeine (TYLENOL #3) 300-30 mg per tablet; Take 1 Tab by mouth as needed (TID prn chronic pain) for up to 30 days. Max Daily Amount: 2 Tabs. -     DRUG SCREEN UR - W/ CONFIRM; Future    3. Right shoulder pain, unspecified chronicity  -     XR SHOULDER RT AP/LAT MIN 2 V; Future    4. Right arm weakness    5. Lumbar facet arthropathy  -     acetaminophen-codeine (TYLENOL #3) 300-30 mg per tablet; Take 1 Tab by mouth as needed (TID prn chronic pain) for up to 30 days. Max Daily Amount: 2 Tabs. 6. Chronic kidney disease, stage III (moderate) ([facility])    7. S/p right nephrectomy for cancer in 1/10         IMPRESSION AND PLAN:  Ralph Murillo is a 68 y.o. female with history of chronic lumbar pain. Pt complains of pain in the right shoulder when she helped her  lift something at the beginning of the summer. She takes Tylenol #3 1-3 tabs daily as his pain warrants. 1) Pt was given information on rotator cuff exercises. 2) She received a refill of Tylenol #3 1 tab TID prn chronic pain. Of note, patient's MME/day is 9-13.5. 3) Pt is not a candidate for NSAID's due to renal disease and a partial nephrectomy for cancer. 4) She was offered a referral to right shoulder physical therapy but deferred. 5) Right shoulder x-rays were ordered. 6) A UDS was obtained. 7) I recommended the patient try OTC Aspercaine patches or Voltaren gel if needed.    8) Ms. Riki Rangel has a reminder for a \"due or due soon\" health maintenance. I have asked that she contact her primary care provider, Kim Rizo DO, for follow-up on this health maintenance. 9)  demonstrated consistency with prescribing. 10) Last UDS from 4/4/2019 was consistent. Follow-up and Dispositions    · Return in about 3 months (around 12/3/2020) for Medication follow up, Diagnostic Test follow up. HISTORY OF PRESENT ILLNESS:  Yvette Vu is a 68 y.o. female with history of chronic lumbar pain and presents to the office today for follow up. Pt notes pain in the right shoulder when she helped her  lift something at the beginning of the summer. She admits to difficulty sleeping on her right side secondary to pain. Pt admits that her lower back pain is generally more severe during the colder months. She takes Tylenol #3 1-3 tabs daily as his pain warrants with benefit. Pt at this time desires to continue with current care. Pain Scale: 4/10    PCP: Kim Rizo DO     Past Medical History:   Diagnosis Date    Anemia NEC     Aneurysm ([address]     left kidney    Arthritis     Asthma     Cardiac catheterization 02/23/2015    R dominant. LM patent. mLAD 20%. CX patent. mRCA 20%. LVEDP 8.  EF 60%. Mild AS. RA 3/1. PA 24. W 6/5.  CI 2.94.    Cardiac echocardiogram 07/27/2016    EF 60-65%. No WMA. Mild LVH. Normal LV diastolic fx. Mild LAE. Mod-severe AS (mean grad 34 mmHg). AS has worsened since study of 1/20/15. Mild MR.  Cardiac nuclear imaging test 02/10/2015    No evidence of ischemia or prior infarction. Hyperdynamic LV fx.  EF >70%. No RWMA. Nondiagnostic EKG on pharm stress test.  Low risk.  Carotid duplex 01/12/2016    Mild < 50% bilateral ICA stenosis.       Chest tightness     exertional, in the setting of widely patent coronary arteries, possibly related to elevated left ventricular end-diastolic pressure or possibly to small-vessel disease, improved on Cardizem CD in place of Verapamil  Chronic kidney disease, stage III (moderate) (HCC)     Essential hypertension     Glaucoma     Heart murmur     Hypercholesterolemia     Hypoglycemia     Raynaud's syndrome     Renal cell carcinoma (HCC)     Stage T1a Furhman Grade 2 s/p right open partial nephrectomy 2/22/10      S/p nephrectomy 2/22/10    right    Scoliosis     Stroke [facility]) 2/2015    resid los of balance        Social History     Socioeconomic History    Marital status:      Spouse name: Not on file    Number of children: Not on file    Years of education: Not on file    Highest education level: Not on file   Occupational History    Not on file   Social Needs    Financial resource strain: Not on file    Food insecurity     Worry: Not on file     Inability: Not on file    Transportation needs     Medical: Not on file     Non-medical: Not on file   Tobacco Use    Smoking status: Never Smoker    Smokeless tobacco: Never Used   Substance and Sexual Activity    Alcohol use: No     Alcohol/week: 0.0 standard drinks    Drug use: No    Sexual activity: Yes     Partners: Male   Lifestyle    Physical activity     Days per week: Not on file     Minutes per session: Not on file    Stress: Not on file   Relationships    Social connections     Talks on phone: Not on file     Gets together: Not on file     Attends Taoist service: Not on file     Active member of club or organization: Not on file     Attends meetings of clubs or organizations: Not on file     Relationship status: Not on file    Intimate partner violence     Fear of current or ex partner: Not on file     Emotionally abused: Not on file     Physically abused: Not on file     Forced sexual activity: Not on file   Other Topics Concern    Not on file   Social History Narrative    Not on file       Current Outpatient Medications   Medication Sig Dispense Refill    [START ON 9/16/2020] acetaminophen-codeine (TYLENOL #3) 300-30 mg per tablet Take 1 Tab by mouth as needed (TID prn chronic pain) for up to 30 days. Max Daily Amount: 2 Tabs. 90 Tab 2    acetaminophen-codeine (TYLENOL #3) 300-30 mg per tablet TAKE 1 TABLET BY MOUTH AS NEEDED(BID AS NEEDED CHRONIC PAIN) FOR UP TO 30 DAYS 60 Tab 1    cyanocobalamin (VITAMIN B12) 100 mcg tablet Take 100 mcg by mouth daily.  losartan (COZAAR) 100 mg tablet Take 1 Tab by mouth daily. 90 Tab 3    furosemide (LASIX) 20 mg tablet Take 20 mg by mouth daily.  amLODIPine (NORVASC) 5 mg tablet Take 5 mg by mouth daily.  LINZESS 145 mcg cap capsule Take 145 mcg by mouth daily.  aspirin delayed-release 81 mg tablet Take 81 mg by mouth daily.  atorvastatin (LIPITOR) 80 mg tablet Take 1 Tab by mouth daily. 90 Tab 3    Cholecalciferol, Vitamin D3, 50,000 unit cap Take 1 Cap by mouth every seven (7) days.  PROPYLENE GLYCOL/ (SYSTANE OP) Apply  to eye as needed.  albuterol (PROVENTIL HFA, VENTOLIN HFA) 90 mcg/actuation inhaler Take 2 Puffs by inhalation as needed.  cetirizine (ZYRTEC) 10 mg tablet Take 10 mg by mouth as needed. Allergies   Allergen Reactions    Vicodin [Hydrocodone-Acetaminophen] Nausea and Vomiting         REVIEW OF SYSTEMS    Constitutional: Negative for fever, chills, or weight change. Respiratory: Negative for cough or shortness of breath. Cardiovascular: Negative for chest pain or palpitations. Gastrointestinal: Negative for acid reflux, change in bowel habits, or constipation. Genitourinary: Negative for dysuria and flank pain. Musculoskeletal: Positive for lumbar and right shoulder pain. Skin: Negative for rash. Neurological: Negative for headaches, dizziness, or numbness  Endo/Heme/Allergies: Negative for increased bruising. Psychiatric/Behavioral: Negative for difficulty with sleep.       PHYSICAL EXAMINATION  Visit Vitals  /79 (BP 1 Location: Left arm, BP Patient Position: Sitting)   Pulse 74   Temp 97.5 °F (36.4 °C) (Skin)   Resp 16   Ht 5' 2\" (1.575 m)   Wt 124 lb 6.4 oz (56.4 kg)   SpO2 99%   BMI 22.75 kg/m²       Constitutional: Awake, alert, and in no acute distress. Neurological: 1+ symmetrical DTRs in the upper extremities. 1+ symmetrical DTRs in the lower extremities. Sensation to light touch is intact. Negative Mariano's sign bilaterally. Skin: warm, dry, and intact. Musculoskeletal: Pain with abduction of the right shoulder to 90 degrees. Positive impingement sign on the right. Positive empty can test on the right. Tenderness to palpation in the lower lumbar region. Moderate pain with extension and axial loading. No pain with internal or external rotation of her hips. Negative straight leg raise bilaterally. Hip Flex  Quads Hamstrings Ankle DF EHL Ankle PF   Right +4/5 +4/5 +4/5 +4/5 +4/5 +4/5   Left +4/5 +4/5 +4/5 +4/5 +4/5 +4/5     IMAGING:    Lumbar spine MRI from 03/16/2016 was personally reviewed with the patient and demonstrated:  Results from [facility] on 03/16/16   MRI LUMB SPINE W WO CONT     Narrative MRI lumbar spine with and without contrast    COMPARISON: CT chest abdomen and pelvis October 6, 2011    CLINICAL INFORMATION: Progressive back pain, left leg pain. PROCEDURE:    MRI of the lumbar spine was performed prior to and following the uneventful  administration of 11 cc of Magnevist venous leak. Sequences included: Localizer,  sagittal T1, sagittal T1 postcontrast with fat saturation, sagittal inversion  recovery, sagittal T2, axial T1, axial T1 postcontrast, and axial T2    FINDINGS:  There is mild apex right scoliosis of the lumbar spine, with mild apex left  scoliosis of the thoracolumbar junction. 4 mm of grade 1 anterior listhesis of  L4 on L5. Vertebral body heights are preserved. No fracture. Minimal multilevel  small osteophyte formation. Mild disc narrowing and desiccation throughout the lumbar spine. Conus terminates at the L1-2 level.     Mild degenerative changes present in the sacroiliac joints. Patient has  undergone previous partial left nephrectomy. High T2 signal foci in the kidneys,  incompletely evaluated on this study. High T2 signal focus in the posterior  right hepatic lobe is also possibly a cyst, but incompletely evaluated. 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       Correlation of axial and sagittal images reveals the following:    At L1-L2: No significant disc pathology or proliferative changes. No central  canal or foraminal stenosis. At L2-L3: Mild bulging of the posterolateral disc corners, with mild facet  arthropathy and ligamentous buckling. Trace facet joint effusions. Canal is  patent. Mild right greater than left foraminal narrowing. At L3-L4: Mild bulging of the posterolateral disc corners, with mild facet  arthropathy and ligamentous buckling. Trace right facet joint effusion. Canal is  patent. Mild bilateral foraminal narrowing. At L4-L5: Uncovering of the disc due to the listhesis, with diffuse disc bulge. Mild to moderate facet arthropathy. Canal is patent.  Susceptibility artifact in  the posterior soft tissues. Mild bilateral foraminal narrowing. Question prior  left hemilaminectomy. At L5-S1: Small posterior disc bulge, with mild facet arthropathy. Canal is  patent. No significant foraminal narrowing.              Impression IMPRESSION:    Thoracolumbar scoliosis. Grade 1 anterior listhesis of L4 on L5. Multilevel  degenerative changes, with overall patent canal. Multilevel mild foraminal  narrowing. Question prior left hemilaminectomy at L4. High T2 signal foci in the kidneys, incompletely evaluated on this study. High  T2 signal focus in the posterior right hepatic lobe is also possibly a cyst, but  incompletely evaluated.        Written by Kalen Varma, as dictated by Kiko Sharma MD.  I, Dr. Kiko Sharma confirm that all documentation is accurate.

## 2020-09-04 NOTE — PROGRESS NOTES
[facility]  [address] Rom[address] [place] [address], [facility]  Phone: [phone]  Fax: [phone]    Pt's YOB: 1940    ASSESSMENT   Diagnoses and all orders for this visit:    1. Other chronic pain  -     acetaminophen-codeine (TYLENOL #3) 300-30 mg per tablet; Take 1 Tab by mouth as needed (TID prn chronic pain) for up to 30 days. Max Daily Amount: 2 Tabs. -     DRUG SCREEN UR - W/ CONFIRM; Future    2. Use of opiates for therapeutic purposes  -     acetaminophen-codeine (TYLENOL #3) 300-30 mg per tablet; Take 1 Tab by mouth as needed (TID prn chronic pain) for up to 30 days. Max Daily Amount: 2 Tabs. -     DRUG SCREEN UR - W/ CONFIRM; Future    3. Right shoulder pain, unspecified chronicity  -     XR SHOULDER RT AP/LAT MIN 2 V; Future    4. Right arm weakness  -     XR SHOULDER RT AP/LAT MIN 2 V; Future    5. Lumbar facet arthropathy  -     acetaminophen-codeine (TYLENOL #3) 300-30 mg per tablet; Take 1 Tab by mouth as needed (TID prn chronic pain) for up to 30 days. Max Daily Amount: 2 Tabs. 6. Chronic kidney disease, stage III (moderate) (HCC)    7. S/p right nephrectomy for cancer in 1/10         IMPRESSION AND PLAN:  Jhonny Valdez is a 68 y.o. female with history of chronic lumbar pain. Pt complains of pain in the right shoulder when she helped her  lift something at the beginning of the summer. She takes Tylenol #3 1-3 tabs daily as his pain warrants. 1) Pt was given information on rotator cuff exercises. 2) She received a refill of Tylenol #3 1 tab TID prn chronic pain. Of note, patient's MME/day is 9-13.5. 3) Pt is not a candidate for NSAID's due to renal disease and a partial nephrectomy for cancer. 4) She was offered a referral to right shoulder physical therapy but deferred. 5) Right shoulder x-rays were ordered. 6) A UDS was obtained.    7) I recommended the patient try OTC Aspercaine patches or Voltaren gel if needed. 8) Ms. [facility] has a reminder for a \"due or due soon\" health maintenance. I have asked that she contact her primary care provider, Jimmy Seth DO, for follow-up on this health maintenance. 9)  demonstrated consistency with prescribing. 10) Last UDS from 4/4/2019 was consistent. 11) Pt may follow up sooner if she would like further treatment for her right shoulder  Follow-up and Dispositions    · Return in about 3 months (around 12/3/2020) for Medication follow up, Diagnostic Test follow up. HISTORY OF PRESENT ILLNESS:  Caleb Johnston is a 68 y.o. female with history of chronic lumbar pain and presents to the office today for follow up. Pt notes pain in the right shoulder when she helped her  lift something at the beginning of the summer. She admits to difficulty sleeping on her right side secondary to pain. Pt admits that her lower back pain is generally more severe during the colder months. She takes Tylenol #3 1-3 tabs daily as his pain warrants with benefit. The medication allows her to be more functional and to do household tasks. During the summer, her pain is generally better and she usually takes 1-2 Tylenol # 3 but during the winter and colder weather, she usually needs to take up to 3 / day to be able to perform household tasks and to do her daily activities. Pt at this time desires to continue with current care. Pain Scale: 4/10    PCP: Jimmy Seth DO     Past Medical History:   Diagnosis Date    Anemia NEC     Aneurysm ([address]     left kidney    Arthritis     Asthma     Cardiac catheterization 02/23/2015    R dominant. LM patent. mLAD 20%. CX patent. mRCA 20%. LVEDP 8.  EF 60%. Mild AS. RA 3/1. PA 24. W 6/5.  CI 2.94.    Cardiac echocardiogram 07/27/2016    EF 60-65%. No WMA. Mild LVH. Normal LV diastolic fx. Mild LAE. Mod-severe AS (mean grad 34 mmHg). AS has worsened since study of 1/20/15. Mild MR.       Cardiac nuclear imaging test 02/10/2015    No evidence of ischemia or prior infarction. Hyperdynamic LV fx.  EF >70%. No RWMA. Nondiagnostic EKG on pharm stress test.  Low risk.  Carotid duplex 01/12/2016    Mild < 50% bilateral ICA stenosis.       Chest tightness     exertional, in the setting of widely patent coronary arteries, possibly related to elevated left ventricular end-diastolic pressure or possibly to small-vessel disease, improved on Cardizem CD in place of Verapamil    Chronic kidney disease, stage III (moderate) (HCC)     Essential hypertension     Glaucoma     Heart murmur     Hypercholesterolemia     Hypoglycemia     Raynaud's syndrome     Renal cell carcinoma (HCC)     Stage T1a Furhman Grade 2 s/p right open partial nephrectomy 2/22/10      S/p nephrectomy 2/22/10    right    Scoliosis     Stroke [facility]) 2/2015    resid los of balance        Social History     Socioeconomic History    Marital status:      Spouse name: Not on file    Number of children: Not on file    Years of education: Not on file    Highest education level: Not on file   Occupational History    Not on file   Social Needs    Financial resource strain: Not on file    Food insecurity     Worry: Not on file     Inability: Not on file    Transportation needs     Medical: Not on file     Non-medical: Not on file   Tobacco Use    Smoking status: Never Smoker    Smokeless tobacco: Never Used   Substance and Sexual Activity    Alcohol use: No     Alcohol/week: 0.0 standard drinks    Drug use: No    Sexual activity: Yes     Partners: Male   Lifestyle    Physical activity     Days per week: Not on file     Minutes per session: Not on file    Stress: Not on file   Relationships    Social connections     Talks on phone: Not on file     Gets together: Not on file     Attends [employer] service: Not on file     Active member of club or organization: Not on file     Attends meetings of clubs or organizations: Not on file     Relationship status: Not on file    Intimate partner violence     Fear of current or ex partner: Not on file     Emotionally abused: Not on file     Physically abused: Not on file     Forced sexual activity: Not on file   Other Topics Concern    Not on file   Social History Narrative    Not on file       Current Outpatient Medications   Medication Sig Dispense Refill    [START ON 9/16/2020] acetaminophen-codeine (TYLENOL #3) 300-30 mg per tablet Take 1 Tab by mouth as needed (TID prn chronic pain) for up to 30 days. Max Daily Amount: 2 Tabs. 90 Tab 2    acetaminophen-codeine (TYLENOL #3) 300-30 mg per tablet TAKE 1 TABLET BY MOUTH AS NEEDED(BID AS NEEDED CHRONIC PAIN) FOR UP TO 30 DAYS 60 Tab 1    cyanocobalamin (VITAMIN B12) 100 mcg tablet Take 100 mcg by mouth daily.  losartan (COZAAR) 100 mg tablet Take 1 Tab by mouth daily. 90 Tab 3    furosemide (LASIX) 20 mg tablet Take 20 mg by mouth daily.  amLODIPine (NORVASC) 5 mg tablet Take 5 mg by mouth daily.  LINZESS 145 mcg cap capsule Take 145 mcg by mouth daily.  aspirin delayed-release 81 mg tablet Take 81 mg by mouth daily.  atorvastatin (LIPITOR) 80 mg tablet Take 1 Tab by mouth daily. 90 Tab 3    Cholecalciferol, Vitamin D3, 50,000 unit cap Take 1 Cap by mouth every seven (7) days.  PROPYLENE GLYCOL/ (SYSTANE OP) Apply  to eye as needed.  albuterol (PROVENTIL HFA, VENTOLIN HFA) 90 mcg/actuation inhaler Take 2 Puffs by inhalation as needed.  cetirizine (ZYRTEC) 10 mg tablet Take 10 mg by mouth as needed. Allergies   Allergen Reactions    Vicodin [Hydrocodone-Acetaminophen] Nausea and Vomiting         REVIEW OF SYSTEMS    Constitutional: Negative for fever, chills, or weight change. Respiratory: Negative for cough or shortness of breath. Cardiovascular: Negative for chest pain or palpitations. Gastrointestinal: Negative for acid reflux, change in bowel habits, or constipation.   Genitourinary: Negative for dysuria and flank pain. Musculoskeletal: Positive for lumbar and right shoulder pain. Neurological: Negative for headaches, dizziness, or numbness  Psychiatric/Behavioral: Positive for difficulty with sleep. As per HPI    PHYSICAL EXAMINATION  Visit Vitals  /79 (BP 1 Location: Left arm, BP Patient Position: Sitting)   Pulse 74   Temp 97.5 °F (36.4 °C) (Skin)   Resp 16   Ht 5' 2\" (1.575 m)   Wt 124 lb 6.4 oz (56.4 kg)   SpO2 99%   BMI 22.75 kg/m²       Constitutional: Awake, alert, and in no acute distress. Neurological: 1+ symmetrical DTRs in the upper extremities. 1+ symmetrical DTRs in the lower extremities. Sensation to light touch is intact. Negative Mariano's sign bilaterally. Skin: warm, dry, and intact. Musculoskeletal: Pain with abduction of the right shoulder to 90 degrees. Positive impingement sign on the right. Positive empty can test on the right. Tenderness to palpation in the lower lumbar region. Moderate pain with extension and axial loading. No pain with internal or external rotation of her hips. Negative straight leg raise bilaterally. Biceps  Triceps Deltoids Wrist Ext Wrist Flex Hand Intrin   Right -4/5  4/5 -4/5  4/5  4/5  4/5   Left +4/5 +4/5 +4/5 +4/5 +4/5 +4/5        Hip Flex  Quads Hamstrings Ankle DF EHL Ankle PF   Right +4/5 +4/5 +4/5 +4/5 +4/5 +4/5   Left +4/5 +4/5 +4/5 +4/5 +4/5 +4/5     IMAGING:    Lumbar spine MRI from 03/16/2016 was personally reviewed with the patient and demonstrated:  Results [facility] on 03/16[facility]16   MRI LUMB SPINE W WO CONT     Narrative MRI lumbar spine with and without contrast    COMPARISON: CT chest abdomen and pelvis October 6, 2011    CLINICAL INFORMATION: Progressive back pain, left leg pain. PROCEDURE:    MRI of the lumbar spine was performed prior to and following the uneventful  administration of 11 cc of Magnevist venous leak.  Sequences included: Localizer,  sagittal T1, sagittal T1 postcontrast with fat saturation, sagittal inversion  recovery, sagittal T2, axial T1, axial T1 postcontrast, and axial T2    FINDINGS:  There is mild apex right scoliosis of the lumbar spine, with mild apex left  scoliosis of the thoracolumbar junction. 4 mm of grade 1 anterior listhesis of  L4 on L5. Vertebral body heights are preserved. No fracture. Minimal multilevel  small osteophyte formation. Mild disc narrowing and desiccation throughout the lumbar spine. Conus terminates at the L1-2 level. Mild degenerative changes present in the sacroiliac joints. Patient has  undergone previous partial left nephrectomy. High T2 signal foci in the kidneys,  incompletely evaluated on this study. High T2 signal focus in the posterior  right hepatic lobe is also possibly a cyst, but incompletely evaluated. 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       Correlation of axial and sagittal images reveals the following:    At L1-L2: No significant disc pathology or proliferative changes. No central  canal or foraminal stenosis. At L2-L3: Mild bulging of the posterolateral disc corners, with mild facet  arthropathy and ligamentous buckling. Trace facet joint effusions. Canal is  patent. Mild right greater than left foraminal narrowing. At L3-L4: Mild bulging of the posterolateral disc corners, with mild facet  arthropathy and ligamentous buckling. Trace right facet joint effusion. Canal is  patent. Mild bilateral foraminal narrowing. At L4-L5: Uncovering of the disc due to the listhesis, with diffuse disc bulge. Mild to moderate facet arthropathy. Canal is patent.  Susceptibility artifact in  the posterior soft tissues. Mild bilateral foraminal narrowing. Question prior  left hemilaminectomy. At L5-S1: Small posterior disc bulge, with mild facet arthropathy. Canal is  patent. No significant foraminal narrowing.              Impression IMPRESSION:    Thoracolumbar scoliosis. Grade 1 anterior listhesis of L4 on L5. Multilevel  degenerative changes, with overall patent canal. Multilevel mild foraminal  narrowing. Question prior left hemilaminectomy at L4. High T2 signal foci in the kidneys, incompletely evaluated on this study. High  T2 signal focus in the posterior right hepatic lobe is also possibly a cyst, but  incompletely evaluated.        Written by Genetmara Prophaleksandar, as dictated by Luis Cagle MD.  I, Dr. Luis Cagle confirm that all documentation is accurate.

## 2020-12-02 NOTE — PROGRESS NOTES
[facility]  Nichol [address] [place] [address], [facility] [address]  Phone: [phone]  Fax: [phone]    Pt's YOB: 1940    ASSESSMENT   Diagnoses and all orders for this visit:    1. Other chronic pain  -     acetaminophen-codeine (TYLENOL #3) 300-30 mg per tablet; Take 1 Tab by mouth as needed (TID prn chronic pain) for up to 30 days. Max Daily Amount: 2 Tabs. 2. Use of opiates for therapeutic purposes  -     acetaminophen-codeine (TYLENOL #3) 300-30 mg per tablet; Take 1 Tab by mouth as needed (TID prn chronic pain) for up to 30 days. Max Daily Amount: 2 Tabs. 3. Lumbar facet arthropathy  -     acetaminophen-codeine (TYLENOL #3) 300-30 mg per tablet; Take 1 Tab by mouth as needed (TID prn chronic pain) for up to 30 days. Max Daily Amount: 2 Tabs. 4. Right shoulder pain, unspecified chronicity  -     acetaminophen-codeine (TYLENOL #3) 300-30 mg per tablet; Take 1 Tab by mouth as needed (TID prn chronic pain) for up to 30 days. Max Daily Amount: 2 Tabs. 5. Stage 3 chronic kidney disease, unspecified whether stage 3a or 3b CKD    6. S/p right nephrectomy for cancer in 1/10         IMPRESSION AND PLAN:  Alvarez Gomes is a 68 y.o. right hand dominant female with history of chronic lumbar pain. She complains of pain in the lower back and in both shoulders, R>L. Pt admits to increased pain with the colder weather. She takes Tylenol #3 1 tab BID-TID as needed with benefit. 1) Pt was given information on lumbar and shoulder arthritis exercises. 2) She received a refill of Tylenol #3 1 tab TID prn chronic pain. 3) Pt is not a candidate for NSAID's due to renal disease and a partial nephrectomy for cancer. 4) I recommended the patient use OTC Voltaren gel on her shoulder or hands if needed. 5) I offered to reorder right shoulder x-rays but patient deferred.  She wishes to try home exercise first.   6) I recommended the patient use moist heat on her shoulders. 7) I recommended the patient try lasha chi.   8) Ms. Lenoard Sicard has a reminder for a \"due or due soon\" health maintenance. I have asked that she contact her primary care provider, Wiliam Dockery DO, for follow-up on this health maintenance. 9)  demonstrated consistency with prescribing. 10) Last UDS from 9/3/2020 was consistent. Follow-up and Dispositions    · Return in about 3 months (around 3/3/2021) for Medication follow up. HISTORY OF PRESENT ILLNESS:  Vaughn Bobo is a 68 y.o. right hand dominant female with history of chronic lumbar pain and presents to the office today for follow up. She complains of pain in the lower back and in both shoulders, R>L. Pt admits to increased lower back pain with the colder weather. She also complains of pain in the hands, L>R, Pt reports that she fell this morning when she tripped over her . She takes Tylenol #3 1 tab BID-TID as needed. Of note, patient's MME/day is 9-13.5. Pt did not undergo the right shoulder x-rays ordered at her last office visit. She has a cane at home but notes that she does not use it as she states she tends to trip over it   She was offered PT but declined. Pt at this time desires to continue with current care. Pain Scale: 4/10    PCP: Wiliam Dockery DO     Past Medical History:   Diagnosis Date    Anemia NEC     Aneurysm ([address]     left kidney    Arthritis     Asthma     Cardiac catheterization 02/23/2015    R dominant. LM patent. mLAD 20%. CX patent. mRCA 20%. LVEDP 8.  EF 60%. Mild AS. RA 3/1. PA 24. W 6/5.  CI 2.94.    Cardiac echocardiogram 07/27/2016    EF 60-65%. No WMA. Mild LVH. Normal LV diastolic fx. Mild LAE. Mod-severe AS (mean grad 34 mmHg). AS has worsened since study of 1/20/15. Mild MR.  Cardiac nuclear imaging test 02/10/2015    No evidence of ischemia or prior infarction. Hyperdynamic LV fx.  EF >70%. No RWMA. Nondiagnostic EKG on pharm stress test.  Low risk.     Carotid duplex 01/12/2016    Mild < 50% bilateral ICA stenosis.       Chest tightness     exertional, in the setting of widely patent coronary arteries, possibly related to elevated left ventricular end-diastolic pressure or possibly to small-vessel disease, improved on Cardizem CD in place of Verapamil    Chronic kidney disease, stage III (moderate)     Essential hypertension     Glaucoma     Heart murmur     Hypercholesterolemia     Hypoglycemia     Raynaud's syndrome     Renal cell carcinoma (HCC)     Stage T1a Furhman Grade 2 s/p right open partial nephrectomy 2/22/10      S/p nephrectomy 2/22/10    right    Scoliosis     Stroke [facility]) 2/2015    resid los of balance        Social History     Socioeconomic History    Marital status:      Spouse name: Not on file    Number of children: Not on file    Years of education: Not on file    Highest education level: Not on file   Occupational History    Not on file   Social Needs    Financial resource strain: Not on file    Food insecurity     Worry: Not on file     Inability: Not on file    Transportation needs     Medical: Not on file     Non-medical: Not on file   Tobacco Use    Smoking status: Never Smoker    Smokeless tobacco: Never Used   Substance and Sexual Activity    Alcohol use: No     Alcohol/week: 0.0 standard drinks    Drug use: No    Sexual activity: Yes     Partners: Male   Lifestyle    Physical activity     Days per week: Not on file     Minutes per session: Not on file    Stress: Not on file   Relationships    Social connections     Talks on phone: Not on file     Gets together: Not on file     Attends [employer] service: Not on file     Active member of club or organization: Not on file     Attends meetings of clubs or organizations: Not on file     Relationship status: Not on file    Intimate partner violence     Fear of current or ex partner: Not on file     Emotionally abused: Not on file     Physically abused: Not on file     Forced sexual activity: Not on file   Other Topics Concern    Not on file   Social History Narrative    Not on file       Current Outpatient Medications   Medication Sig Dispense Refill    [START ON 12/18/2020] acetaminophen-codeine (TYLENOL #3) 300-30 mg per tablet Take 1 Tab by mouth as needed (TID prn chronic pain) for up to 30 days. Max Daily Amount: 2 Tabs. 90 Tab 2    cyanocobalamin (VITAMIN B12) 100 mcg tablet Take 100 mcg by mouth daily.  losartan (COZAAR) 100 mg tablet Take 1 Tab by mouth daily. 90 Tab 3    furosemide (LASIX) 20 mg tablet Take 20 mg by mouth daily.  amLODIPine (NORVASC) 5 mg tablet Take 5 mg by mouth daily.  LINZESS 145 mcg cap capsule Take 145 mcg by mouth daily.  aspirin delayed-release 81 mg tablet Take 81 mg by mouth daily.  atorvastatin (LIPITOR) 80 mg tablet Take 1 Tab by mouth daily. 90 Tab 3    Cholecalciferol, Vitamin D3, 50,000 unit cap Take 1 Cap by mouth every seven (7) days.  PROPYLENE GLYCOL/ (SYSTANE OP) Apply  to eye as needed.  albuterol (PROVENTIL HFA, VENTOLIN HFA) 90 mcg/actuation inhaler Take 2 Puffs by inhalation as needed.  cetirizine (ZYRTEC) 10 mg tablet Take 10 mg by mouth as needed.  acetaminophen-codeine (TYLENOL #3) 300-30 mg per tablet TAKE 1 TABLET BY MOUTH AS NEEDED(BID AS NEEDED CHRONIC PAIN) FOR UP TO 30 DAYS 60 Tab 1       Allergies   Allergen Reactions    Vicodin [Hydrocodone-Acetaminophen] Nausea and Vomiting         REVIEW OF SYSTEMS    Constitutional: Negative for fever, chills, or weight change. Respiratory: Negative for cough or shortness of breath. Cardiovascular: Negative for chest pain or palpitations. Gastrointestinal: Negative for acid reflux, change in bowel habits, or constipation. Genitourinary: Negative for dysuria and flank pain. Musculoskeletal: Positive for lumbar and bilateral shoulder pain. Neurological: Negative for headaches, dizziness, or numbness. Psychiatric/Behavioral: Positive for difficulty with sleep. As per HPI    PHYSICAL EXAMINATION  Visit Vitals  BP (!) 150/74 (BP 1 Location: Left arm, BP Patient Position: Sitting)   Pulse 81   Temp 97.1 °F (36.2 °C) (Skin)   Resp 16   Ht 5' 2\" (1.575 m)   Wt 126 lb (57.2 kg)   SpO2 100%   BMI 23.05 kg/m²       Constitutional: Awake, alert, and in no acute distress. Neurological: 1+ symmetrical DTRs in the upper extremities. 1+ symmetrical DTRs in the lower extremities. Sensation to light touch is intact. Negative Mariano's sign bilaterally. Skin: warm, dry, and intact. Musculoskeletal: Pain with abduction of both shoulders but food range of motion. Tight across the upper trapezius. Tenderness to palpation in the lower lumbar region. Moderate pain with extension and axial loading. No pain with internal or external rotation of her hips. Negative straight leg raise bilaterally. Biceps  Triceps Deltoids Wrist Ext Wrist Flex Hand Intrin   Right  4/5  4/5  4/5  4/5  4/5  4/5   Left  4/5  4/5  4/5  4/5  4/5  4/5      Hip Flex  Quads Hamstrings Ankle DF EHL Ankle PF   Right +4/5 +4/5 +4/5 +4/5 +4/5 +4/5   Left +4/5 +4/5 +4/5 +4/5 +4/5 +4/5     IMAGING:    Lumbar spine MRI from 03/16/2016 was personally reviewed with the patient and demonstrated:  Results from [facility] on 03/16/16   MRI LUMB SPINE W WO CONT     Narrative MRI lumbar spine with and without contrast    COMPARISON: CT chest abdomen and pelvis October 6, 2011    CLINICAL INFORMATION: Progressive back pain, left leg pain. PROCEDURE:    MRI of the lumbar spine was performed prior to and following the uneventful  administration of 11 cc of Magnevist venous leak.  Sequences included: Localizer,  sagittal T1, sagittal T1 postcontrast with fat saturation, sagittal inversion  recovery, sagittal T2, axial T1, axial T1 postcontrast, and axial T2    FINDINGS:  There is mild apex right scoliosis of the lumbar spine, with mild apex left  scoliosis of the thoracolumbar junction. 4 mm of grade 1 anterior listhesis of  L4 on L5. Vertebral body heights are preserved. No fracture. Minimal multilevel  small osteophyte formation. Mild disc narrowing and desiccation throughout the lumbar spine. Conus terminates at the L1-2 level. Mild degenerative changes present in the sacroiliac joints. Patient has  undergone previous partial left nephrectomy. High T2 signal foci in the kidneys,  incompletely evaluated on this study. High T2 signal focus in the posterior  right hepatic lobe is also possibly a cyst, but incompletely evaluated. 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       Correlation of axial and sagittal images reveals the following:    At L1-L2: No significant disc pathology or proliferative changes. No central  canal or foraminal stenosis. At L2-L3: Mild bulging of the posterolateral disc corners, with mild facet  arthropathy and ligamentous buckling. Trace facet joint effusions. Canal is  patent. Mild right greater than left foraminal narrowing. At L3-L4: Mild bulging of the posterolateral disc corners, with mild facet  arthropathy and ligamentous buckling. Trace right facet joint effusion. Canal is  patent. Mild bilateral foraminal narrowing. At L4-L5: Uncovering of the disc due to the listhesis, with diffuse disc bulge. Mild to moderate facet arthropathy. Canal is patent.  Susceptibility artifact in  the posterior soft tissues. Mild bilateral foraminal narrowing. Question prior  left hemilaminectomy. At L5-S1: Small posterior disc bulge, with mild facet arthropathy. Canal is  patent. No significant foraminal narrowing.              Impression IMPRESSION:    Thoracolumbar scoliosis. Grade 1 anterior listhesis of L4 on L5. Multilevel  degenerative changes, with overall patent canal. Multilevel mild foraminal  narrowing. Question prior left hemilaminectomy at L4. High T2 signal foci in the kidneys, incompletely evaluated on this study. High  T2 signal focus in the posterior right hepatic lobe is also possibly a cyst, but  incompletely evaluated.         Written by Tomas Ramirez, as dictated by Keagan Hope MD.  I, Dr. Keagan Hope confirm that all documentation is accurate.

## 2020-12-03 ENCOUNTER — OFFICE VISIT (OUTPATIENT)
Dept: ORTHOPEDIC SURGERY | Age: 80
End: 2020-12-03
Payer: MEDICARE

## 2020-12-03 VITALS
HEIGHT: 62 IN | BODY MASS INDEX: 23.19 KG/M2 | TEMPERATURE: 97.1 F | WEIGHT: 126 LBS | DIASTOLIC BLOOD PRESSURE: 74 MMHG | RESPIRATION RATE: 16 BRPM | OXYGEN SATURATION: 100 % | SYSTOLIC BLOOD PRESSURE: 150 MMHG | HEART RATE: 81 BPM

## 2020-12-03 DIAGNOSIS — Z90.5 S/P NEPHRECTOMY: ICD-10-CM

## 2020-12-03 DIAGNOSIS — Z79.891 USE OF OPIATES FOR THERAPEUTIC PURPOSES: ICD-10-CM

## 2020-12-03 DIAGNOSIS — M47.816 LUMBAR FACET ARTHROPATHY: ICD-10-CM

## 2020-12-03 DIAGNOSIS — M25.511 RIGHT SHOULDER PAIN, UNSPECIFIED CHRONICITY: ICD-10-CM

## 2020-12-03 DIAGNOSIS — G89.29 OTHER CHRONIC PAIN: Primary | ICD-10-CM

## 2020-12-03 DIAGNOSIS — N18.30 STAGE 3 CHRONIC KIDNEY DISEASE, UNSPECIFIED WHETHER STAGE 3A OR 3B CKD (HCC): ICD-10-CM

## 2020-12-03 PROCEDURE — G8536 NO DOC ELDER MAL SCRN: HCPCS | Performed by: PHYSICAL MEDICINE & REHABILITATION

## 2020-12-03 PROCEDURE — G8432 DEP SCR NOT DOC, RNG: HCPCS | Performed by: PHYSICAL MEDICINE & REHABILITATION

## 2020-12-03 PROCEDURE — G8754 DIAS BP LESS 90: HCPCS | Performed by: PHYSICAL MEDICINE & REHABILITATION

## 2020-12-03 PROCEDURE — G8420 CALC BMI NORM PARAMETERS: HCPCS | Performed by: PHYSICAL MEDICINE & REHABILITATION

## 2020-12-03 PROCEDURE — G8427 DOCREV CUR MEDS BY ELIG CLIN: HCPCS | Performed by: PHYSICAL MEDICINE & REHABILITATION

## 2020-12-03 PROCEDURE — 1100F PTFALLS ASSESS-DOCD GE2>/YR: CPT | Performed by: PHYSICAL MEDICINE & REHABILITATION

## 2020-12-03 PROCEDURE — 99213 OFFICE O/P EST LOW 20 MIN: CPT | Performed by: PHYSICAL MEDICINE & REHABILITATION

## 2020-12-03 PROCEDURE — G8399 PT W/DXA RESULTS DOCUMENT: HCPCS | Performed by: PHYSICAL MEDICINE & REHABILITATION

## 2020-12-03 PROCEDURE — G8753 SYS BP > OR = 140: HCPCS | Performed by: PHYSICAL MEDICINE & REHABILITATION

## 2020-12-03 PROCEDURE — 1090F PRES/ABSN URINE INCON ASSESS: CPT | Performed by: PHYSICAL MEDICINE & REHABILITATION

## 2020-12-03 PROCEDURE — 3288F FALL RISK ASSESSMENT DOCD: CPT | Performed by: PHYSICAL MEDICINE & REHABILITATION

## 2020-12-03 RX ORDER — ACETAMINOPHEN AND CODEINE PHOSPHATE 300; 30 MG/1; MG/1
1 TABLET ORAL AS NEEDED
Qty: 90 TAB | Refills: 2 | Status: ON HOLD | OUTPATIENT
Start: 2020-12-18 | End: 2021-11-03

## 2020-12-03 NOTE — LETTER
12/4/20 Patient: Jose Fernández YOB: 1940 Date of Visit: 12/3/2020 Festus Almanza, [address] [id number] VIA In Basket Dear Festus Almanza DO, Thank you for referring Ms. Clinton Cohen to [facility] for evaluation. My notes for this consultation are attached. If you have questions, please do not hesitate to call me. I look forward to following your patient along with you. Sincerely, Jose Soriano MD

## 2020-12-03 NOTE — PATIENT INSTRUCTIONS
Low Back Arthritis: Exercises  Introduction  Here are some examples of typical rehabilitation exercises for your condition. Start each exercise slowly. Ease off the exercise if you start to have pain. Your doctor or physical therapist will tell you when you can start these exercises and which ones will work best for you. When you are not being active, find a comfortable position for rest. Some people are comfortable on the floor or a medium-firm bed with a small pillow under their head and another under their knees. Some people prefer to lie on their side with a pillow between their knees. Don't stay in one position for too long. Take short walks (10 to 20 minutes) every 2 to 3 hours. Avoid slopes, hills, and stairs until you feel better. Walk only distances you can manage without pain, especially leg pain. How to do the exercises  Pelvic tilt   1. Lie on your back with your knees bent. 2. \"Brace\" your stomachtighten your muscles by pulling in and imagining your belly button moving toward your spine. 3. Press your lower back into the floor. You should feel your hips and pelvis rock back. 4. Hold for 6 seconds while breathing smoothly. 5. Relax and allow your pelvis and hips to rock forward. 6. Repeat 8 to 12 times. Back stretches   1. Get down on your hands and knees on the floor. 2. Relax your head and allow it to droop. Round your back up toward the ceiling until you feel a nice stretch in your upper, middle, and lower back. Hold this stretch for as long as it feels comfortable, or about 15 to 30 seconds. 3. Return to the starting position with a flat back while you are on your hands and knees. 4. Let your back sway by pressing your stomach toward the floor. Lift your buttocks toward the ceiling. 5. Hold this position for 15 to 30 seconds. 6. Repeat 2 to 4 times. Follow-up care is a key part of your treatment and safety.  Be sure to make and go to all appointments, and call your doctor if you are having problems. It's also a good idea to know your test results and keep a list of the medicines you take. Where can you learn more? Go to http://www.[employer].com/  Enter T094 in the search box to learn more about \"Low Back Arthritis: Exercises. \"  Current as of: March 2, 2020               Content Version: 12.6  © 2006-2020 [employer]. Care instructions adapted under license by [employer] (which disclaims liability or warranty for this information). If you have questions about a medical condition or this instruction, always ask your healthcare professional. [place] [address] any warranty or liability for your use of this information. Shoulder Arthritis: Exercises  Introduction  Here are some examples of exercises for you to try. The exercises may be suggested for a condition or for rehabilitation. Start each exercise slowly. Ease off the exercises if you start to have pain. You will be told when to start these exercises and which ones will work best for you. How to do the exercises  Shoulder flexion (lying down)   To make a wand for this exercise, use a piece of PVC pipe or a broom handle with the broom removed. Make the wand about a foot wider than your shoulders. 7. Lie on your back, holding a wand with both hands. Your palms should face down as you hold the wand. 8. Keeping your elbows straight, slowly raise your arms over your head. Raise them until you feel a stretch in your shoulders, upper back, and chest.  9. Hold for 15 to 30 seconds. 10. Repeat 2 to 4 times. Shoulder rotation (lying down)   To make a wand for this exercise, use a piece of PVC pipe or a broom handle with the broom removed. Make the wand about a foot wider than your shoulders. 7. Lie on your back. Hold a wand with both hands with your elbows bent and palms up.   8. Keep your elbows close to your body, and move the wand across your body toward the sore arm. 9. Hold for 8 to 12 seconds. 10. Repeat 2 to 4 times. Shoulder internal rotation with towel   1. Hold a towel above and behind your head with the arm that is not sore. 2. With your sore arm, reach behind your back and grasp the towel. 3. With the arm above your head, pull the towel upward. Do this until you feel a stretch on the front and outside of your sore shoulder. 4. Hold 15 to 30 seconds. 5. Repeat 2 to 4 times. Shoulder blade squeeze   1. Stand with your arms at your sides, and squeeze your shoulder blades together. Do not raise your shoulders up as you squeeze. 2. Hold 6 seconds. 3. Repeat 8 to 12 times. Resisted rows   For this exercise, you will need elastic exercise material, such as surgical tubing or Thera-Band. 1. Put the band around a solid object at about waist level. (A bedpost will work well.) Each hand should hold an end of the band. 2. With your elbows at your sides and bent to 90 degrees, pull the band back. Your shoulder blades should move toward each other. Return to the starting position. 3. Repeat 8 to 12 times. External rotator strengthening exercise   1. Start by tying a piece of elastic exercise material to a doorknob. You can use surgical tubing or Thera-Band. (You may also hold one end of the band in each hand.)  2. Stand or sit with your shoulder relaxed and your elbow bent 90 degrees. Your upper arm should rest comfortably against your side. Squeeze a rolled towel between your elbow and your body for comfort. This will help keep your arm at your side. 3. Hold one end of the elastic band with the hand of the painful arm. 4. Start with your forearm across your belly. Slowly rotate the forearm out away from your body. Keep your elbow and upper arm tucked against the towel roll or the side of your body until you begin to feel tightness in your shoulder. Slowly move your arm back to where you started. 5. Repeat 8 to 12 times.     Internal rotator strengthening exercise   1. Start by tying a piece of elastic exercise material to a doorknob. You can use surgical tubing or Thera-Band. 2. Stand or sit with your shoulder relaxed and your elbow bent 90 degrees. Your upper arm should rest comfortably against your side. Squeeze a rolled towel between your elbow and your body for comfort. This will help keep your arm at your side. 3. Hold one end of the elastic band in the hand of the painful arm. 4. Slowly rotate your forearm toward your body until it touches your belly. Slowly move it back to where you started. 5. Keep your elbow and upper arm firmly tucked against the towel roll or at your side. 6. Repeat 8 to 12 times. Pendulum swing   If you have pain in your back, do not do this exercise. 1. Hold on to a table or the back of a chair with your good arm. Then bend forward a little and let your sore arm hang straight down. This exercise does not use the arm muscles. Rather, use your legs and your hips to create movement that makes your arm swing freely. 2. Use the movement from your hips and legs to guide the slightly swinging arm back and forth like a pendulum (or elephant trunk). Then guide it in circles that start small (about the size of a dinner plate). Make the circles a bit larger each day, as your pain allows. 3. Do this exercise for 5 minutes, 5 to 7 times each day. 4. As you have less pain, try bending over a little farther to do this exercise. This will increase the amount of movement at your shoulder. Follow-up care is a key part of your treatment and safety. Be sure to make and go to all appointments, and call your doctor if you are having problems. It's also a good idea to know your test results and keep a list of the medicines you take. Where can you learn more? Go to http://www.gray.com/  Enter H562 in the search box to learn more about \"Shoulder Arthritis: Exercises. \"  Current as of: March 2, 2020               Content Version: 12.6  © 3923-5730 [employer]. Care instructions adapted under license by [employer] (which disclaims liability or warranty for this information). If you have questions about a medical condition or this instruction, always ask your healthcare professional. [address] any warranty or liability for your use of this information.

## 2020-12-18 NOTE — TELEPHONE ENCOUNTER
Called and spoke with González Duenas at [facility]. Informed Nida max daily quantity should say 3 not 2. Nida verbalized agreement/understanding. No further action required at this time.

## 2020-12-18 NOTE — TELEPHONE ENCOUNTER
Patient called stating the pharmacy told her \"there was a problem\" with her rx acetaminophen-codeine (TYLENOL #3) 300-30 mg per tablet and the pharmacy needs to speak with provider. Patient can be reached at [phone] if needed.

## 2021-03-04 ENCOUNTER — OFFICE VISIT (OUTPATIENT)
Dept: ORTHOPEDIC SURGERY | Age: 81
End: 2021-03-04
Payer: MEDICARE

## 2021-03-04 VITALS
HEIGHT: 61 IN | RESPIRATION RATE: 17 BRPM | HEART RATE: 82 BPM | SYSTOLIC BLOOD PRESSURE: 137 MMHG | DIASTOLIC BLOOD PRESSURE: 67 MMHG | OXYGEN SATURATION: 99 % | BODY MASS INDEX: 22.09 KG/M2 | WEIGHT: 117 LBS | TEMPERATURE: 97.5 F

## 2021-03-04 DIAGNOSIS — Z90.5 S/P NEPHRECTOMY: ICD-10-CM

## 2021-03-04 DIAGNOSIS — G89.29 OTHER CHRONIC PAIN: Primary | ICD-10-CM

## 2021-03-04 DIAGNOSIS — Z79.891 USE OF OPIATES FOR THERAPEUTIC PURPOSES: ICD-10-CM

## 2021-03-04 DIAGNOSIS — M25.511 CHRONIC RIGHT SHOULDER PAIN: ICD-10-CM

## 2021-03-04 DIAGNOSIS — G89.29 CHRONIC RIGHT SHOULDER PAIN: ICD-10-CM

## 2021-03-04 DIAGNOSIS — N18.30 STAGE 3 CHRONIC KIDNEY DISEASE, UNSPECIFIED WHETHER STAGE 3A OR 3B CKD (HCC): ICD-10-CM

## 2021-03-04 DIAGNOSIS — M47.816 LUMBAR FACET ARTHROPATHY: ICD-10-CM

## 2021-03-04 DIAGNOSIS — G89.29 OTHER CHRONIC PAIN: ICD-10-CM

## 2021-03-04 PROCEDURE — G8399 PT W/DXA RESULTS DOCUMENT: HCPCS | Performed by: PHYSICAL MEDICINE & REHABILITATION

## 2021-03-04 PROCEDURE — G8752 SYS BP LESS 140: HCPCS | Performed by: PHYSICAL MEDICINE & REHABILITATION

## 2021-03-04 PROCEDURE — G8754 DIAS BP LESS 90: HCPCS | Performed by: PHYSICAL MEDICINE & REHABILITATION

## 2021-03-04 PROCEDURE — G8427 DOCREV CUR MEDS BY ELIG CLIN: HCPCS | Performed by: PHYSICAL MEDICINE & REHABILITATION

## 2021-03-04 PROCEDURE — G8536 NO DOC ELDER MAL SCRN: HCPCS | Performed by: PHYSICAL MEDICINE & REHABILITATION

## 2021-03-04 PROCEDURE — G8420 CALC BMI NORM PARAMETERS: HCPCS | Performed by: PHYSICAL MEDICINE & REHABILITATION

## 2021-03-04 PROCEDURE — 1090F PRES/ABSN URINE INCON ASSESS: CPT | Performed by: PHYSICAL MEDICINE & REHABILITATION

## 2021-03-04 PROCEDURE — 99214 OFFICE O/P EST MOD 30 MIN: CPT | Performed by: PHYSICAL MEDICINE & REHABILITATION

## 2021-03-04 PROCEDURE — 3288F FALL RISK ASSESSMENT DOCD: CPT | Performed by: PHYSICAL MEDICINE & REHABILITATION

## 2021-03-04 PROCEDURE — G8510 SCR DEP NEG, NO PLAN REQD: HCPCS | Performed by: PHYSICAL MEDICINE & REHABILITATION

## 2021-03-04 PROCEDURE — 1100F PTFALLS ASSESS-DOCD GE2>/YR: CPT | Performed by: PHYSICAL MEDICINE & REHABILITATION

## 2021-03-04 RX ORDER — ACETAMINOPHEN AND CODEINE PHOSPHATE 300; 30 MG/1; MG/1
1 TABLET ORAL AS NEEDED
Qty: 90 TAB | Refills: 2 | Status: SHIPPED | OUTPATIENT
Start: 2021-03-15 | End: 2021-06-10 | Stop reason: SDUPTHER

## 2021-03-04 NOTE — LETTER
Name:.Bianca Greene YBF:[birth year] MR #:[id number] 1691 [facility] 9 Page 1 of 5 CONTROLLED SUBSTANCE AGREEMENT I may be prescribed medications that are controlled substances as part  of my treatment plan for management of my medical condition(s). The goal of my treatment plan is to maintain and/or improve my health and wellbeing. Because controlled substances have an increased risk of abuse or harm, continual re-evaluation is needed determine if the goals of my treatment plan are being met for my safety and the safety of others. Alycia West  am entering into this Controlled Substance Agreement with my provider, Dr. Joseph Espinal at [address] [place] [address] . I understand that successful treatment requires mutual trust and honesty between me and my provider. I understand that there are state and federal laws and regulations which apply to the medications that my provider may prescribe that must be followed. I understand there are risks and benefits of taking the medicines that my provider may prescribe. I understand and agree that following this Agreement is necessary in continuing my provider-patient relationship and success of my treatment plan. As a part of my treatment plan, I agree to the following: COMMUNICATION: 
 
1. I will communicate fully with my provider about my medical condition(s), including the effect on my daily life and how well my medications are helping. I will tell my provider all of the medications that I take for any reason, including medications I receive from another health care provider, and will notify my provider about all issues, problems or concerns, including any side effects, which may be related to my medications. I understand that this information allows my provider to adjust my treatment plan to help manage my medical condition. I understand that this information will become part of my permanent medical record. 2. I will notify my provider if I have a history of alcohol/drug misuse/addiction or if I have had treatment for alcohol/drug addiction in the past, or if I have a new problem with or concern about alcohol/drug use/addiction, because this increases the likelihood of high risk behaviors and may lead to serious medical conditions. 3. Females Only: I will notify my provider if I am or become pregnant, or if I intend to become pregnant, or if I intend to breastfeed. I understand that communication of these issues with my provider is important, due to possible effects my medication could have on an unborn fetus or breastfeeding child. Initials_____ Name:Radha Cates ABQ:[birth year] MR #:[id number] 1691 [facility] 9 Page 2 of 5 MISUSE OF MEDICATIONS / DRUGS: 
 
1. I agree to take all controlled substances as prescribed, and will not misuse or abuse any controlled substances prescribed by my provider. For my safety, I will not increase the amount of medicine I take without first talking with and getting permission from my provider. 2. If I have a medical emergency, another health care provider may prescribe me medication. If I seek emergency treatment, I will notify my provider within seventy-two (72) hours. 3. I understand that my provider may discuss my use and/or possible misuse/abuse of controlled substances and alcohol, as appropriate, with any health care provider involved in my care, pharmacist or legal authority. ILLEGAL DRUGS: 
 
1. I will not use illegal drugs of any kind, including but not limited to marijuana, heroin, cocaine, or any prescription drug which is not prescribed to me.  
 
DRUG DIVERSION / PRESCRIPTION FRAUD: 
 
 1. I will not share, sell, trade, give away, or otherwise misuse my prescriptions or medications. 2. I will not alter any prescriptions provided to me by my provider. SINGLE PROVIDER: 
 
1. I agree that all controlled substances that I take will be prescribed only by my provider (or his/her covering provider) under this Agreement. This agreement does not prevent me from seeking emergency medical treatment or receiving pain management related to a surgery. PROTECTING MEDICATIONS: 
 
1. I am responsible for keeping my prescriptions and medications in a safe and secure place including safeguarding them from loss or theft. I understand that lost, stolen or damaged/destroyed prescriptions or medications will not be replaced. Initials____ Name:Radha Quevedo SEA:[birth year] MR #:[id number] 16[address] [place] [address] Page 3 of 5 PRESCRIPTION RENEWALS/REFILLS: 
 
1. I will follow my controlled substance medication schedule as prescribed by my provider. 2. I understand and agree that I will make any requests for renewals or refills of my prescriptions only at the time of an office visit or during my providers regular office hours subject to the prescription refill requirements of the individual practice. 3. I understand that my provider may not call in prescriptions for controlled substances to my pharmacy. 4. I understand that my provider may adjust or discontinue these medications as deemed appropriate for my medical treatment plan. This Agreement does not guarantee the prescription of controlled medications. 5. I agree that if my medications are adjusted or discontinued, I will properly dispose of any remaining medications. I understand that I will be required to dispose of any remaining controlled medications prior to being provided with any prescriptions for other controlled medications. 6. I understand that the renewal of my prescription depends on my medical condition, my consistent participation, and my adherence with my treatment plan and this Agreement. 7. I understand that if I do not keep an appointment with my provider, I may not receive a renewal or refill for my controlled substance medication. PRESCRIPTION MONITORING / DRUG TESTIN. I understand that my provider may require me to provide urine, saliva or blood for testing at any time. I understand that this testing will be used to monitor for safety and adherence with my treatment plan and this Agreement. 2. I understand that my provider may ask me to provide an observed urine specimen, which means that a nurse or other health care provider may watch me provide urine, and I agree to cooperate if I am asked to provide an observed specimen. 3. I understand that if I do not provide urine, saliva or blood samples within two (2) hours of my providers request, or other timeframe decided by my provider, my treatment plan could be changed, or my prescriptions and medications may be changed or ended. 4. I understand that urine, saliva and blood test results will be a part of my permanent medical record. Initials_____ Name:Radha Kothari MGP: MR #:[id number] 1691 [facility] 9 Page 4 of 5 
 
 
1. I authorize my provider and my pharmacy to cooperate fully with any local, state, or federal law enforcement agency in the investigation of any possible misuse, sale, or other diversion of my controlled substance prescriptions or medications. RISKS: 
 
1. I understand that my level of consciousness may be affected from the use of controlled substances, and I understand that there are risks, benefits, effects and potential alternatives (including no treatment) to the medications that my provider has prescribed. 2. I understand that I may become drowsy, tired, dizzy, constipated, and sick to my stomach, or have changes in my mood or in my sleep while taking my medications. I have talked with my provider about these possible side effects, risks, benefits, and alternative treatments, and my provider has answered all of my questions. 3. I understand that I should not suddenly stop taking my medications without first speaking with my provider. I understand that if I suddenly stop taking my medications, I may experience nausea, vomiting, sweating,anxiety, sleeplessness, itching or other uncomfortable feelings. 4. I will not take my medications with alcohol of any kind, including beer, wine or liquor. I understand that drinking alcohol with my medications increases the chances of side effects, including breathing problems or even death. 5. I understand that if I have a history of alcoholism or other drug addiction I may be at increased risk of addiction to my medications. Signs of addiction might include craving, compulsive use, and continued use despite harm. Since addiction is a disease, I understand my provider may decide to change my medications and refer me to appropriate treatment services. I understand that this information would become part of my permanent medical record. Initials_____ Name:Radha Gandara BDY: MR #:[id number] [address] [place] [address] Page 5 of 5  
 
 
6. Females only: Children born to women who regularly take controlled substances are likely to have physical problems and suffer withdrawal symptoms at birth. If I am of child-bearing age, I understand that I should use safe and effective birth control while taking any controlled substances to avoid the impact of medications on an unborn fetus or  child. I agree to notify my provider immediately if I should become pregnant so that my treatment plan can be adjusted. 7. Males only: I understand that chronic use of controlled substances has been associated with low testosterone levels in males which may affect my mood, stamina, sexual desire, and general health. I understand that my provider may order the appropriate laboratory test to determine my testosterone level,and I agree to this testing. ADHERENCE: 
 
1. I understand that if I do not adhere to this Agreement in any way, my provider may change my prescriptions, stop prescribing controlled substances or end our provider-patient relationship. 2. If my provider decides to stop prescribing medication, or decides to end our provider-patient relationship,my provider may require that I taper my medications slowly. If necessary, my provider may also provide a prescription for other medications to treat my withdrawal symptoms. UNDERSTANDING THIS AGREEMENT: 
 
I understand that my provider may adjust or stop my prescriptions for controlled substances based on my medical condition and my treatment plan. I understand that this Agreement does not guarantee that I will be prescribed medications or controlled substances. I understand that controlled substances may be just one part 
of my treatment plan. My initial on each page and my signature below shows that I have read each page of this Agreement, I have had an opportunity to ask questions, and all of my questions have been answered to my satisfaction by my provider. By signing below, I agree to comply with this Agreement, and I understand that if I do not follow the Agreements listed above, my provider may stop 
 
_________________________________________  Date/Time 3/4/2021 9:47 AM   
             (Patient Signature) 
 
_________________________________________ Date/Time 3/4/2021 9:47 AM 
 (Provider Signature)

## 2021-03-04 NOTE — PATIENT INSTRUCTIONS
Low Back Arthritis: Exercises Introduction Here are some examples of typical rehabilitation exercises for your condition. Start each exercise slowly. Ease off the exercise if you start to have pain. Your doctor or physical therapist will tell you when you can start these exercises and which ones will work best for you. When you are not being active, find a comfortable position for rest. Some people are comfortable on the floor or a medium-firm bed with a small pillow under their head and another under their knees. Some people prefer to lie on their side with a pillow between their knees. Don't stay in one position for too long. Take short walks (10 to 20 minutes) every 2 to 3 hours. Avoid slopes, hills, and stairs until you feel better. Walk only distances you can manage without pain, especially leg pain. How to do the exercises Pelvic tilt 1. Lie on your back with your knees bent. 2. \"Brace\" your stomachtighten your muscles by pulling in and imagining your belly button moving toward your spine. 3. Press your lower back into the floor. You should feel your hips and pelvis rock back. 4. Hold for 6 seconds while breathing smoothly. 5. Relax and allow your pelvis and hips to rock forward. 6. Repeat 8 to 12 times. Back stretches 1. Get down on your hands and knees on the floor. 2. Relax your head and allow it to droop. Round your back up toward the ceiling until you feel a nice stretch in your upper, middle, and lower back. Hold this stretch for as long as it feels comfortable, or about 15 to 30 seconds. 3. Return to the starting position with a flat back while you are on your hands and knees. 4. Let your back sway by pressing your stomach toward the floor. Lift your buttocks toward the ceiling. 5. Hold this position for 15 to 30 seconds. 6. Repeat 2 to 4 times. Follow-up care is a key part of your treatment and safety. Be sure to make and go to all appointments, and call your doctor if you are having problems. It's also a good idea to know your test results and keep a list of the medicines you take. Where can you learn more? Go to http://www.gray.com/ Enter J873 in the search box to learn more about \"Low Back Arthritis: Exercises. \" Current as of: March 2, 2020               Content Version: 12.6 © 2006-2020 [employer]. Care instructions adapted under license by [employer] (which disclaims liability or warranty for this information). If you have questions about a medical condition or this instruction, always ask your healthcare professional. [address] any warranty or liability for your use of this information. Shoulder Arthritis: Exercises Introduction Here are some examples of exercises for you to try. The exercises may be suggested for a condition or for rehabilitation. Start each exercise slowly. Ease off the exercises if you start to have pain. You will be told when to start these exercises and which ones will work best for you. How to do the exercises Shoulder flexion (lying down) To make a wand for this exercise, use a piece of PVC pipe or a broom handle with the broom removed. Make the wand about a foot wider than your shoulders. 7. Lie on your back, holding a wand with both hands. Your palms should face down as you hold the wand. 8. Keeping your elbows straight, slowly raise your arms over your head. Raise them until you feel a stretch in your shoulders, upper back, and chest. 
9. Hold for 15 to 30 seconds. 10. Repeat 2 to 4 times. Shoulder rotation (lying down) To make a wand for this exercise, use a piece of PVC pipe or a broom handle with the broom removed. Make the wand about a foot wider than your shoulders. 7. Lie on your back. Hold a wand with both hands with your elbows bent and palms up. 8. Keep your elbows close to your body, and move the wand across your body toward the sore arm. 9. Hold for 8 to 12 seconds. 10. Repeat 2 to 4 times. Shoulder internal rotation with towel 1. Hold a towel above and behind your head with the arm that is not sore. 2. With your sore arm, reach behind your back and grasp the towel. 3. With the arm above your head, pull the towel upward. Do this until you feel a stretch on the front and outside of your sore shoulder. 4. Hold 15 to 30 seconds. 5. Repeat 2 to 4 times. Shoulder blade squeeze 1. Stand with your arms at your sides, and squeeze your shoulder blades together. Do not raise your shoulders up as you squeeze. 2. Hold 6 seconds. 3. Repeat 8 to 12 times. Resisted rows For this exercise, you will need elastic exercise material, such as surgical tubing or Thera-Band. 1. Put the band around a solid object at about waist level. (A bedpost will work well.) Each hand should hold an end of the band. 2. With your elbows at your sides and bent to 90 degrees, pull the band back. Your shoulder blades should move toward each other. Return to the starting position. 3. Repeat 8 to 12 times. External rotator strengthening exercise 1. Start by tying a piece of elastic exercise material to a doorknob. You can use surgical tubing or Thera-Band. (You may also hold one end of the band in each hand.) 2. Stand or sit with your shoulder relaxed and your elbow bent 90 degrees. Your upper arm should rest comfortably against your side. Squeeze a rolled towel between your elbow and your body for comfort. This will help keep your arm at your side. 3. Hold one end of the elastic band with the hand of the painful arm. 4. Start with your forearm across your belly. Slowly rotate the forearm out away from your body. Keep your elbow and upper arm tucked against the towel roll or the side of your body until you begin to feel tightness in your shoulder. Slowly move your arm back to where you started. 5. Repeat 8 to 12 times. Internal rotator strengthening exercise 1. Start by tying a piece of elastic exercise material to a doorknob. You can use surgical tubing or Thera-Band. 2. Stand or sit with your shoulder relaxed and your elbow bent 90 degrees. Your upper arm should rest comfortably against your side. Squeeze a rolled towel between your elbow and your body for comfort. This will help keep your arm at your side. 3. Hold one end of the elastic band in the hand of the painful arm. 4. Slowly rotate your forearm toward your body until it touches your belly. Slowly move it back to where you started. 5. Keep your elbow and upper arm firmly tucked against the towel roll or at your side. 6. Repeat 8 to 12 times. Pendulum swing If you have pain in your back, do not do this exercise. 1. Hold on to a table or the back of a chair with your good arm. Then bend forward a little and let your sore arm hang straight down. This exercise does not use the arm muscles. Rather, use your legs and your hips to create movement that makes your arm swing freely. 2. Use the movement from your hips and legs to guide the slightly swinging arm back and forth like a pendulum (or elephant trunk). Then guide it in circles that start small (about the size of a dinner plate). Make the circles a bit larger each day, as your pain allows. 3. Do this exercise for 5 minutes, 5 to 7 times each day. 4. As you have less pain, try bending over a little farther to do this exercise. This will increase the amount of movement at your shoulder. Follow-up care is a key part of your treatment and safety. Be sure to make and go to all appointments, and call your doctor if you are having problems. It's also a good idea to know your test results and keep a list of the medicines you take. Where can you learn more? Go to http://www.gray.com/ Enter H562 in the search box to learn more about \"Shoulder Arthritis: Exercises. \" Current as of: March 2, 2020               Content Version: 12.6 © 2006-2020 [employer]. Care instructions adapted under license by [employer] (which disclaims liability or warranty for this information). If you have questions about a medical condition or this instruction, always ask your healthcare professional. [address] any warranty or liability for your use of this information.

## 2021-03-04 NOTE — LETTER
3/5/2021 Patient: Riley Josue YOB: 1940 Date of Visit: 3/4/2021 Marjorie Yanes, [address] [address] [address] [employer] Dear Marjorie Yanes DO, Thank you for referring Ms. Naomi Mcelroy to [facility] for evaluation. My notes for this consultation are attached. If you have questions, please do not hesitate to call me. I look forward to following your patient along with you. Sincerely, Karly Higgins MD

## 2021-03-04 NOTE — PROGRESS NOTES
[facility]  [address] [place] [address], [facility] [address]  Phone: [phone]  Fax: [phone]    Pt's YOB: 1940    ASSESSMENT   Diagnoses and all orders for this visit:    1. Other chronic pain  -     DRUG SCREEN UR - W/ CONFIRM; Future  -     acetaminophen-codeine (TYLENOL #3) 300-30 mg per tablet; Take 1 Tab by mouth as needed (TID prn chronic pain) for up to 30 days. Max Daily Amount: 2 Tabs. 2. Use of opiates for therapeutic purposes  -     DRUG SCREEN UR - W/ CONFIRM; Future  -     acetaminophen-codeine (TYLENOL #3) 300-30 mg per tablet; Take 1 Tab by mouth as needed (TID prn chronic pain) for up to 30 days. Max Daily Amount: 2 Tabs. 3. Lumbar facet arthropathy  -     acetaminophen-codeine (TYLENOL #3) 300-30 mg per tablet; Take 1 Tab by mouth as needed (TID prn chronic pain) for up to 30 days. Max Daily Amount: 2 Tabs. 4. Chronic right shoulder pain    5. Stage 3 chronic kidney disease, unspecified whether stage 3a or 3b CKD    6. S/p right nephrectomy for cancer in 1/10         IMPRESSION AND PLAN:  Anthony Bach is a 80 y.o. female with history of chronic lumbar pain. She complains of aching pain in the right shoulder and intermittent lower back pain. Pt continues to take Tylenol #3 1 tab BID-TID as her pain warrants. 1) Pt was given information on lumbar and shoulder arthritis exercises. 2) She received a refill of Tylenol #3 1 tab TID prn chronic pain. 3) I recommended the patient use OTC Voltaren gel on her shoulder if needed. 4) I encouraged the patient to use moist heat and instructed her to perform shoulder exercises regularly   5) Pt is not a candidate for NSAID's due to renal disease. 6) Ms. Thomas has a reminder for a \"due or due soon\" health maintenance. I have asked that she contact her primary care provider, Jerome Sever, DO, for follow-up on this health maintenance.   7)  demonstrated consistency with prescribing.  8) Pt offered PT but declined  Follow-up and Dispositions    · Return in about 3 months (around 6/4/2021) for Medication follow up.             HISTORY OF PRESENT ILLNESS:  Bianca Styles is a 81 y.o. female with history of chronic lumbar pain and presents to the office today for follow up. She complains of aching pain in the right shoulder after she helped her  lift something heavy. Pt reports intermittent lower back pain but denies any change in her symptoms since her last office visit. She continues to take Tylenol #3 1 tab BID-TID as her pain warrants. Of note, patient's MME/day is 9-13.5. Pt at this time desires to continue with current care    She will be leaving on Wednesdays for a 5 day trip to [place]. Pt notes that she received her first dose of the COVID-19 vaccine and is scheduled for her 2nd dose at the end of the month.     Pain Scale: 0 - No pain/10    PCP: Rodger Bell DO     Past Medical History:   Diagnosis Date   • Anemia NEC    • Aneurysm (HCC)     left kidney   • Arthritis    • Asthma    • Cardiac catheterization 02/23/2015    R dominant.  LM patent.  mLAD 20%.  CX patent.  mRCA 20%.  LVEDP 8.  EF 60%.  Mild AS.  RA 3/1.  PA 24.  W 6/5.  CI 2.94.   • Cardiac echocardiogram 07/27/2016    EF 60-65%.  No WMA.  Mild LVH.  Normal LV diastolic fx.  Mild LAE.  Mod-severe AS (mean grad 34 mmHg).  AS has worsened since study of 1/20/15.  Mild MR.     • Cardiac nuclear imaging test 02/10/2015    No evidence of ischemia or prior infarction.  Hyperdynamic LV fx.  EF >70%.  No RWMA.  Nondiagnostic EKG on pharm stress test.  Low risk.   • Carotid duplex 01/12/2016    Mild < 50% bilateral ICA stenosis.     • Chest tightness     exertional, in the setting of widely patent coronary arteries, possibly related to elevated left ventricular end-diastolic pressure or possibly to small-vessel disease, improved on Cardizem CD in place of Verapamil   • Chronic kidney disease, stage III (moderate)    Essential hypertension     Glaucoma     Heart murmur     Hypercholesterolemia     Hypoglycemia     Raynaud's syndrome     Renal cell carcinoma (HCC)     Stage T1a Furhman Grade 2 s/p right open partial nephrectomy 2/22/10      S/p nephrectomy 2/22/10    right    Scoliosis     Stroke [facility]) 2/2015    resid los of balance        Social History     Socioeconomic History    Marital status:      Spouse name: Not on file    Number of children: Not on file    Years of education: Not on file    Highest education level: Not on file   Occupational History    Not on file   Social Needs    Financial resource strain: Not on file    Food insecurity     Worry: Not on file     Inability: Not on file   [employer] needs     Medical: Not on file     Non-medical: Not on file   Tobacco Use    Smoking status: Never Smoker    Smokeless tobacco: Never Used   Substance and Sexual Activity    Alcohol use: No     Alcohol/week: 0.0 standard drinks    Drug use: No    Sexual activity: Yes     Partners: Male   Lifestyle    Physical activity     Days per week: Not on file     Minutes per session: Not on file    Stress: Not on file   Relationships    Social connections     Talks on phone: Not on file     Gets together: Not on file     Attends Orthodoxy service: Not on file     Active member of club or organization: Not on file     Attends meetings of clubs or organizations: Not on file     Relationship status: Not on file    Intimate partner violence     Fear of current or ex partner: Not on file     Emotionally abused: Not on file     Physically abused: Not on file     Forced sexual activity: Not on file   Other Topics Concern    Not on file   Social History Narrative    Not on file       Current Outpatient Medications   Medication Sig Dispense Refill    [START ON 3/15/2021] acetaminophen-codeine (TYLENOL #3) 300-30 mg per tablet Take 1 Tab by mouth as needed (TID prn chronic pain) for up to 30 days.  Max Daily Amount: 2 Tabs. 90 Tab 2    cyanocobalamin (VITAMIN B12) 100 mcg tablet Take 100 mcg by mouth daily.  losartan (COZAAR) 100 mg tablet Take 1 Tab by mouth daily. 90 Tab 3    furosemide (LASIX) 20 mg tablet Take 20 mg by mouth daily.  amLODIPine (NORVASC) 5 mg tablet Take 5 mg by mouth daily.  LINZESS 145 mcg cap capsule Take 145 mcg by mouth daily.  aspirin delayed-release 81 mg tablet Take 81 mg by mouth daily.  atorvastatin (LIPITOR) 80 mg tablet Take 1 Tab by mouth daily. 90 Tab 3    Cholecalciferol, Vitamin D3, 50,000 unit cap Take 1 Cap by mouth every seven (7) days.  PROPYLENE GLYCOL/ (SYSTANE OP) Apply  to eye as needed.  albuterol (PROVENTIL HFA, VENTOLIN HFA) 90 mcg/actuation inhaler Take 2 Puffs by inhalation as needed.  cetirizine (ZYRTEC) 10 mg tablet Take 10 mg by mouth as needed.  acetaminophen-codeine (TYLENOL #3) 300-30 mg per tablet Take 1 Tab by mouth as needed (TID prn chronic pain) for up to 30 days. Max Daily Amount: 2 Tabs. 90 Tab 2    acetaminophen-codeine (TYLENOL #3) 300-30 mg per tablet TAKE 1 TABLET BY MOUTH AS NEEDED(BID AS NEEDED CHRONIC PAIN) FOR UP TO 30 DAYS 60 Tab 1       Allergies   Allergen Reactions    Vicodin [Hydrocodone-Acetaminophen] Nausea and Vomiting         REVIEW OF SYSTEMS    Constitutional: Negative for fever, chills, or weight change. Respiratory: Negative for cough or shortness of breath. Cardiovascular: Negative for chest pain or palpitations. Gastrointestinal: Negative for acid reflux, change in bowel habits, or constipation. Genitourinary: Negative for dysuria and flank pain. Musculoskeletal: Positive for lumbar and right shoulder pain. Neurological: Negative for headaches, dizziness, or numbness. Endo/Heme/Allergies: Negative for increased bruising. Psychiatric/Behavioral: Positive for difficulty with sleep.     As per HPI    PHYSICAL EXAMINATION  Visit Vitals  /67 (BP 1 Location: Left upper arm)   Pulse 82   Temp 97.5 °F (36.4 °C)   Resp 17   Ht 5' 1\" (1.549 m)   Wt 117 lb (53.1 kg)   SpO2 99%   BMI 22.11 kg/m²       Constitutional: Awake, alert, and in no acute distress. Neurological: 1+ symmetrical DTRs in the upper extremities. 1+ symmetrical DTRs in the lower extremities. Sensation to light touch is intact. Negative Mariano's sign bilaterally. Skin: warm, dry, and intact. Musculoskeletal: Pain and limitation of ROM with abduction of the right shoulder. Good range of motion of the left shoulder. Tenderness to palpation in the lower lumbar region. Moderate pain with extension and axial loading. No pain with internal or external rotation of her hips. Negative straight leg raise bilaterally. Biceps  Triceps Deltoids Wrist Ext Wrist Flex Hand Intrin   Right 4/5 4/5 4/5 4/5 4/5 4/5   Left 4/5 4/5 4/5 4/5 4/5 4/5      Hip Flex  Quads Hamstrings Ankle DF EHL Ankle PF   Right +4/5 +4/5 +4/5 +4/5 +4/5 +4/5   Left +4/5 +4/5 +4/5 +4/5 +4/5 +4/5     IMAGING:    Lumbar spine MRI from 03/16/2016 was personally reviewed with the patient and demonstrated:  Results from [facility] on 03/16/16   MRI LUMB SPINE W WO CONT     Narrative MRI lumbar spine with and without contrast    COMPARISON: CT chest abdomen and pelvis October 6, 2011    CLINICAL INFORMATION: Progressive back pain, left leg pain. PROCEDURE:    MRI of the lumbar spine was performed prior to and following the uneventful  administration of 11 cc of Magnevist venous leak. Sequences included: Localizer,  sagittal T1, sagittal T1 postcontrast with fat saturation, sagittal inversion  recovery, sagittal T2, axial T1, axial T1 postcontrast, and axial T2    FINDINGS:  There is mild apex right scoliosis of the lumbar spine, with mild apex left  scoliosis of the thoracolumbar junction. 4 mm of grade 1 anterior listhesis of  L4 on L5. Vertebral body heights are preserved. No fracture.  Minimal multilevel  small osteophyte formation. Mild disc narrowing and desiccation throughout the lumbar spine. Conus terminates at the L1-2 level. Mild degenerative changes present in the sacroiliac joints. Patient has  undergone previous partial left nephrectomy. High T2 signal foci in the kidneys,  incompletely evaluated on this study. High T2 signal focus in the posterior  right hepatic lobe is also possibly a cyst, but incompletely evaluated. 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       Correlation of axial and sagittal images reveals the following:    At L1-L2: No significant disc pathology or proliferative changes. No central  canal or foraminal stenosis. At L2-L3: Mild bulging of the posterolateral disc corners, with mild facet  arthropathy and ligamentous buckling. Trace facet joint effusions. Canal is  patent. Mild right greater than left foraminal narrowing. At L3-L4: Mild bulging of the posterolateral disc corners, with mild facet  arthropathy and ligamentous buckling. Trace right facet joint effusion. Canal is  patent. Mild bilateral foraminal narrowing. At L4-L5: Uncovering of the disc due to the listhesis, with diffuse disc bulge. Mild to moderate facet arthropathy. Canal is patent.  Susceptibility artifact in  the posterior soft tissues. Mild bilateral foraminal narrowing. Question prior  left hemilaminectomy. At L5-S1: Small posterior disc bulge, with mild facet arthropathy. Canal is  patent. No significant foraminal narrowing.              Impression IMPRESSION:    Thoracolumbar scoliosis. Grade 1 anterior listhesis of L4 on L5. Multilevel  degenerative changes, with overall patent canal. Multilevel mild foraminal  narrowing. Question prior left hemilaminectomy at L4. High T2 signal foci in the kidneys, incompletely evaluated on this study.  High  T2 signal focus in the posterior right hepatic lobe is also possibly a cyst, but  incompletely evaluated.        Written by Rajeev Vazquez, as dictated by Linh Lora MD.  I, Dr. Linh Lora confirm that all documentation is accurate.

## 2021-03-17 ENCOUNTER — TELEPHONE (OUTPATIENT)
Dept: ORTHOPEDIC SURGERY | Age: 81
End: 2021-03-17

## 2021-03-17 NOTE — TELEPHONE ENCOUNTER
[facility] pharmacy needs to confirm dosing for Tylenol 3, please clarify instructions \"Take 1 Tab by mouth as needed (TID prn chronic pain) for up to 30 days.  Max Daily Amount: 2 Tabs\"    Pharmacy # [phone]

## 2021-03-17 NOTE — TELEPHONE ENCOUNTER
Returned call to patient's [facility] pharmacy. Spoke with Claudio, informed of message below as per DIANE Calderon. Claudio verbalized agreement/understanding. No further action required at this time.

## 2021-06-09 NOTE — PROGRESS NOTES
[facility]  [address] [facility], [facility]   Phone: [phone]  Fax: [phone]    Pt's YOB: 1940    ASSESSMENT   Diagnoses and all orders for this visit:    1. Other chronic pain  -     acetaminophen-codeine (TYLENOL #3) 300-30 mg per tablet; Take 1 Tablet by mouth as needed (TID prn chronic pain) for up to 30 days. Max Daily Amount: 2 Tablets. 2. Use of opiates for therapeutic purposes  -     acetaminophen-codeine (TYLENOL #3) 300-30 mg per tablet; Take 1 Tablet by mouth as needed (TID prn chronic pain) for up to 30 days. Max Daily Amount: 2 Tablets. 3. Lumbar facet arthropathy  -     acetaminophen-codeine (TYLENOL #3) 300-30 mg per tablet; Take 1 Tablet by mouth as needed (TID prn chronic pain) for up to 30 days. Max Daily Amount: 2 Tablets. 4. Chronic right shoulder pain    5. Stage 3 chronic kidney disease, unspecified whether stage 3a or 3b CKD ([facility] [address]    6. S/p right nephrectomy for cancer in 1/10    7. Balance problem         IMPRESSION AND PLAN:  Sonja Neal is a 80 y.o. female with history of chronic lumbar pain. Pt complains of continued pain in the lower back and denies any change since her last office visit. She takes Tylenol #3 BID-TID as her pain warrants with benefit. 1) Pt was given information on lumbar arthritis exercises. 2) She received a refill of Tylenol #3 1 tab TID prn chronic pain. 3) Pt is not a candidate for NSAID's due to renal disease. 4) She was offered a referral to balance physical therapy but deferred. Pt will contact the office if she wishes to proceed with a referral.   5) Ms. Shook Has has a reminder for a \"due or due soon\" health maintenance. I have asked that she contact her primary care provider, Fady Fofana DO, for follow-up on this health maintenance. 6)  demonstrated consistency with prescribing. 7) Last UDS from 3/4/2021 was consistent.   Follow-up and Dispositions · Return in about 3 months (around 9/10/2021) for Medication follow up. HISTORY OF PRESENT ILLNESS:  Herve Graves is a 80 y.o. female with history of chronic lumbar pain and presents to the office today for follow up. Pt complains of continued pain in the lower back and denies any change since her last office visit. She denies any weakness in the legs at this time. Pt admits to occasional issues with balance and notes that she generally ambulates with the assistance of a single point cane. Pt notes that her pain is generally well managed depending on the humidity and weather. She takes Tylenol #3 BID-TID as her pain warrants with benefit. Pt notes that she received her 2nd dose of the COVID-19 vaccination since her last office visit. Pt at this time desires to continue with current care. Of note, she has been  for 50+ years. She recently took a trip to [place] and has plans to return in a couple months. Pt notes that she was the  for [employer] before she retired a few years ago. Pain Scale: 9/10    PCP: Ragini Tyson DO     Past Medical History:   Diagnosis Date    Anemia NEC     Aneurysm ([facility] [address]     left kidney    Arthritis     Asthma     Cardiac catheterization 02/23/2015    R dominant. LM patent. mLAD 20%. CX patent. mRCA 20%. LVEDP 8.  EF 60%. Mild AS. RA 3/1. PA 24. W 6/5.  CI 2.94.    Cardiac echocardiogram 07/27/2016    EF 60-65%. No WMA. Mild LVH. Normal LV diastolic fx. Mild LAE. Mod-severe AS (mean grad 34 mmHg). AS has worsened since study of 1/20/15. Mild MR.  Cardiac nuclear imaging test 02/10/2015    No evidence of ischemia or prior infarction. Hyperdynamic LV fx.  EF >70%. No RWMA. Nondiagnostic EKG on pharm stress test.  Low risk.  Carotid duplex 01/12/2016    Mild < 50% bilateral ICA stenosis.       Chest tightness     exertional, in the setting of widely patent coronary arteries, possibly related to elevated left ventricular end-diastolic pressure or possibly to small-vessel disease, improved on Cardizem CD in place of Verapamil    Chronic kidney disease, stage III (moderate) (HCC)     Essential hypertension     Glaucoma     Heart murmur     Hypercholesterolemia     Hypoglycemia     Raynaud's syndrome     Renal cell carcinoma (HCC)     Stage T1a Furhman Grade 2 s/p right open partial nephrectomy 2/22/10      S/p nephrectomy 2/22/10    right    Scoliosis     Stroke [facility]) 2/2015    resid los of balance        Social History     Socioeconomic History    Marital status:      Spouse name: Not on file    Number of children: Not on file    Years of education: Not on file    Highest education level: Not on file   Occupational History    Not on file   Tobacco Use    Smoking status: Never Smoker    Smokeless tobacco: Never Used   Substance and Sexual Activity    Alcohol use: No     Alcohol/week: 0.0 standard drinks    Drug use: No    Sexual activity: Yes     Partners: Male   Other Topics Concern    Not on file   Social History Narrative    Not on file     Social Determinants of Health     Financial Resource Strain:     Difficulty of Paying Living Expenses:    Food Insecurity:     Worried About Running Out of Food in the Last Year:     Ran Out of Food in the Last Year:    Transportation Needs:     Lack of Transportation (Medical):      Lack of Transportation (Non-Medical):    Physical Activity:     Days of Exercise per Week:     Minutes of Exercise per Session:    Stress:     Feeling of Stress :    Social Connections:     Frequency of Communication with Friends and Family:     Frequency of Social Gatherings with Friends and Family:     Attends Sabianism Services:     Active Member of Clubs or Organizations:     Attends Club or Organization Meetings:     Marital Status:    Intimate Partner Violence:     Fear of Current or Ex-Partner:     Emotionally Abused:     Physically Abused:     Sexually Abused:        Current Outpatient Medications   Medication Sig Dispense Refill    potassium chloride SR (K-TAB) 20 mEq tablet Take 1 Tablet by mouth two (2) times a day.  polyethylene glycol (MIRALAX) 17 gram/dose powder Take 17 g by mouth daily.  mirtazapine (REMERON) 15 mg tablet Take 1 Tablet by mouth daily.  ipratropium (ATROVENT) 21 mcg (0.03 %) nasal spray 1 [place] by Both Nostrils route daily as needed.  donepeziL (ARICEPT) 5 mg tablet Take 1 Tablet by mouth daily.  acetaminophen-codeine (TYLENOL #3) 300-30 mg per tablet Take 1 Tablet by mouth as needed (TID prn chronic pain) for up to 30 days. Max Daily Amount: 2 Tablets. 90 Tablet 2    cyanocobalamin (VITAMIN B12) 100 mcg tablet Take 100 mcg by mouth daily.  losartan (COZAAR) 100 mg tablet Take 1 Tab by mouth daily. 90 Tab 3    furosemide (LASIX) 20 mg tablet Take 20 mg by mouth daily.  amLODIPine (NORVASC) 5 mg tablet Take 5 mg by mouth daily.  LINZESS 145 mcg cap capsule Take 145 mcg by mouth daily.  aspirin delayed-release 81 mg tablet Take 81 mg by mouth daily.  atorvastatin (LIPITOR) 80 mg tablet Take 1 Tab by mouth daily. 90 Tab 3    Cholecalciferol, Vitamin D3, 50,000 unit cap Take 1 Cap by mouth every seven (7) days.  PROPYLENE GLYCOL/ (SYSTANE OP) Apply  to eye as needed.  albuterol (PROVENTIL HFA, VENTOLIN HFA) 90 mcg/actuation inhaler Take 2 Puffs by inhalation as needed.  cetirizine (ZYRTEC) 10 mg tablet Take 10 mg by mouth as needed.  acetaminophen-codeine (TYLENOL #3) 300-30 mg per tablet Take 1 Tab by mouth as needed (TID prn chronic pain) for up to 30 days. Max Daily Amount: 2 Tabs.  90 Tab 2    acetaminophen-codeine (TYLENOL #3) 300-30 mg per tablet TAKE 1 TABLET BY MOUTH AS NEEDED(BID AS NEEDED CHRONIC PAIN) FOR UP TO 30 DAYS 60 Tab 1       Allergies   Allergen Reactions    Vicodin [Hydrocodone-Acetaminophen] Nausea and Vomiting         REVIEW OF SYSTEMS    Constitutional: Negative for fever, chills, or weight change. Respiratory: Negative for cough or shortness of breath. Cardiovascular: Negative for chest pain or palpitations. Gastrointestinal: Negative for acid reflux, change in bowel habits, or constipation. Genitourinary: Negative for dysuria and flank pain. Musculoskeletal: Positive for lumbar pain. Neurological: Negative for headaches, dizziness, or numbness. Endo/Heme/Allergies: Negative for increased bruising. Psychiatric/Behavioral: Positive for difficulty with sleep. As per HPI    PHYSICAL EXAMINATION  Visit Vitals  BP (!) 156/70   Pulse 73   Temp 97.5 °F (36.4 °C) (Temporal)   Resp 14   Ht 5' 2\" (1.575 m)   Wt 121 lb (54.9 kg)   SpO2 98%   BMI 22.13 kg/m²       Constitutional: Awake, alert, and in no acute distress. Neurological: 1+ symmetrical DTRs in the upper extremities. 1+ symmetrical DTRs in the lower extremities. Sensation to light touch is intact. Negative Mariano's sign bilaterally. Skin: warm, dry, and intact. Musculoskeletal: Tenderness to palpation in the lower lumbar region. Moderate pain with extension and axial loading. No pain with internal or external rotation of her hips. Negative straight leg raise bilaterally. Biceps  Triceps Deltoids Wrist Ext Wrist Flex Hand Intrin   Right +4/5 +4/5 +4/5 +4/5 +4/5 +4/5   Left +4/5 +4/5 +4/5 +4/5 +4/5 +4/5      Hip Flex  Quads Hamstrings Ankle DF EHL Ankle PF   Right +4/5 +4/5 +4/5 +4/5 +4/5 +4/5   Left +4/5 +4/5 +4/5 +4/5 +4/5 +4/5     IMAGING:    Lumbar spine MRI from 03/16/2016 was personally reviewed with the patient and demonstrated:  Results from [facility] on 03/16/16   MRI LUMB SPINE W WO CONT     Narrative MRI lumbar spine with and without contrast    COMPARISON: CT chest abdomen and pelvis October 6, 2011    CLINICAL INFORMATION: Progressive back pain, left leg pain.     PROCEDURE:    MRI of the lumbar spine was performed prior to and following the uneventful  administration of 11 cc of Magnevist venous leak. Sequences included: Localizer,  sagittal T1, sagittal T1 postcontrast with fat saturation, sagittal inversion  recovery, sagittal T2, axial T1, axial T1 postcontrast, and axial T2    FINDINGS:  There is mild apex right scoliosis of the lumbar spine, with mild apex left  scoliosis of the thoracolumbar junction. 4 mm of grade 1 anterior listhesis of  L4 on L5. Vertebral body heights are preserved. No fracture. Minimal multilevel  small osteophyte formation. Mild disc narrowing and desiccation throughout the lumbar spine. Conus terminates at the L1-2 level. Mild degenerative changes present in the sacroiliac joints. Patient has  undergone previous partial left nephrectomy. High T2 signal foci in the kidneys,  incompletely evaluated on this study. High T2 signal focus in the posterior  right hepatic lobe is also possibly a cyst, but incompletely evaluated. 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       Correlation of axial and sagittal images reveals the following:    At L1-L2: No significant disc pathology or proliferative changes. No central  canal or foraminal stenosis. At L2-L3: Mild bulging of the posterolateral disc corners, with mild facet  arthropathy and ligamentous buckling. Trace facet joint effusions. Canal is  patent. Mild right greater than left foraminal narrowing. At L3-L4: Mild bulging of the posterolateral disc corners, with mild facet  arthropathy and ligamentous buckling. Trace right facet joint effusion. Canal is  patent. Mild bilateral foraminal narrowing. At L4-L5: Uncovering of the disc due to the listhesis, with diffuse disc bulge. Mild to moderate facet arthropathy. Canal is patent.  Susceptibility artifact in  the posterior soft tissues. Mild bilateral foraminal narrowing. Question prior  left hemilaminectomy. At L5-S1: Small posterior disc bulge, with mild facet arthropathy. Canal is  patent.  No significant foraminal narrowing.              Impression IMPRESSION:    Thoracolumbar scoliosis. Grade 1 anterior listhesis of L4 on L5. Multilevel  degenerative changes, with overall patent canal. Multilevel mild foraminal  narrowing. Question prior left hemilaminectomy at L4. High T2 signal foci in the kidneys, incompletely evaluated on this study. High  T2 signal focus in the posterior right hepatic lobe is also possibly a cyst, but  incompletely evaluated.       Written by Mónica Maradiaga, as dictated by Hoa Loya MD.  I, Dr. Hoa Loya confirm that all documentation is accurate.

## 2021-06-10 ENCOUNTER — OFFICE VISIT (OUTPATIENT)
Dept: ORTHOPEDIC SURGERY | Age: 81
End: 2021-06-10
Payer: MEDICARE

## 2021-06-10 VITALS
RESPIRATION RATE: 14 BRPM | WEIGHT: 121 LBS | TEMPERATURE: 97.5 F | BODY MASS INDEX: 22.26 KG/M2 | OXYGEN SATURATION: 98 % | SYSTOLIC BLOOD PRESSURE: 156 MMHG | HEIGHT: 62 IN | HEART RATE: 73 BPM | DIASTOLIC BLOOD PRESSURE: 70 MMHG

## 2021-06-10 DIAGNOSIS — R26.89 BALANCE PROBLEM: ICD-10-CM

## 2021-06-10 DIAGNOSIS — Z79.891 USE OF OPIATES FOR THERAPEUTIC PURPOSES: ICD-10-CM

## 2021-06-10 DIAGNOSIS — G89.29 CHRONIC RIGHT SHOULDER PAIN: ICD-10-CM

## 2021-06-10 DIAGNOSIS — Z90.5 S/P NEPHRECTOMY: ICD-10-CM

## 2021-06-10 DIAGNOSIS — G89.29 OTHER CHRONIC PAIN: Primary | ICD-10-CM

## 2021-06-10 DIAGNOSIS — M25.511 CHRONIC RIGHT SHOULDER PAIN: ICD-10-CM

## 2021-06-10 DIAGNOSIS — M47.816 LUMBAR FACET ARTHROPATHY: ICD-10-CM

## 2021-06-10 DIAGNOSIS — N18.30 STAGE 3 CHRONIC KIDNEY DISEASE, UNSPECIFIED WHETHER STAGE 3A OR 3B CKD (HCC): ICD-10-CM

## 2021-06-10 PROBLEM — K21.9 GASTROESOPHAGEAL REFLUX DISEASE: Status: ACTIVE | Noted: 2021-06-10

## 2021-06-10 PROBLEM — R55 SYNCOPE: Status: ACTIVE | Noted: 2021-06-10

## 2021-06-10 PROBLEM — J30.9 ALLERGIC RHINITIS: Status: ACTIVE | Noted: 2021-06-10

## 2021-06-10 PROBLEM — I35.9 AORTIC VALVE DISORDER: Status: ACTIVE | Noted: 2018-03-20

## 2021-06-10 PROBLEM — R06.00 DYSPNEA: Status: ACTIVE | Noted: 2021-06-10

## 2021-06-10 PROBLEM — M25.559 HIP PAIN: Status: ACTIVE | Noted: 2021-06-10

## 2021-06-10 PROBLEM — M54.50 LOW BACK PAIN: Status: ACTIVE | Noted: 2021-06-10

## 2021-06-10 PROBLEM — F33.0 MILD RECURRENT MAJOR DEPRESSION (HCC): Status: ACTIVE | Noted: 2021-02-24

## 2021-06-10 PROBLEM — R63.4 UNEXPLAINED WEIGHT LOSS: Status: ACTIVE | Noted: 2021-06-10

## 2021-06-10 PROBLEM — H40.9 GLAUCOMA: Status: ACTIVE | Noted: 2021-06-10

## 2021-06-10 PROBLEM — I47.1 SUPRAVENTRICULAR TACHYCARDIA (HCC): Status: ACTIVE | Noted: 2017-05-23

## 2021-06-10 PROBLEM — R73.9 HYPERGLYCEMIA: Status: ACTIVE | Noted: 2021-04-26

## 2021-06-10 PROBLEM — R73.09 ABNORMAL GLUCOSE LEVEL: Status: ACTIVE | Noted: 2021-06-10

## 2021-06-10 PROBLEM — R00.2 PALPITATIONS: Status: ACTIVE | Noted: 2021-06-10

## 2021-06-10 PROBLEM — M70.60 TROCHANTERIC BURSITIS: Status: ACTIVE | Noted: 2021-06-10

## 2021-06-10 PROBLEM — Z86.73 HISTORY OF CEREBROVASCULAR ACCIDENT: Status: ACTIVE | Noted: 2021-02-24

## 2021-06-10 PROBLEM — M18.9 OSTEOARTHRITIS OF CARPOMETACARPAL (CMC) JOINT OF THUMB: Status: ACTIVE | Noted: 2019-05-08

## 2021-06-10 PROBLEM — M25.559 GREATER TROCHANTERIC PAIN SYNDROME: Status: ACTIVE | Noted: 2021-06-10

## 2021-06-10 PROBLEM — M12.9 ARTHROPATHY: Status: ACTIVE | Noted: 2021-06-10

## 2021-06-10 PROBLEM — E55.9 VITAMIN D DEFICIENCY: Status: ACTIVE | Noted: 2021-06-10

## 2021-06-10 PROBLEM — R63.4 UNINTENTIONAL WEIGHT LOSS: Status: ACTIVE | Noted: 2021-06-10

## 2021-06-10 PROBLEM — Z95.2 HISTORY OF ARTIFICIAL HEART VALVE: Status: ACTIVE | Noted: 2020-10-21

## 2021-06-10 PROBLEM — E78.5 HYPERLIPIDEMIA: Status: ACTIVE | Noted: 2021-06-10

## 2021-06-10 PROBLEM — F09 MILD COGNITIVE DISORDER: Status: ACTIVE | Noted: 2021-02-24

## 2021-06-10 PROBLEM — R76.8 ANTINUCLEAR FACTOR POSITIVE: Status: ACTIVE | Noted: 2021-06-10

## 2021-06-10 PROBLEM — C44.621 SQUAMOUS CELL CARCINOMA OF UPPER EXTREMITY: Status: ACTIVE | Noted: 2020-02-07

## 2021-06-10 PROBLEM — G45.9 TRANSIENT ISCHEMIC ATTACK: Status: ACTIVE | Noted: 2021-06-10

## 2021-06-10 PROBLEM — M81.0 OSTEOPOROSIS: Status: ACTIVE | Noted: 2021-06-10

## 2021-06-10 PROBLEM — I73.00 RAYNAUD'S DISEASE: Status: ACTIVE | Noted: 2021-06-10

## 2021-06-10 PROBLEM — Z01.810 ENCOUNTER FOR PREPROCEDURAL CARDIOVASCULAR EXAMINATION: Status: ACTIVE | Noted: 2021-06-10

## 2021-06-10 PROCEDURE — G8754 DIAS BP LESS 90: HCPCS | Performed by: PHYSICAL MEDICINE & REHABILITATION

## 2021-06-10 PROCEDURE — G8536 NO DOC ELDER MAL SCRN: HCPCS | Performed by: PHYSICAL MEDICINE & REHABILITATION

## 2021-06-10 PROCEDURE — 99213 OFFICE O/P EST LOW 20 MIN: CPT | Performed by: PHYSICAL MEDICINE & REHABILITATION

## 2021-06-10 PROCEDURE — G8420 CALC BMI NORM PARAMETERS: HCPCS | Performed by: PHYSICAL MEDICINE & REHABILITATION

## 2021-06-10 PROCEDURE — G8753 SYS BP > OR = 140: HCPCS | Performed by: PHYSICAL MEDICINE & REHABILITATION

## 2021-06-10 PROCEDURE — 1100F PTFALLS ASSESS-DOCD GE2>/YR: CPT | Performed by: PHYSICAL MEDICINE & REHABILITATION

## 2021-06-10 PROCEDURE — G9717 DOC PT DX DEP/BP F/U NT REQ: HCPCS | Performed by: PHYSICAL MEDICINE & REHABILITATION

## 2021-06-10 PROCEDURE — 1090F PRES/ABSN URINE INCON ASSESS: CPT | Performed by: PHYSICAL MEDICINE & REHABILITATION

## 2021-06-10 PROCEDURE — G8427 DOCREV CUR MEDS BY ELIG CLIN: HCPCS | Performed by: PHYSICAL MEDICINE & REHABILITATION

## 2021-06-10 PROCEDURE — 3288F FALL RISK ASSESSMENT DOCD: CPT | Performed by: PHYSICAL MEDICINE & REHABILITATION

## 2021-06-10 RX ORDER — POLYETHYLENE GLYCOL 3350 17 G/17G
17 POWDER, FOR SOLUTION ORAL DAILY
COMMUNITY

## 2021-06-10 RX ORDER — IPRATROPIUM BROMIDE 21 UG/1
1 SPRAY, METERED NASAL
COMMUNITY

## 2021-06-10 RX ORDER — POTASSIUM CHLORIDE 1500 MG/1
1 TABLET, FILM COATED, EXTENDED RELEASE ORAL 2 TIMES DAILY
COMMUNITY
Start: 2021-05-19

## 2021-06-10 RX ORDER — MIRTAZAPINE 15 MG/1
1 TABLET, FILM COATED ORAL DAILY
COMMUNITY

## 2021-06-10 RX ORDER — ACETAMINOPHEN AND CODEINE PHOSPHATE 300; 30 MG/1; MG/1
1 TABLET ORAL AS NEEDED
Qty: 90 TABLET | Refills: 2 | Status: ON HOLD | OUTPATIENT
Start: 2021-06-10 | End: 2021-11-03

## 2021-06-10 RX ORDER — DONEPEZIL HYDROCHLORIDE 5 MG/1
1 TABLET, FILM COATED ORAL DAILY
COMMUNITY

## 2021-06-10 NOTE — PATIENT INSTRUCTIONS
Low Back Arthritis: Exercises Introduction Here are some examples of typical rehabilitation exercises for your condition. Start each exercise slowly. Ease off the exercise if you start to have pain. Your doctor or physical therapist will tell you when you can start these exercises and which ones will work best for you. When you are not being active, find a comfortable position for rest. Some people are comfortable on the floor or a medium-firm bed with a small pillow under their head and another under their knees. Some people prefer to lie on their side with a pillow between their knees. Don't stay in one position for too long. Take short walks (10 to 20 minutes) every 2 to 3 hours. Avoid slopes, hills, and stairs until you feel better. Walk only distances you can manage without pain, especially leg pain. How to do the exercises Pelvic tilt 1. Lie on your back with your knees bent. 2. \"Brace\" your stomachtighten your muscles by pulling in and imagining your belly button moving toward your spine. 3. Press your lower back into the floor. You should feel your hips and pelvis rock back. 4. Hold for 6 seconds while breathing smoothly. 5. Relax and allow your pelvis and hips to rock forward. 6. Repeat 8 to 12 times. Back stretches 1. Get down on your hands and knees on the floor. 2. Relax your head and allow it to droop. Round your back up toward the ceiling until you feel a nice stretch in your upper, middle, and lower back. Hold this stretch for as long as it feels comfortable, or about 15 to 30 seconds. 3. Return to the starting position with a flat back while you are on your hands and knees. 4. Let your back sway by pressing your stomach toward the floor. Lift your buttocks toward the ceiling. 5. Hold this position for 15 to 30 seconds. 6. Repeat 2 to 4 times. Follow-up care is a key part of your treatment and safety.  Be sure to make and go to all appointments, and call your doctor if you are having problems. It's also a good idea to know your test results and keep a list of the medicines you take. Where can you learn more? Go to http://www.gray.com/ Enter J243 in the search box to learn more about \"Low Back Arthritis: Exercises. \" Current as of: November 16, 2020               Content Version: 12.8 © 4052-3330 [employer]. Care instructions adapted under license by [employer] (which disclaims liability or warranty for this information). If you have questions about a medical condition or this instruction, always ask your healthcare professional. [place] [address] any warranty or liability for your use of this information.

## 2021-06-10 NOTE — LETTER
6/11/2021 Patient: Saurav Grant YOB: 1940 Date of Visit: 6/10/2021 Patrice Resendez, [address] [phone] Via Fax: [phone] Dear Patrice Resendez DO, Thank you for referring Ms. Mariia Sullivan to [facility] for evaluation. My notes for this consultation are attached. If you have questions, please do not hesitate to call me. I look forward to following your patient along with you. Sincerely, Becka Ruiz MD

## 2021-07-09 ENCOUNTER — TRANSCRIBE ORDER (OUTPATIENT)
Dept: SCHEDULING | Age: 81
End: 2021-07-09

## 2021-07-09 DIAGNOSIS — M48.061 SPINAL STENOSIS, LUMBAR REGION, WITHOUT NEUROGENIC CLAUDICATION: Primary | ICD-10-CM

## 2021-07-09 DIAGNOSIS — M54.10 RADICULAR SYNDROME OF LOWER LIMBS: ICD-10-CM

## 2021-07-10 PROBLEM — Z01.810 ENCOUNTER FOR PREPROCEDURAL CARDIOVASCULAR EXAMINATION: Status: RESOLVED | Noted: 2021-06-10 | Resolved: 2021-07-10

## 2021-07-26 ENCOUNTER — HOSPITAL ENCOUNTER (OUTPATIENT)
Age: 81
Discharge: HOME | End: 2021-07-26
Attending: PHYSICIAN ASSISTANT
Payer: MEDICARE

## 2021-07-26 DIAGNOSIS — M48.061 SPINAL STENOSIS, LUMBAR REGION, WITHOUT NEUROGENIC CLAUDICATION: ICD-10-CM

## 2021-07-26 DIAGNOSIS — M54.10 RADICULAR SYNDROME OF LOWER LIMBS: ICD-10-CM

## 2021-07-26 PROCEDURE — 72148 MRI LUMBAR SPINE W/O DYE: CPT

## 2021-08-03 PROBLEM — I10 ESSENTIAL HYPERTENSION: Status: RESOLVED | Noted: 2021-08-03 | Resolved: 2021-08-03

## 2021-08-03 PROBLEM — E78.5 HYPERLIPIDEMIA: Status: RESOLVED | Noted: 2021-06-10 | Resolved: 2021-08-03

## 2021-10-21 ENCOUNTER — OFFICE VISIT (OUTPATIENT)
Dept: ORTHOPEDIC SURGERY | Age: 81
End: 2021-10-21
Payer: MEDICARE

## 2021-10-21 VITALS
TEMPERATURE: 98.6 F | WEIGHT: 112.6 LBS | BODY MASS INDEX: 20.72 KG/M2 | HEIGHT: 62 IN | OXYGEN SATURATION: 100 % | HEART RATE: 82 BPM

## 2021-10-21 DIAGNOSIS — M47.816 LUMBAR FACET ARTHROPATHY: ICD-10-CM

## 2021-10-21 DIAGNOSIS — Z79.891 USE OF OPIATES FOR THERAPEUTIC PURPOSES: ICD-10-CM

## 2021-10-21 DIAGNOSIS — M62.838 MUSCLE SPASM: ICD-10-CM

## 2021-10-21 DIAGNOSIS — G89.29 OTHER CHRONIC PAIN: Primary | ICD-10-CM

## 2021-10-21 DIAGNOSIS — N18.30 STAGE 3 CHRONIC KIDNEY DISEASE, UNSPECIFIED WHETHER STAGE 3A OR 3B CKD (HCC): ICD-10-CM

## 2021-10-21 DIAGNOSIS — G89.29 OTHER CHRONIC PAIN: ICD-10-CM

## 2021-10-21 DIAGNOSIS — R26.89 BALANCE PROBLEM: ICD-10-CM

## 2021-10-21 PROCEDURE — 99214 OFFICE O/P EST MOD 30 MIN: CPT | Performed by: PHYSICAL MEDICINE & REHABILITATION

## 2021-10-21 PROCEDURE — G8536 NO DOC ELDER MAL SCRN: HCPCS | Performed by: PHYSICAL MEDICINE & REHABILITATION

## 2021-10-21 PROCEDURE — G8420 CALC BMI NORM PARAMETERS: HCPCS | Performed by: PHYSICAL MEDICINE & REHABILITATION

## 2021-10-21 PROCEDURE — 1090F PRES/ABSN URINE INCON ASSESS: CPT | Performed by: PHYSICAL MEDICINE & REHABILITATION

## 2021-10-21 PROCEDURE — 3288F FALL RISK ASSESSMENT DOCD: CPT | Performed by: PHYSICAL MEDICINE & REHABILITATION

## 2021-10-21 PROCEDURE — G8756 NO BP MEASURE DOC: HCPCS | Performed by: PHYSICAL MEDICINE & REHABILITATION

## 2021-10-21 PROCEDURE — G8427 DOCREV CUR MEDS BY ELIG CLIN: HCPCS | Performed by: PHYSICAL MEDICINE & REHABILITATION

## 2021-10-21 PROCEDURE — 1100F PTFALLS ASSESS-DOCD GE2>/YR: CPT | Performed by: PHYSICAL MEDICINE & REHABILITATION

## 2021-10-21 PROCEDURE — G9717 DOC PT DX DEP/BP F/U NT REQ: HCPCS | Performed by: PHYSICAL MEDICINE & REHABILITATION

## 2021-10-21 RX ORDER — ACETAMINOPHEN AND CODEINE PHOSPHATE 300; 60 MG/1; MG/1
1 TABLET ORAL
Qty: 90 TABLET | Refills: 2 | Status: SHIPPED | OUTPATIENT
Start: 2021-10-21 | End: 2022-01-31 | Stop reason: SDUPTHER

## 2021-10-21 RX ORDER — ACETAMINOPHEN AND CODEINE PHOSPHATE 300; 30 MG/1; MG/1
1 TABLET ORAL AS NEEDED
Qty: 90 TABLET | Refills: 2 | Status: CANCELLED | OUTPATIENT
Start: 2021-10-21 | End: 2021-11-20

## 2021-10-21 RX ORDER — METHYLPREDNISOLONE 4 MG/1
TABLET ORAL
Qty: 1 DOSE PACK | Refills: 0 | Status: SHIPPED | OUTPATIENT
Start: 2021-10-21

## 2021-10-21 NOTE — LETTER
10/22/2021    Patient: Loyd Mckeon   YOB: 1940   Date of Visit: 10/21/2021     Elvis Casas, [address] [place] [id number]  Via Fax: [phone]    Dear Elvis Casas, DO,      Thank you for referring Ms. Emilee Gonzalez to [address] [place] [address] for evaluation. My notes for this consultation are attached. If you have questions, please do not hesitate to call me. I look forward to following your patient along with you.       Sincerely,    David De León MD

## 2021-10-21 NOTE — PATIENT INSTRUCTIONS
Low Back Arthritis: Exercises  Introduction  Here are some examples of typical rehabilitation exercises for your condition. Start each exercise slowly. Ease off the exercise if you start to have pain. Your doctor or physical therapist will tell you when you can start these exercises and which ones will work best for you. When you are not being active, find a comfortable position for rest. Some people are comfortable on the floor or a medium-firm bed with a small pillow under their head and another under their knees. Some people prefer to lie on their side with a pillow between their knees. Don't stay in one position for too long. Take short walks (10 to 20 minutes) every 2 to 3 hours. Avoid slopes, hills, and stairs until you feel better. Walk only distances you can manage without pain, especially leg pain. How to do the exercises  Pelvic tilt    1. Lie on your back with your knees bent. 2. \"Brace\" your stomachtighten your muscles by pulling in and imagining your belly button moving toward your spine. 3. Press your lower back into the floor. You should feel your hips and pelvis rock back. 4. Hold for 6 seconds while breathing smoothly. 5. Relax and allow your pelvis and hips to rock forward. 6. Repeat 8 to 12 times. Back stretches    1. Get down on your hands and knees on the floor. 2. Relax your head and allow it to droop. Round your back up toward the ceiling until you feel a nice stretch in your upper, middle, and lower back. Hold this stretch for as long as it feels comfortable, or about 15 to 30 seconds. 3. Return to the starting position with a flat back while you are on your hands and knees. 4. Let your back sway by pressing your stomach toward the floor. Lift your buttocks toward the ceiling. 5. Hold this position for 15 to 30 seconds. 6. Repeat 2 to 4 times. Follow-up care is a key part of your treatment and safety.  Be sure to make and go to all appointments, and call your doctor if you are having problems. It's also a good idea to know your test results and keep a list of the medicines you take. Where can you learn more? Go to http://www.EachNet.com/  Enter T094 in the search box to learn more about \"Low Back Arthritis: Exercises. \"  Current as of: July 1, 2021               Content Version: 13.0  © 6524-2714 [employer]. Care instructions adapted under license by [employer] (which disclaims liability or warranty for this information). If you have questions about a medical condition or this instruction, always ask your healthcare professional. [place] [address] any warranty or liability for your use of this information.

## 2021-10-21 NOTE — PROGRESS NOTES
[facility]  [address] [facility], [facility] [place]   Phone: [phone]  Fax: [phone]    Pt's YOB: 1940    ASSESSMENT   Diagnoses and all orders for this visit:    1. Other chronic pain  -     acetaminophen-codeine (Tylenol-Codeine #4) 300-60 mg tab; Take 1 Tablet by mouth every eight (8) hours as needed for Pain for up to 30 days. Max Daily Amount: 3 Tablets. -     DRUG SCREEN UR - W/ CONFIRM; Future    2. Use of opiates for therapeutic purposes  -     acetaminophen-codeine (Tylenol-Codeine #4) 300-60 mg tab; Take 1 Tablet by mouth every eight (8) hours as needed for Pain for up to 30 days. Max Daily Amount: 3 Tablets. -     DRUG SCREEN UR - W/ CONFIRM; Future    3. Lumbar facet arthropathy  -     SCHEDULE SURGERY  -     methylPREDNISolone (MEDROL DOSEPACK) 4 mg tablet; Per dose pack instructions    4. Muscle spasm  -     SCHEDULE SURGERY    5. Balance problem    6. Stage 3 chronic kidney disease, unspecified whether stage 3a or 3b CKD ([facility] [address]         IMPRESSION AND PLAN:  Raghavendra De Leon is a 80 y.o. female with history of chronic lumbar pain. She complains of severe pain in the lower back but she denies any pain radiating down the legs at this time. Pt was takingTylenol #3 3 tabs QAM with temporary relief. 1) Pt was given information on lumbar arthritis exercises. 2) She will increase her Tylenol #3 to Tylenol #4 1 tab TID for better management of her chronic pain; MME will be 18-27 depending on the amount of medication she takes. 3) Pt is not a candidate for NSAID's due to renal disease. 4) She was scheduled for bilateral L4-5 facet injection. 5) Pt was prescribed a Medrol Dosepak. 6) Ms. Jah Sabillon has a reminder for a \"due or due soon\" health maintenance. I have asked that she contact her primary care provider, Loulou Pham DO, for follow-up on this health maintenance. 7)  demonstrated consistency with prescribing.    8) Last UDS from 3/4/2021 was consistent. Follow-up and Dispositions    · Return in about 6 weeks (around 12/2/2021) for Medication follow up, injection . HISTORY OF PRESENT ILLNESS:  Blake Mendoza is a 80 y.o. female with history of chronic lumbar pain and presents to the office today for follow up. She complains of severe pain in the lower back but she denies any pain radiating down the legs at this time. Her pain is generally worse with activity. Pt notes that she was scheduled for surgery this week by Dr. Mj Barry but had to cancel so she could take care of her  (since he recently had 2 stents placed). Pt reports minimal relief when she last had a lumbar inection 15 years ago but she has considered proceeding with another injection at this time. Pt was takingTylenol #3 2 tabs QAM and 1 tab in the afternoon but notes that over the last 1.5 years, the medication has not been as effective. She then adjusted the Tylenol #3 to 3 tabs QAM with benefit but her pain returns in the afternoon. She reports minimal relief when previously using lidocaine patches. Pt at this time desires to proceed with medication evaluation and steroid injections. Pf note, the patient's  accompanied her during the office visit and notes that she has early stages of dementia. He notes that he also manages her medications. 30 minutes of face to face contact was spent with the patient and her  during today's office visit extensively discussing her symptoms and treatment plan. Pain Scale: 4/10    PCP: Trung Loyd DO     Past Medical History:   Diagnosis Date    Anemia NEC     Aneurysm ([address]     left kidney    Arthritis     Asthma     Cardiac catheterization 02/23/2015    R dominant. LM patent. mLAD 20%. CX patent. mRCA 20%. LVEDP 8.  EF 60%. Mild AS. RA 3/1. PA 24. W 6/5.  CI 2.94.    Cardiac echocardiogram 07/27/2016    EF 60-65%. No WMA. Mild LVH. Normal LV diastolic fx. Mild LAE.   Mod-severe AS (mean grad 34 mmHg). AS has worsened since study of 1/20/15. Mild MR.  Cardiac nuclear imaging test 02/10/2015    No evidence of ischemia or prior infarction. Hyperdynamic LV fx.  EF >70%. No RWMA. Nondiagnostic EKG on pharm stress test.  Low risk.  Carotid duplex 01/12/2016    Mild < 50% bilateral ICA stenosis.  Chest tightness     exertional, in the setting of widely patent coronary arteries, possibly related to elevated left ventricular end-diastolic pressure or possibly to small-vessel disease, improved on Cardizem CD in place of Verapamil    Chronic kidney disease, stage III (moderate) (HCC)     Essential hypertension     Glaucoma     Heart murmur     Hypercholesterolemia     Hypoglycemia     Raynaud's syndrome     Renal cell carcinoma (HCC)     Stage T1a [place]hman Grade 2 s/p right open partial nephrectomy 2/22/10      S/p nephrectomy 2/22/10    right    Scoliosis     Stroke [facility]) 2/2015    resid los of balance        Social History     Socioeconomic History    Marital status:      Spouse name: Not on file    Number of children: Not on file    Years of education: Not on file    Highest education level: Not on file   Occupational History    Not on file   Tobacco Use    Smoking status: Never Smoker    Smokeless tobacco: Never Used   Substance and Sexual Activity    Alcohol use: No     Alcohol/week: 0.0 standard drinks    Drug use: No    Sexual activity: Yes     Partners: Male   Other Topics Concern    Not on file   Social History Narrative    Not on file     Social Determinants of Health     Financial Resource Strain:     Difficulty of Paying Living Expenses:    Food Insecurity:     Worried About Running Out of Food in the Last Year:     Ran Out of Food in the Last Year:    Transportation Needs:     Lack of Transportation (Medical):      Lack of Transportation (Non-Medical):    Physical Activity:     Days of Exercise per Week:     Minutes of Exercise per Session:    Stress:     Feeling of Stress :    Social Connections:     Frequency of Communication with Friends and Family:     Frequency of Social Gatherings with Friends and Family:     Attends [employer] Services:     Active Member of Clubs or Organizations:     Attends Club or Organization Meetings:     Marital Status:    Intimate Partner Violence:     Fear of Current or Ex-Partner:     Emotionally Abused:     Physically Abused:     Sexually Abused:        Current Outpatient Medications   Medication Sig Dispense Refill    acetaminophen-codeine (Tylenol-Codeine #4) 300-60 mg tab Take 1 Tablet by mouth every eight (8) hours as needed for Pain for up to 30 days. Max Daily Amount: 3 Tablets. 90 Tablet 2    methylPREDNISolone (MEDROL DOSEPACK) 4 mg tablet Per dose pack instructions 1 Dose Pack 0    potassium chloride SR (K-TAB) 20 mEq tablet Take 1 Tablet by mouth two (2) times a day.  polyethylene glycol (MIRALAX) 17 gram/dose powder Take 17 g by mouth daily.  mirtazapine (REMERON) 15 mg tablet Take 1 Tablet by mouth daily.  ipratropium (ATROVENT) 21 mcg (0.03 %) nasal spray 1 [place] by Both Nostrils route daily as needed.  donepeziL (ARICEPT) 5 mg tablet Take 1 Tablet by mouth daily.  cyanocobalamin (VITAMIN B12) 100 mcg tablet Take 100 mcg by mouth daily.  losartan (COZAAR) 100 mg tablet Take 1 Tab by mouth daily. 90 Tab 3    furosemide (LASIX) 20 mg tablet Take 20 mg by mouth daily.  amLODIPine (NORVASC) 5 mg tablet Take 5 mg by mouth daily.  LINZESS 145 mcg cap capsule Take 145 mcg by mouth daily.  aspirin delayed-release 81 mg tablet Take 81 mg by mouth daily.  atorvastatin (LIPITOR) 80 mg tablet Take 1 Tab by mouth daily. 90 Tab 3    Cholecalciferol, Vitamin D3, 50,000 unit cap Take 1 Cap by mouth every seven (7) days.  PROPYLENE GLYCOL/ (SYSTANE OP) Apply  to eye as needed.       albuterol (PROVENTIL HFA, VENTOLIN HFA) 90 mcg/actuation inhaler Take 2 Puffs by inhalation as needed.  cetirizine (ZYRTEC) 10 mg tablet Take 10 mg by mouth as needed.  acetaminophen-codeine (TYLENOL #3) 300-30 mg per tablet Take 1 Tablet by mouth as needed (TID prn chronic pain) for up to 30 days. Max Daily Amount: 2 Tablets. 90 Tablet 2    acetaminophen-codeine (TYLENOL #3) 300-30 mg per tablet Take 1 Tab by mouth as needed (TID prn chronic pain) for up to 30 days. Max Daily Amount: 2 Tabs. 90 Tab 2    acetaminophen-codeine (TYLENOL #3) 300-30 mg per tablet TAKE 1 TABLET BY MOUTH AS NEEDED(BID AS NEEDED CHRONIC PAIN) FOR UP TO 30 DAYS 60 Tab 1       Allergies   Allergen Reactions    Vicodin [Hydrocodone-Acetaminophen] Nausea and Vomiting         REVIEW OF SYSTEMS    Constitutional: Negative for fever, chills, or weight change. Respiratory: Negative for cough or shortness of breath. Cardiovascular: Negative for chest pain or palpitations. Gastrointestinal: Negative for acid reflux, change in bowel habits, or constipation. Genitourinary: Negative for dysuria and flank pain. Musculoskeletal: Positive for lumbar pain. Skin: Negative for rash. Neurological: Negative for headaches, dizziness, or numbness. Endo/Heme/Allergies: Negative for increased bruising. Psychiatric/Behavioral: Positive for difficulty with sleep. As per HPI    PHYSICAL EXAMINATION  Visit Vitals  Pulse 82   Temp 98.6 °F (37 °C)   Ht 5' 2\" (1.575 m)   Wt 112 lb 9.6 oz (51.1 kg)   SpO2 100%   BMI 20.59 kg/m²       Constitutional: Awake, alert, and in no acute distress. Neurological: Sensation to light touch is intact. Skin: warm, dry, and intact. Musculoskeletal: Tenderness to palpation in the lower lumbar region. Pain with extension. No change with forward flexion or axial loading. Difficulty transitioning from sit to stand No pain with internal or external rotation of her hips. Negative straight leg raise bilaterally.      Hip Flex  Quads Hamstrings Ankle DF EHL Ankle PF   Right +4/5 +4/5 +4/5 +4/5 +4/5 +4/5   Left +4/5 +4/5 +4/5 +4/5 +4/5 +4/5     IMAGING:    Lumbar MRI from 07/26/2021 was personally reviewed with pt and her  and demonstrated:  MRI Results (most recent):  Results from [place] encounter on 07/26/21    MRI LUMB SPINE WO CONT    Narrative  EXAM: MRI LUMB SPINE WO CONT    CLINICAL INDICATIONS/HISTORY: Renal cell cancer squamous cell cancer cyst  removed from the spine radicular complaints neurogenic claudication spinal  stenosis    COMPARISON: March 2016    Technique: Multi-sequence multiplanar T1, T2, STIR imaging with and without fat  saturation obtained centered on the lumbar spine. FINDINGS:    Alignment: Intact lordosis  Vertebral body height: Normal  Marrow signal: Bone marrow demonstrates no signal changes to suggest acute edema  and there is no signal changes to suggest metastatic disease posterior bony  elements side from arthritic changes show no destructive changes  Other: There are benign cysts in the kidneys on the right and left side given history  cannot kidney cancer however this does not compromise a complete diagnostic  study there is some distortion of the cortex of the right kidney on image 25  series 5 which may represent a previous partial nephrectomy  Disc spaces: Preserved height and signal intensity  Conus: Terminates at T12-L1    Axial imaging correlation:        L1-2: Patent canal and foramina.     L2-3: Mild midline annular bulge no central canal stenosis    Bulge projects into the L2-3 right neuroforamina exiting nerve root not  compressed    Central canal left neuroforamina unremarkable    L3-4: Mild annular bulge mild intradiscal degenerative changes Central canal  left and right neuroforamina are not stenotic exiting nerve roots are not  compressed there is moderate hypertrophic and arthritic changes in the facets    L4-5: Mild signal loss mild disc space narrowing there is anterior subluxation  of L4 on 5 measuring approximately 0.6 cm    The annulus demonstrates a mild protrusion-type bulge in the central canal  central canal is nonstenotic    Bulge projects in the left neuroforamina exiting nerve root is not compressed  bulge projects in right neuroforamina exiting nerve root is not compressed  facets demonstrate advanced arthritic and hypertrophic changes in degree of  subluxation has increased from 2016    L5-S1: Mild facet arthropathy slightly more accelerated on the right side there  is a mild signal loss in the disc space    Central canal neuroforamina unremarkable    Incidental note is made of Tarlov cysts bilaterally at the S1-S2    Impression  1. Progressive degenerative changes and subluxation of L4 on 5 secondary to  facet arthropathy marked facet arthropathy with small right-sided stenosis mild  facet arthropathy identified 5 S1  Moderate facet arthropathy L3-4    No evidence of any metastatic disease lumbar spinal column    Benign cysts in the kidneys for the right kidney probably secondary to old  surgery cannot however consider this a complete study of the kidneys to exclude  recurrent kidney cancer    Written by Dilshad Reis, as dictated by Massiel Mitchell MD.  I, Dr. Massiel Mitchell confirm that all documentation is accurate.

## 2021-10-25 ENCOUNTER — TELEPHONE (OUTPATIENT)
Dept: ORTHOPEDIC SURGERY | Age: 81
End: 2021-10-25

## 2021-10-25 NOTE — TELEPHONE ENCOUNTER
RECEIVED FAXED OKAY THAT PATIENT CAN STOP HER ASPIRIN 5 DAYS PRIOR TO SPINAL BLOCK SCHED FOR 11/3/2021.  PATIENT VERBALIZED UNDERSTANDING

## 2021-11-03 ENCOUNTER — APPOINTMENT (OUTPATIENT)
Dept: GENERAL RADIOLOGY | Age: 81
End: 2021-11-03
Attending: PHYSICAL MEDICINE & REHABILITATION
Payer: MEDICARE

## 2021-11-03 ENCOUNTER — HOSPITAL ENCOUNTER (OUTPATIENT)
Age: 81
Setting detail: OUTPATIENT SURGERY
Discharge: HOME | End: 2021-11-03
Attending: PHYSICAL MEDICINE & REHABILITATION | Admitting: PHYSICAL MEDICINE & REHABILITATION
Payer: MEDICARE

## 2021-11-03 VITALS
TEMPERATURE: 97.9 F | SYSTOLIC BLOOD PRESSURE: 173 MMHG | DIASTOLIC BLOOD PRESSURE: 77 MMHG | HEART RATE: 84 BPM | RESPIRATION RATE: 14 BRPM | OXYGEN SATURATION: 96 %

## 2021-11-03 DIAGNOSIS — M62.838 MUSCLE SPASM: ICD-10-CM

## 2021-11-03 DIAGNOSIS — M47.816 LUMBAR FACET ARTHROPATHY: ICD-10-CM

## 2021-11-03 PROCEDURE — 64493 INJ PARAVERT F JNT L/S 1 LEV: CPT | Performed by: PHYSICAL MEDICINE & REHABILITATION

## 2021-11-03 PROCEDURE — 2709999900 HC NON-CHARGEABLE SUPPLY: Performed by: PHYSICAL MEDICINE & REHABILITATION

## 2021-11-03 PROCEDURE — 77030003676 HC NDL SPN MPRI -A: Performed by: PHYSICAL MEDICINE & REHABILITATION

## 2021-11-03 PROCEDURE — 74011250637 HC RX REV CODE- 250/637: Performed by: PHYSICAL MEDICINE & REHABILITATION

## 2021-11-03 PROCEDURE — 74011250636 HC RX REV CODE- 250/636: Performed by: PHYSICAL MEDICINE & REHABILITATION

## 2021-11-03 PROCEDURE — 76010000009 HC PAIN MGT 0 TO 30 MIN PROC: Performed by: PHYSICAL MEDICINE & REHABILITATION

## 2021-11-03 PROCEDURE — 77030039433 HC TY MYLEOGRAM BD -B: Performed by: PHYSICAL MEDICINE & REHABILITATION

## 2021-11-03 PROCEDURE — 74011000250 HC RX REV CODE- 250: Performed by: PHYSICAL MEDICINE & REHABILITATION

## 2021-11-03 RX ORDER — LIDOCAINE HYDROCHLORIDE 10 MG/ML
INJECTION, SOLUTION EPIDURAL; INFILTRATION; INTRACAUDAL; PERINEURAL AS NEEDED
Status: DISCONTINUED | OUTPATIENT
Start: 2021-11-03 | End: 2021-11-03 | Stop reason: HOSPADM

## 2021-11-03 RX ORDER — DIAZEPAM 5 MG/1
5-20 TABLET ORAL ONCE
Status: COMPLETED | OUTPATIENT
Start: 2021-11-03 | End: 2021-11-03

## 2021-11-03 RX ORDER — DEXAMETHASONE SODIUM PHOSPHATE 100 MG/10ML
INJECTION INTRAMUSCULAR; INTRAVENOUS AS NEEDED
Status: DISCONTINUED | OUTPATIENT
Start: 2021-11-03 | End: 2021-11-03 | Stop reason: HOSPADM

## 2021-11-03 RX ADMIN — DIAZEPAM 10 MG: 5 TABLET ORAL at 15:02

## 2021-11-03 NOTE — PROCEDURES
Facet Joint Block Procedure Note    Patient Name: Lee Ann Night    Date of Procedure: November 3, 2021    Preoperative Diagnosis: Spondylosis of lumbar region without myelopathy or radiculopathy [M47.816]    Post Operative Diagnosis:Spondylosis of lumbar region without myelopathy or radiculopathy [M47.816]     Procedure:  bilateral  L4-L5 Facet Joint Block    Consent: Informed consent was obtained prior to the procedure. The patient was given the opportunity to ask questions regarding the procedure and its associated risks. In addition to the potential risks associated with the procedure itself, the patient was informed both verbally and in writing of potential side effects of the use of glucocorticoids. The patient appeared to comprehend the informed consent and desired to have the procedure performed. Procedure: The patient was placed in the prone position on the flouroscopy table and the back was prepped and draped in the usual sterile manner. At each blocked level, the exact location of the facet joint was identified with flouroscopy, and after local Lidocaine 1% injection, a #22 gauge spinal needle was then advanced toward the joint. A total of 10 mg of preservative free Dexamethasone and 5 cc of Lidocaine was injected around and equally divided among all of the sites. The patient tolerated the procedure well. The injection area was cleaned and bandaids applied. No excessive bleeding was noted. Patient dressed and was discharged to home with instructions.        Felipa Shepherd MD  November 3, 2021

## 2021-11-03 NOTE — DISCHARGE INSTRUCTIONS
[facility]   ([place])  Dr. Sara Navarro, Dr. Kyle Velasquez, Dr. Deboraha Schilder Spinal Procedure (Block) Instructions    * Do not drive a car, operate heavy machinery or dangerous equipment, or make important decisions for 12-24 hours. * Light activity as tolerated; may rest for the remainder of the day. * Resume pre-block medications including those from your other doctors. * Do not drink alcoholic beverages for 24 hours. Alcohol and the medications you have received may interact and cause an adverse reaction. * You may feel better this evening and worse tomorrow, as the numbing medications wears off and the steroid has yet to begin to work. After 48-72 hrs the steroid should begin to release bringing you relief. If you had a medial branch block, no steroids were used. The medial branch block is a test to see if you are a candidate for radiofrequency ablation (RFA). The anesthetic (numbing medicine)  will wear off by the next day. * You may shower this evening and remove any bandages. * Avoid hot tubs/pools/tub soaks and heating pads for 24 hours. You may use cold packs on the procedure site as tolerated for the first 24 hours. * If a headache develops, drink plenty of fluids and rest.  Take over the counter medications for headache if needed. If the headache continues longer than 24 hours, call MD at the [address] [place] [address] Avenue. [phone]    * Continue taking pain medications as needed. * You may resume your regular diet if tolerated. Otherwise, start with sips of water and advance slowly. * If Diabetic: check your blood sugar three times a day for the next 3 days. If your sugar is greater than 300 call your family doctor. If your sugar is greater than 400, have someone transport you to the nearest Emergency Room. * If you experience any of the following problems, Please Call the [address] [place] [address] Avenue at [phone].         * Excessive pain, swelling, redness or odor at or around the surgical area    * Fever of 101 or higher    * Nausea / Vomiting lasting longer than 4 hours or if unable to take medications. * Severe Headache    * Weakness or numbness in arms or legs that is not      resolving   * Any NEW signs of decreased circulation or nerve impairment in leg: change in color, swelling, persistent numbness, tingling                    * Prolonged increase in pain greater than 4 days      PATIENT INSTRUCTIONS:    After oral sedation, for 12-24 hours or while taking prescription Narcotics:  · Limit your activities  · Do not drive and operate hazardous machinery  · Do not make important personal or business decisions  · Do  not drink alcoholic beverages  · If you have not urinated within 8 hours after discharge, please contact your surgeon on call. *  Please give a list of your current medications to your Primary Care Provider. *  Please update this list whenever your medications are discontinued, doses are      changed, or new medications (including over-the-counter products) are added. *  Please carry medication information at all times in case of emergency situations. These are general instructions for a healthy lifestyle:    No smoking/ No tobacco products/ Avoid exposure to second hand smoke    Surgeon General's Warning:  Quitting smoking now greatly reduces serious risk to your health. Obesity, smoking, and sedentary lifestyle greatly increases your risk for illness    A healthy diet, regular physical exercise & weight monitoring are important for maintaining a healthy lifestyle    You may be retaining fluid if you have a history of heart failure or if you experience any of the following symptoms:  Weight gain of 3 pounds or more overnight or 5 pounds in a week, increased swelling in our hands or feet or shortness of breath while lying flat in bed.   Please call your doctor as soon as you notice any of these symptoms; do not wait until your next office visit. Recognize signs and symptoms of STROKE:    F-face looks uneven    A-arms unable to move or move unevenly    S-speech slurred or non-existent    T-time-call 911 as soon as signs and symptoms begin-DO NOT go       Back to bed or wait to see if you get better-TIME IS BRAIN.

## 2021-11-03 NOTE — PERIOP NOTES
Patient tolerated procedure well. No complications noted. VSS. No redness, swelling, or bleeding from injection site. Dressing dry and intact. Armband removed and shredded. Patient wheeled to main entrance by RN and discharged alive and well, in stable condition.

## 2021-11-03 NOTE — H&P
Date of Surgery Update: Treasure Chatterjee was seen and examined. History and physical has been reviewed. The patient has been examined. There have been no significant clinical changes since the LAST OFFICE VISIT. The patient was counseled at length about the risks of nuzhat Covid-19 during their perioperative period and any recovery window from their procedure. The patient was made aware that nuzhat Covid-19  may worsen their prognosis for recovering from their procedure and lend to a higher morbidity and/or mortality risk. All material risks, benefits, and reasonable alternatives including postponing the procedure were discussed. The patient does  wish to proceed with the procedure at this time.     Pre injection pain level:  6/10  Post injection pain level:   8/10    Signed By: Mateus Samson MD     November 3, 2021 2:20 PM

## 2022-01-31 DIAGNOSIS — Z79.891 USE OF OPIATES FOR THERAPEUTIC PURPOSES: ICD-10-CM

## 2022-01-31 DIAGNOSIS — G89.29 OTHER CHRONIC PAIN: ICD-10-CM

## 2022-01-31 NOTE — TELEPHONE ENCOUNTER
Last Visit: block done 11/3/21 with MD Singh Delaney  Next Appointment: 12/16/21 pt cancelled appt  Previous Refill Encounter(s): 10/21/21 #90 with 2 refills    Requested Prescriptions     Pending Prescriptions Disp Refills    acetaminophen-codeine (Tylenol-Codeine #4) 300-60 mg tab 90 Tablet 2     Sig: Take 1 Tablet by mouth every eight (8) hours as needed for Pain for up to 30 days. Max Daily Amount: 3 Tablets.

## 2022-02-01 RX ORDER — ACETAMINOPHEN AND CODEINE PHOSPHATE 300; 60 MG/1; MG/1
1 TABLET ORAL
Qty: 90 TABLET | Refills: 0 | Status: SHIPPED | OUTPATIENT
Start: 2022-02-01 | End: 2022-02-02 | Stop reason: DRUGHIGH

## 2022-02-02 ENCOUNTER — VIRTUAL VISIT (OUTPATIENT)
Dept: ORTHOPEDIC SURGERY | Age: 82
End: 2022-02-02
Payer: MEDICARE

## 2022-02-02 DIAGNOSIS — Z79.891 USE OF OPIATES FOR THERAPEUTIC PURPOSES: ICD-10-CM

## 2022-02-02 DIAGNOSIS — Z90.5 S/P NEPHRECTOMY: ICD-10-CM

## 2022-02-02 DIAGNOSIS — N18.30 STAGE 3 CHRONIC KIDNEY DISEASE, UNSPECIFIED WHETHER STAGE 3A OR 3B CKD (HCC): ICD-10-CM

## 2022-02-02 DIAGNOSIS — G89.29 CHRONIC RIGHT SHOULDER PAIN: ICD-10-CM

## 2022-02-02 DIAGNOSIS — M47.816 LUMBAR FACET ARTHROPATHY: ICD-10-CM

## 2022-02-02 DIAGNOSIS — M25.511 CHRONIC RIGHT SHOULDER PAIN: ICD-10-CM

## 2022-02-02 DIAGNOSIS — G89.29 OTHER CHRONIC PAIN: Primary | ICD-10-CM

## 2022-02-02 DIAGNOSIS — M62.838 MUSCLE SPASM: ICD-10-CM

## 2022-02-02 PROCEDURE — 99442 PR PHYS/QHP TELEPHONE EVALUATION 11-20 MIN: CPT | Performed by: PHYSICAL MEDICINE & REHABILITATION

## 2022-02-02 RX ORDER — ACETAMINOPHEN AND CODEINE PHOSPHATE 300; 30 MG/1; MG/1
1 TABLET ORAL
Qty: 90 TABLET | Refills: 2 | Status: SHIPPED | OUTPATIENT
Start: 2022-02-02 | End: 2022-07-26 | Stop reason: SDUPTHER

## 2022-02-02 NOTE — PROGRESS NOTES
[facility]  [address] [place] [address], [facility] [address]  Phone: [phone]  Fax: [phone]    TELEPHONE VISIT    Pt's YOB: 1940    ASSESSMENT   Diagnoses and all orders for this visit:    1. Other chronic pain  -     acetaminophen-codeine (Tylenol-Codeine #3) 300-30 mg per tablet; Take 1 Tablet by mouth every eight (8) hours as needed for Pain for up to 30 days. Max Daily Amount: 3 Tablets. 2. Use of opiates for therapeutic purposes  -     acetaminophen-codeine (Tylenol-Codeine #3) 300-30 mg per tablet; Take 1 Tablet by mouth every eight (8) hours as needed for Pain for up to 30 days. Max Daily Amount: 3 Tablets. 3. Lumbar facet arthropathy  -     acetaminophen-codeine (Tylenol-Codeine #3) 300-30 mg per tablet; Take 1 Tablet by mouth every eight (8) hours as needed for Pain for up to 30 days. Max Daily Amount: 3 Tablets. 4. Muscle spasm    5. Stage 3 chronic kidney disease, unspecified whether stage 3a or 3b CKD ([facility] [address]    6. Chronic right shoulder pain    7. S/p right nephrectomy for cancer in 1/10         IMPRESSION AND PLAN:  Sergio Chavez is a 80 y.o. female with history of chronic lumbar pain. Pt complains of continued lumbar pain with activity, occasional stumbling, slight lightheadedness with standing quickly, and a few falls. She is currently taking Tylenol #4 1 tab daily but preferred Tylenol #3. 1) Pt was given information on lumbar arthritis exercises. 2) Pt will discontinue Tylenol #4 and resume taking Tylenol #3 1 tab every 8 hours as needed. 3) Pt is not a candidate for NSAID's due to chronic renal disease. 4) Ms. Benjamin Moreno has a reminder for a \"due or due soon\" health maintenance. I have asked that she contact her primary care provider, Amie Mercer DO, for follow-up on this health maintenance. 5)  demonstrated consistency with prescribing. 6) Last UDS from 10/21/2021 was consistent.     Follow-up and Dispositions    · Return in about 3 months (around 5/2/2022) for Medication follow up. CPT Codes 43589-78657 for Established Patients may apply to this TeleHealth Visit. Time-based coding, delete if not needed: I spent 13 minutes and 46 seconds from 8:28a to 8:42a with this established patient, and >50% of the time was spent counseling and/or coordinating care regarding her symptoms and medications. Due to this being a TeleHealth evaluation, many elements of the physical examination are unable to be assessed. Pursuant to the emergency declaration under the [address] [place] [address], [facility] waiver authority and the [employer] Act, this telephone visit was conducted, with patient's consent, to reduce the patient's risk of exposure to COVID-19 and provide continuity of care for an established patient. Services were provided through a telephone discussion to substitute for in-person clinic visit. HISTORY OF PRESENT ILLNESS:  Hiral Alejandre is a 80 y.o. female with history of chronic lumbar pain and was evaluated from her residence by telephone at the [facility] location on 2/2/2022 with her verbal consent for medication follow up, accompanied by her . Pt complains of continued lumbar pain with activity, occasional stumbling, slight lightheadedness with standing quickly, and a few falls. She notes that her pain is exacerbated by the colder and damp weather. Pt reports that she has a cane at home and is in the process of evaluation for a [employer] motorized wheelchair. She also notes that she stays active caring for her 2 dogs and states that her memory has remained about the same since her last office visit. She notes rare shortness of breath and lightheadedness. Pt is taking Tylenol #4 1 tab BID as needed but states that she preferred Tylenol #3. She denies any constipation, dizziness, or lightheadedness with Tylenol #3. Pt's  states that pt has occasional dizziness and nausea when taking Tylenol #4. Pt denies taking any anticoagulants. She notes relief and denies any dizziness or giddiness with previous lumbar steroid injections. She also notes allergies exacerbated with the colder weather. Pt at this time desires to continue with current care. Pain Scale: /10    PCP: Balbina Rea DO     Past Medical History:   Diagnosis Date    Anemia NEC     Aneurysm ([address]     left kidney    Arthritis     Asthma     Cardiac catheterization 02/23/2015    R dominant. LM patent. mLAD 20%. CX patent. mRCA 20%. LVEDP 8.  EF 60%. Mild AS. RA 3/1. PA 24. W 6/5.  CI 2.94.    Cardiac echocardiogram 07/27/2016    EF 60-65%. No WMA. Mild LVH. Normal LV diastolic fx. Mild LAE. Mod-severe AS (mean grad 34 mmHg). AS has worsened since study of 1/20/15. Mild MR.  Cardiac nuclear imaging test 02/10/2015    No evidence of ischemia or prior infarction. Hyperdynamic LV fx.  EF >70%. No RWMA. Nondiagnostic EKG on pharm stress test.  Low risk.  Carotid duplex 01/12/2016    Mild < 50% bilateral ICA stenosis.       Chest tightness     exertional, in the setting of widely patent coronary arteries, possibly related to elevated left ventricular end-diastolic pressure or possibly to small-vessel disease, improved on Cardizem CD in place of Verapamil    Chronic kidney disease, stage III (moderate) (HCC)     Essential hypertension     Glaucoma     Heart murmur     Hypercholesterolemia     Hypoglycemia     Raynaud's syndrome     Renal cell carcinoma (HCC)     Stage T1a [place]hman Grade 2 s/p right open partial nephrectomy 2/22/10      S/p nephrectomy 2/22/10    right    Scoliosis     Stroke [facility]) 2/2015    resid los of balance        Social History     Socioeconomic History    Marital status:      Spouse name: Not on file    Number of children: Not on file    Years of education: Not on file    Highest education level: Not on file   Occupational History    Not on file   Tobacco Use    Smoking status: Never Smoker    Smokeless tobacco: Never Used   Substance and Sexual Activity    Alcohol use: No     Alcohol/week: 0.0 standard drinks    Drug use: No    Sexual activity: Yes     Partners: Male   Other Topics Concern    Not on file   Social History Narrative    Not on file     Social Determinants of Health     Financial Resource Strain:     Difficulty of Paying Living Expenses: Not on file   Food Insecurity:     Worried About Running Out of Food in the Last Year: Not on file    Reji of Food in the Last Year: Not on file   Transportation Needs:     Lack of Transportation (Medical): Not on file    Lack of Transportation (Non-Medical): Not on file   Physical Activity:     Days of Exercise per Week: Not on file    Minutes of Exercise per Session: Not on file   Stress:     Feeling of Stress : Not on file   Social Connections:     Frequency of Communication with Friends and Family: Not on file    Frequency of Social Gatherings with Friends and Family: Not on file    Attends [employer] Services: Not on file    Active Member of [address] [place] [address] or Organizations: Not on file    Attends Club or Organization Meetings: Not on file    Marital Status: Not on file   Intimate Partner Violence:     Fear of Current or Ex-Partner: Not on file    Emotionally Abused: Not on file    Physically Abused: Not on file    Sexually Abused: Not on file   Housing Stability:     Unable to Pay for Housing in the Last Year: Not on file    Number of [place] in the Last Year: Not on file    Unstable Housing in the Last Year: Not on file       Current Outpatient Medications   Medication Sig Dispense Refill    acetaminophen-codeine (Tylenol-Codeine #3) 300-30 mg per tablet Take 1 Tablet by mouth every eight (8) hours as needed for Pain for up to 30 days. Max Daily Amount: 3 Tablets.  90 Tablet 2    potassium chloride SR (K-TAB) 20 mEq tablet Take 1 Tablet by mouth two (2) times a day.  polyethylene glycol (MIRALAX) 17 gram/dose powder Take 17 g by mouth daily.  mirtazapine (REMERON) 15 mg tablet Take 1 Tablet by mouth daily.  ipratropium (ATROVENT) 21 mcg (0.03 %) nasal spray 1 [place] by Both Nostrils route daily as needed.  donepeziL (ARICEPT) 5 mg tablet Take 1 Tablet by mouth daily.  cyanocobalamin (VITAMIN B12) 100 mcg tablet Take 100 mcg by mouth daily.  losartan (COZAAR) 100 mg tablet Take 1 Tab by mouth daily. 90 Tab 3    furosemide (LASIX) 20 mg tablet Take 20 mg by mouth daily.  amLODIPine (NORVASC) 5 mg tablet Take 5 mg by mouth daily.  LINZESS 145 mcg cap capsule Take 145 mcg by mouth daily.  aspirin delayed-release 81 mg tablet Take 81 mg by mouth daily.  atorvastatin (LIPITOR) 80 mg tablet Take 1 Tab by mouth daily. 90 Tab 3    Cholecalciferol, Vitamin D3, 50,000 unit cap Take 1 Cap by mouth every seven (7) days.  PROPYLENE GLYCOL/ (SYSTANE OP) Apply  to eye as needed.  albuterol (PROVENTIL HFA, VENTOLIN HFA) 90 mcg/actuation inhaler Take 2 Puffs by inhalation as needed.  cetirizine (ZYRTEC) 10 mg tablet Take 10 mg by mouth as needed.  methylPREDNISolone (MEDROL DOSEPACK) 4 mg tablet Per dose pack instructions (Patient not taking: Reported on 2/2/2022) 1 Dose Pack 0       Allergies   Allergen Reactions    Vicodin [Hydrocodone-Acetaminophen] Nausea and Vomiting         REVIEW OF SYSTEMS    Constitutional: Negative for fever, chills, or weight change. Respiratory: Negative for cough. Positive for shortness of breath. Cardiovascular: Negative for chest pain or palpitations. Gastrointestinal: Negative for acid reflux, change in bowel habits, or constipation. Genitourinary: Negative for dysuria and flank pain. Musculoskeletal: Positive for lumbar pain. Skin: Negative for rash. Neurological: Negative for headaches or numbness.  Positive for dizziness. Endo/Heme/Allergies: Negative for increased bruising. Psychiatric/Behavioral: Negative for difficulty with sleep. IMAGING:      Lumbar MRI from 07/26/2021 was personally reviewed with pt and her  and demonstrated:  MRI Results (most recent):  [facility]     MRI LUMB SPINE WO CONT     Narrative  EXAM: MRI LUMB SPINE WO CONT     CLINICAL INDICATIONS/HISTORY: Renal cell cancer squamous cell cancer cyst  removed from the spine radicular complaints neurogenic claudication spinal  stenosis     COMPARISON: March 2016     Technique: Multi-sequence multiplanar T1, T2, STIR imaging with and without fat  saturation obtained centered on the lumbar spine.     FINDINGS:     Alignment: Intact lordosis  Vertebral body height: Normal  Marrow signal: Bone marrow demonstrates no signal changes to suggest acute edema  and there is no signal changes to suggest metastatic disease posterior bony  elements side from arthritic changes show no destructive changes  Other:   There are benign cysts in the kidneys on the right and left side given history  cannot kidney cancer however this does not compromise a complete diagnostic  study there is some distortion of the cortex of the right kidney on image 25  series 5 which may represent a previous partial nephrectomy  Disc spaces: Preserved height and signal intensity  Conus: Terminates at T12-L1     Axial imaging correlation:           L1-2: Patent canal and foramina.     L2-3: Mild midline annular bulge no central canal stenosis     Bulge projects into the L2-3 right neuroforamina exiting nerve root not  compressed     Central canal left neuroforamina unremarkable     L3-4: Mild annular bulge mild intradiscal degenerative changes Central canal  left and right neuroforamina are not stenotic exiting nerve roots are not  compressed there is moderate hypertrophic and arthritic changes in the facets     L4-5: Mild signal loss mild disc space narrowing there is anterior subluxation  of L4 on 5 measuring approximately 0.6 cm     The annulus demonstrates a mild protrusion-type bulge in the central canal  central canal is nonstenotic     Bulge projects in the left neuroforamina exiting nerve root is not compressed  bulge projects in right neuroforamina exiting nerve root is not compressed  facets demonstrate advanced arthritic and hypertrophic changes in degree of  subluxation has increased from 2016     L5-S1: Mild facet arthropathy slightly more accelerated on the right side there  is a mild signal loss in the disc space     Central canal neuroforamina unremarkable     Incidental note is made of Tarlov cysts bilaterally at the S1-S2     Impression  1. Progressive degenerative changes and subluxation of L4 on 5 secondary to  facet arthropathy marked facet arthropathy with small right-sided stenosis mild  facet arthropathy identified 5 S1  Moderate facet arthropathy L3-4     No evidence of any metastatic disease lumbar spinal column     Benign cysts in the kidneys for the right kidney probably secondary to old  surgery cannot however consider this a complete study of the kidneys to exclude  recurrent kidney cancer    Written by Gonsalo Mallory, as dictated by Alfie Mosley MD.  I, Dr. Alfie Mosley confirm that all documentation is accurate.

## 2022-02-07 DIAGNOSIS — Z79.891 USE OF OPIATES FOR THERAPEUTIC PURPOSES: ICD-10-CM

## 2022-02-07 DIAGNOSIS — G89.29 OTHER CHRONIC PAIN: ICD-10-CM

## 2022-02-07 RX ORDER — ACETAMINOPHEN AND CODEINE PHOSPHATE 300; 60 MG/1; MG/1
TABLET ORAL
Qty: 90 TABLET | Refills: 2 | OUTPATIENT
Start: 2022-02-07

## 2022-02-07 NOTE — TELEPHONE ENCOUNTER
I changed this back to Tylenol # 3 -- Tylenol # 4 gives her nausea - please verify this was received by the pharmacy

## 2022-02-08 NOTE — TELEPHONE ENCOUNTER
Called and spoke to pharmacy they stated that they did NOT  receive the prescription for tylenol number 3 that was sent 2/2/22

## 2022-02-12 NOTE — PROGRESS NOTES
Pt is now taking Tylenol # 3 which she was on previously and no longer uses Tylenol #4 due to nausea with the medication

## 2022-02-15 NOTE — TELEPHONE ENCOUNTER
Patient spouse Luis Angel Plata called and said that [address] is still stating that they have not received the Tylenol- Codeine # 3 from [address] [place] [address]. Mr. Dagoberto Vigil is asking if we could re send it to the pharmacy and then let him know when it was sent, because he is tired of going to the pharmacy and [facility] telling him that they have not received it. [facility] on [address] in [place] [phone]. Mr. Jayden Loco. [phone].

## 2022-02-15 NOTE — TELEPHONE ENCOUNTER
Pharmacy contacted and told that we sent over an RX for tylenol 3 and they looked it up and they did not have it. The pharmacist took the prescription verbally . Tried calling patient twice, no answer and VM not set up.

## 2022-03-18 PROBLEM — F09 MILD COGNITIVE DISORDER: Status: ACTIVE | Noted: 2021-02-24

## 2022-03-18 PROBLEM — M25.559 GREATER TROCHANTERIC PAIN SYNDROME: Status: ACTIVE | Noted: 2021-06-10

## 2022-03-18 PROBLEM — I47.1 SUPRAVENTRICULAR TACHYCARDIA (HCC): Status: ACTIVE | Noted: 2017-05-23

## 2022-03-18 PROBLEM — I47.10 SUPRAVENTRICULAR TACHYCARDIA: Status: ACTIVE | Noted: 2017-05-23

## 2022-03-18 PROBLEM — C44.621 SQUAMOUS CELL CARCINOMA OF UPPER EXTREMITY: Status: ACTIVE | Noted: 2020-02-07

## 2022-03-18 PROBLEM — R55 SYNCOPE: Status: ACTIVE | Noted: 2021-06-10

## 2022-03-18 PROBLEM — M54.50 LOW BACK PAIN: Status: ACTIVE | Noted: 2021-06-10

## 2022-03-18 PROBLEM — M81.0 OSTEOPOROSIS: Status: ACTIVE | Noted: 2021-06-10

## 2022-03-18 PROBLEM — R63.4 UNEXPLAINED WEIGHT LOSS: Status: ACTIVE | Noted: 2021-06-10

## 2022-03-18 PROBLEM — R63.4 UNINTENTIONAL WEIGHT LOSS: Status: ACTIVE | Noted: 2021-06-10

## 2022-03-19 PROBLEM — R06.00 DYSPNEA: Status: ACTIVE | Noted: 2021-06-10

## 2022-03-19 PROBLEM — E55.9 VITAMIN D DEFICIENCY: Status: ACTIVE | Noted: 2021-06-10

## 2022-03-19 PROBLEM — Z79.01 CHRONIC ANTICOAGULATION: Status: ACTIVE | Noted: 2018-07-08

## 2022-03-19 PROBLEM — R73.9 HYPERGLYCEMIA: Status: ACTIVE | Noted: 2021-04-26

## 2022-03-19 PROBLEM — Z95.2 HISTORY OF ARTIFICIAL HEART VALVE: Status: ACTIVE | Noted: 2020-10-21

## 2022-03-19 PROBLEM — R76.8 ANTINUCLEAR FACTOR POSITIVE: Status: ACTIVE | Noted: 2021-06-10

## 2022-03-19 PROBLEM — M70.60 TROCHANTERIC BURSITIS: Status: ACTIVE | Noted: 2021-06-10

## 2022-03-19 PROBLEM — R73.09 ABNORMAL GLUCOSE LEVEL: Status: ACTIVE | Noted: 2021-06-10

## 2022-03-19 PROBLEM — G45.9 TRANSIENT ISCHEMIC ATTACK: Status: ACTIVE | Noted: 2021-06-10

## 2022-03-19 PROBLEM — N18.30 STAGE 3 CHRONIC KIDNEY DISEASE (HCC): Status: ACTIVE | Noted: 2021-02-21

## 2022-03-19 PROBLEM — R00.2 PALPITATIONS: Status: ACTIVE | Noted: 2021-06-10

## 2022-03-19 PROBLEM — H40.9 GLAUCOMA: Status: ACTIVE | Noted: 2021-06-10

## 2022-03-19 PROBLEM — M18.9 OSTEOARTHRITIS OF CARPOMETACARPAL (CMC) JOINT OF THUMB: Status: ACTIVE | Noted: 2019-05-08

## 2022-03-19 PROBLEM — K21.9 GASTROESOPHAGEAL REFLUX DISEASE: Status: ACTIVE | Noted: 2021-06-10

## 2022-03-19 PROBLEM — M62.830 MUSCLE SPASM OF BACK: Status: ACTIVE | Noted: 2017-04-17

## 2022-03-19 PROBLEM — I35.9 AORTIC VALVE DISORDER: Status: ACTIVE | Noted: 2018-03-20

## 2022-03-19 PROBLEM — I73.00 RAYNAUD'S DISEASE: Status: ACTIVE | Noted: 2021-06-10

## 2022-03-19 PROBLEM — Z86.73 HISTORY OF CEREBROVASCULAR ACCIDENT: Status: ACTIVE | Noted: 2021-02-24

## 2022-03-19 PROBLEM — M12.9 ARTHROPATHY: Status: ACTIVE | Noted: 2021-06-10

## 2022-03-19 PROBLEM — Z51.81 THERAPEUTIC DRUG MONITORING: Status: ACTIVE | Noted: 2017-04-17

## 2022-03-19 PROBLEM — F33.0 MILD RECURRENT MAJOR DEPRESSION (HCC): Status: ACTIVE | Noted: 2021-02-24

## 2022-03-20 PROBLEM — M25.559 HIP PAIN: Status: ACTIVE | Noted: 2021-06-10

## 2022-03-20 PROBLEM — J30.9 ALLERGIC RHINITIS: Status: ACTIVE | Noted: 2021-06-10

## 2022-03-23 ENCOUNTER — OFFICE VISIT (OUTPATIENT)
Dept: CARDIOLOGY CLINIC | Age: 82
End: 2022-03-23
Payer: MEDICARE

## 2022-03-23 VITALS
BODY MASS INDEX: 20.8 KG/M2 | HEART RATE: 74 BPM | HEIGHT: 62 IN | DIASTOLIC BLOOD PRESSURE: 72 MMHG | OXYGEN SATURATION: 98 % | SYSTOLIC BLOOD PRESSURE: 122 MMHG | WEIGHT: 113 LBS

## 2022-03-23 DIAGNOSIS — I35.0 AORTIC VALVE STENOSIS, ETIOLOGY OF CARDIAC VALVE DISEASE UNSPECIFIED: Primary | ICD-10-CM

## 2022-03-23 DIAGNOSIS — Z95.2 HISTORY OF TRANSCATHETER AORTIC VALVE REPLACEMENT (TAVR): ICD-10-CM

## 2022-03-23 PROCEDURE — 1101F PT FALLS ASSESS-DOCD LE1/YR: CPT | Performed by: INTERNAL MEDICINE

## 2022-03-23 PROCEDURE — 1090F PRES/ABSN URINE INCON ASSESS: CPT | Performed by: INTERNAL MEDICINE

## 2022-03-23 PROCEDURE — G8536 NO DOC ELDER MAL SCRN: HCPCS | Performed by: INTERNAL MEDICINE

## 2022-03-23 PROCEDURE — 99214 OFFICE O/P EST MOD 30 MIN: CPT | Performed by: INTERNAL MEDICINE

## 2022-03-23 PROCEDURE — G8427 DOCREV CUR MEDS BY ELIG CLIN: HCPCS | Performed by: INTERNAL MEDICINE

## 2022-03-23 PROCEDURE — 93000 ELECTROCARDIOGRAM COMPLETE: CPT | Performed by: INTERNAL MEDICINE

## 2022-03-23 PROCEDURE — G8752 SYS BP LESS 140: HCPCS | Performed by: INTERNAL MEDICINE

## 2022-03-23 PROCEDURE — G8754 DIAS BP LESS 90: HCPCS | Performed by: INTERNAL MEDICINE

## 2022-03-23 PROCEDURE — G8420 CALC BMI NORM PARAMETERS: HCPCS | Performed by: INTERNAL MEDICINE

## 2022-03-23 PROCEDURE — G9717 DOC PT DX DEP/BP F/U NT REQ: HCPCS | Performed by: INTERNAL MEDICINE

## 2022-03-23 NOTE — PATIENT INSTRUCTIONS
If you have not heard from the central scheduler to schedule your testing in 48 hours, please call [phone].

## 2022-03-23 NOTE — PROGRESS NOTES
Natali Crook presents today for   Chief Complaint   Patient presents with    Follow-up     overdue follow up     Palpitations     occasionally      Dizziness     more off balance        Natali Crook preferred language for health care discussion is english/other. Is someone accompanying this pt? Yes,      Is the patient using any DME equipment during [address]? no    Depression Screening:  3 most recent PHQ Screens 3/23/2022   PHQ Not Done -   Little interest or pleasure in doing things Not at all   Feeling down, depressed, irritable, or hopeless Not at all   Total Score PHQ 2 0       Learning Assessment:  Learning Assessment 7/26/2016   PRIMARY LEARNER Patient   HIGHEST LEVEL OF EDUCATION - PRIMARY LEARNER  -   BARRIERS PRIMARY LEARNER -   CO-LEARNER CAREGIVER -   PRIMARY LANGUAGE ENGLISH   LEARNER PREFERENCE PRIMARY DEMONSTRATION     -   ANSWERED BY Patient   RELATIONSHIP SELF       Abuse Screening:  Abuse Screening Questionnaire 3/23/2022   Do you ever feel afraid of your partner? N   Are you in a relationship with someone who physically or mentally threatens you? N   Is it safe for you to go home? Y       Fall Risk  Fall Risk Assessment, last 12 mths 3/23/2022   Able to walk? Yes   Fall in past 12 months? 0   Do you feel unsteady? 0   Are you worried about falling 0   Number of falls in past 12 months -   Fall with injury? -       Pt currently taking Anticoagulant therapy? no    Coordination of Care:  1. Have you been to the ER, urgent care clinic since your last visit? Hospitalized since your last visit? no    2. Have you seen or consulted any other health care providers outside of the [address] [place] [address] since your last visit? Include any pap smears or colon screening.  no

## 2022-03-23 NOTE — PROGRESS NOTES
HISTORY OF PRESENT ILLNESS  Leonarda Toussaint is a [de-identified] y.o. female. ASSESSMENT and PLAN    Ms. Lemond Denver has history of aortic stenosis. She underwent TAVR procedure in 2018 by Dr. Frutoso Gowers. Her preprocedure coronary angiography revealed widely patent coronary arteries. Subsequently, she had TAVR utilizing 26 mm Medtronic Evolute R stent graft. Her echocardiogram in July 2020 revealed normal LV function with well-functioning aortic stent graft. From cardiac standpoint, she is doing quite well. She denies any complaints of chest pains or increased dyspnea on exertion. Her blood pressure is well controlled at 122/72. Her rhythm remains stable sinus at 72 bpm.  There is no evidence of decompensated CHF noted. Her lung fields are clear. Her weight today is 113 pounds. She states that this is near her baseline. Her target LDL is less than 70. She continues on Lipitor 80 mg daily. She continues on baby aspirin daily. I would repeat an echocardiogram with her known history of TAVR and last echocardiogram in July 2020. If echocardiogram is without change, I will see her back annually. Encounter Diagnoses   Name Primary?  Aortic valve stenosis, etiology of cardiac valve disease unspecified Yes    History of transcatheter aortic valve replacement (TAVR)      current treatment plan is effective, no change in therapy  lab results and schedule of future lab studies reviewed with patient  reviewed diet, exercise and weight control  cardiovascular risk and specific lipid/LDL goals reviewed  use of aspirin to prevent MI and TIA's discussed      HPI   Today, Ms. Stuart Guzman comes in as a new patient. She has history of aortic stenosis status post TAVR procedure back in 2018. She was lost to follow-up since July 2020. Today, she denies any complaints of chest pains, increased shortness of breath or decreased exercise capacity. She feels that her balance is gradually worsening. She denies kelly syncope.   She does have occasional episodes of palpitations. She denies orthopnea or PND. Review of Systems   Respiratory: Negative for shortness of breath. Cardiovascular: Negative for chest pain, palpitations, orthopnea, claudication, leg swelling and PND. All other systems reviewed and are negative. Physical Exam  Vitals and nursing note reviewed. Constitutional:       Appearance: Normal appearance. HENT:      Head: Normocephalic. Eyes:      Conjunctiva/sclera: Conjunctivae normal.   Neck:      Vascular: No carotid bruit. Cardiovascular:      Rate and Rhythm: Normal rate and regular rhythm. Heart sounds: Murmur heard. Crescendo decrescendo systolic murmur is present with a grade of 2/6. Pulmonary:      Breath sounds: Normal breath sounds. Abdominal:      Palpations: Abdomen is soft. Musculoskeletal:         General: No swelling. Cervical back: No rigidity. Skin:     General: Skin is warm and dry. Neurological:      General: No focal deficit present. Mental Status: She is alert and oriented to person, place, and time. Psychiatric:         Mood and Affect: Mood normal.         Behavior: Behavior normal.         PCP: Terrie Arriaga DO    Past Medical History:   Diagnosis Date    Anemia NEC     Aneurysm ([address]     left kidney    Arthritis     Asthma     Cardiac catheterization 02/23/2015    R dominant. LM patent. mLAD 20%. CX patent. mRCA 20%. LVEDP 8.  EF 60%. Mild AS. RA 3/1. PA 24. W 6/5.  CI 2.94.    Cardiac echocardiogram 07/27/2016    EF 60-65%. No WMA. Mild LVH. Normal LV diastolic fx. Mild LAE. Mod-severe AS (mean grad 34 mmHg). AS has worsened since study of 1/20/15. Mild MR.  Cardiac nuclear imaging test 02/10/2015    No evidence of ischemia or prior infarction. Hyperdynamic LV fx.  EF >70%. No RWMA. Nondiagnostic EKG on pharm stress test.  Low risk.  Carotid duplex 01/12/2016    Mild < 50% bilateral ICA stenosis.       Chest tightness exertional, in the setting of widely patent coronary arteries, possibly related to elevated left ventricular end-diastolic pressure or possibly to small-vessel disease, improved on Cardizem CD in place of Verapamil    Chronic kidney disease, stage III (moderate) (HCC)     Essential hypertension     Glaucoma     Heart murmur     Hypercholesterolemia     Hypoglycemia     Raynaud's syndrome     Renal cell carcinoma (HCC)     Stage T1a Furhman Grade 2 s/p right open partial nephrectomy 2/22/10      S/p nephrectomy 2/22/10    right    Scoliosis     Stroke [facility]) 2/2015    resid los of balance       Past Surgical History:   Procedure Laterality Date    COLONOSCOPY N/A 10/10/2016    [facility] ENDOSCOPY    HX AORTIC VALVE REPLACEMENT Bilateral 03/2012018    not allowed to have MRI    HX AORTIC VALVE REPLACEMENT      HX CYST REMOVAL      on spine    HX GYN      partial hysterectomy 1988, on Premarin until a few years ago    HX HEART CATHETERIZATION  3/18/09    Low left-sided and right-sided filling pressures (likely related to overnight fasting). Normal resting pulmonary  arterial pressure. Normal systemic pressures. Borderline left ventricular end-diastolic pressure at rest. Mild incerease in pulmonary artery pressure and pulmonary capillary wedge pressure with exercise. Mild, hemodynamically nonsignificant epicardial coronary artery disease.     HX NEPHRECTOMY  2/22/10    s/p partial right nephrectomy for right kidney lower pole mass secondary to early stage cancer    HX OTHER SURGICAL      Ovary removal    HX TRABECULECTOMY      VASCULAR SURGERY PROCEDURE UNLIST  03/20/2018    artificial Heart Valve (Dr. Fabricio Padilla with [facility])       Current Outpatient Medications   Medication Sig Dispense Refill    methylPREDNISolone (MEDROL DOSEPACK) 4 mg tablet Per dose pack instructions 1 Dose Pack 0    potassium chloride SR (K-TAB) 20 mEq tablet Take 1 Tablet by mouth two (2) times a day.  polyethylene glycol (MIRALAX) 17 gram/dose powder Take 17 g by mouth daily.  mirtazapine (REMERON) 15 mg tablet Take 1 Tablet by mouth daily.  ipratropium (ATROVENT) 21 mcg (0.03 %) nasal spray 1 [place] by Both Nostrils route daily as needed.  donepeziL (ARICEPT) 5 mg tablet Take 1 Tablet by mouth daily.  cyanocobalamin (VITAMIN B12) 100 mcg tablet Take 100 mcg by mouth daily.  losartan (COZAAR) 100 mg tablet Take 1 Tab by mouth daily. 90 Tab 3    furosemide (LASIX) 20 mg tablet Take 20 mg by mouth daily.  amLODIPine (NORVASC) 5 mg tablet Take 5 mg by mouth daily.  LINZESS 145 mcg cap capsule Take 145 mcg by mouth daily.  aspirin delayed-release 81 mg tablet Take 81 mg by mouth daily.  atorvastatin (LIPITOR) 80 mg tablet Take 1 Tab by mouth daily. 90 Tab 3    Cholecalciferol, Vitamin D3, 50,000 unit cap Take 1 Cap by mouth every seven (7) days.  PROPYLENE GLYCOL/ (SYSTANE OP) Apply  to eye as needed.  albuterol (PROVENTIL HFA, VENTOLIN HFA) 90 mcg/actuation inhaler Take 2 Puffs by inhalation as needed.  cetirizine (ZYRTEC) 10 mg tablet Take 10 mg by mouth as needed.  acetaminophen-codeine (Tylenol-Codeine #3) 300-30 mg per tablet Take 1 Tablet by mouth every eight (8) hours as needed for Pain for up to 30 days. Max Daily Amount: 3 Tablets.  90 Tablet 2       The patient has a family history of    Social History     Tobacco Use    Smoking status: Never Smoker    Smokeless tobacco: Never Used   Substance Use Topics    Alcohol use: No     Alcohol/week: 0.0 standard drinks    Drug use: No       Lab Results   Component Value Date/Time    Cholesterol, total 160 02/21/2015 02:02 AM    HDL Cholesterol 73 (H) 02/21/2015 02:02 AM    LDL, calculated 76.6 02/21/2015 02:02 AM    Triglyceride 52 02/21/2015 02:02 AM    CHOL/HDL Ratio 2.2 02/21/2015 02:02 AM        BP Readings from Last 3 Encounters:   03/23/22 122/72   11/03/21 (!) 173/77   06/10/21 (!) 156/70        Pulse Readings from Last 3 Encounters:   03/23/22 74   11/03/21 84   10/21/21 82       Wt Readings from Last 3 Encounters:   03/23/22 51.3 kg (113 lb)   10/21/21 51.1 kg (112 lb 9.6 oz)   06/10/21 54.9 kg (121 lb)         EKG: unchanged from previous tracings, normal sinus rhythm.

## 2022-04-26 ENCOUNTER — OFFICE VISIT (OUTPATIENT)
Dept: ORTHOPEDIC SURGERY | Age: 82
End: 2022-04-26
Payer: MEDICARE

## 2022-04-26 VITALS
RESPIRATION RATE: 16 BRPM | HEART RATE: 87 BPM | BODY MASS INDEX: 20.8 KG/M2 | HEIGHT: 62 IN | OXYGEN SATURATION: 98 % | TEMPERATURE: 98.3 F | WEIGHT: 113 LBS

## 2022-04-26 DIAGNOSIS — Z79.891 USE OF OPIATES FOR THERAPEUTIC PURPOSES: ICD-10-CM

## 2022-04-26 DIAGNOSIS — M62.838 MUSCLE SPASM: ICD-10-CM

## 2022-04-26 DIAGNOSIS — F03.90 DEMENTIA WITHOUT BEHAVIORAL DISTURBANCE, UNSPECIFIED DEMENTIA TYPE: ICD-10-CM

## 2022-04-26 DIAGNOSIS — M47.816 LUMBAR FACET ARTHROPATHY: ICD-10-CM

## 2022-04-26 DIAGNOSIS — N18.30 STAGE 3 CHRONIC KIDNEY DISEASE, UNSPECIFIED WHETHER STAGE 3A OR 3B CKD (HCC): ICD-10-CM

## 2022-04-26 DIAGNOSIS — G89.29 OTHER CHRONIC PAIN: Primary | ICD-10-CM

## 2022-04-26 PROCEDURE — 1101F PT FALLS ASSESS-DOCD LE1/YR: CPT | Performed by: PHYSICAL MEDICINE & REHABILITATION

## 2022-04-26 PROCEDURE — 1090F PRES/ABSN URINE INCON ASSESS: CPT | Performed by: PHYSICAL MEDICINE & REHABILITATION

## 2022-04-26 PROCEDURE — 99213 OFFICE O/P EST LOW 20 MIN: CPT | Performed by: PHYSICAL MEDICINE & REHABILITATION

## 2022-04-26 PROCEDURE — G8420 CALC BMI NORM PARAMETERS: HCPCS | Performed by: PHYSICAL MEDICINE & REHABILITATION

## 2022-04-26 PROCEDURE — G8536 NO DOC ELDER MAL SCRN: HCPCS | Performed by: PHYSICAL MEDICINE & REHABILITATION

## 2022-04-26 PROCEDURE — G8756 NO BP MEASURE DOC: HCPCS | Performed by: PHYSICAL MEDICINE & REHABILITATION

## 2022-04-26 PROCEDURE — G9717 DOC PT DX DEP/BP F/U NT REQ: HCPCS | Performed by: PHYSICAL MEDICINE & REHABILITATION

## 2022-04-26 PROCEDURE — G8427 DOCREV CUR MEDS BY ELIG CLIN: HCPCS | Performed by: PHYSICAL MEDICINE & REHABILITATION

## 2022-04-26 RX ORDER — ACETAMINOPHEN AND CODEINE PHOSPHATE 300; 30 MG/1; MG/1
1 TABLET ORAL
Qty: 75 TABLET | Refills: 2 | Status: SHIPPED | OUTPATIENT
Start: 2022-04-26 | End: 2022-05-26

## 2022-04-26 NOTE — PROGRESS NOTES
[facility]  [address] [place], [facility] [address]  Phone: [phone]  Fax: [phone]    Pt's YOB: 1940    ASSESSMENT   Diagnoses and all orders for this visit:    1. Other chronic pain  -     acetaminophen-codeine (TYLENOL #3) 300-30 mg per tablet; Take 1 Tablet by mouth three (3) times daily as needed (TID prn chronic pain) for up to 30 days. Max Daily Amount: 3 Tablets. 2. Use of opiates for therapeutic purposes  -     DRUG SCREEN UR - W/ CONFIRM; Future  -     acetaminophen-codeine (TYLENOL #3) 300-30 mg per tablet; Take 1 Tablet by mouth three (3) times daily as needed (TID prn chronic pain) for up to 30 days. Max Daily Amount: 3 Tablets. 3. Lumbar facet arthropathy  -     acetaminophen-codeine (TYLENOL #3) 300-30 mg per tablet; Take 1 Tablet by mouth three (3) times daily as needed (TID prn chronic pain) for up to 30 days. Max Daily Amount: 3 Tablets. 4. Stage 3 chronic kidney disease, unspecified whether stage 3a or 3b CKD ([address]    5. Muscle spasm    6. Dementia without behavioral disturbance, unspecified dementia type ([address]         IMPRESSION AND PLAN:  Marty Lopez is a [de-identified] y.o. female with history of chronic lumbar pain. She complains of continued lumbar pain but states that her pain is well managed. Pt is taking Tylenol #3 1 tab BID-TID as her pain warrants. 1) Pt was given information on lumbar arthritis exercises. 2) Pt received refills of Tylenol #3 ( MME is 9-13.5 and her  administers her medication and keeps it secure). 3) I recommended the patient try OTC Aspercaine, Salonpas, or capsaicin patches if needed. 4) A UDS was attempted at today's office visit but the urine was accidentally spilled; will obtain at next office visit. 5) Medrol Dosepak offered but pt declined. 6) Pt is not a candidate for NSAID's due to renal disease.    7) Ms. Keysha Olvera has a reminder for a \"due or due soon\" health maintenance. I have asked that she contact her primary care provider, Shoaib Soto DO, for follow-up on this health maintenance. 8)  demonstrated consistency with prescribing. 9) Last UDS from 10/21/2021 was consistent. Follow-up and Dispositions    · Return in about 3 months (around 7/26/2022) for Medication follow up. HISTORY OF PRESENT ILLNESS:  Néstor Tillman is a [de-identified] y.o. female with history of chronic lumbar pain and presents to the office today for medication follow up, accompanied by her . Pt complains of continued lumbar pain but states that her pain is well managed. She confirms that her pain is improved with warmer weather and exacerbated with cold and rain. She also states that she eats and sleeps well. Her  notes that she has been losing weight despite not restricting her diet. Pt states she takes Tylenol #3 rarely as needed; her  states that she generally takes 1 tab QAM, 1 tab in the evening, and occasionally 1 tab in the afternoon. Her  states that they have Narcan at home. Pt states that she has not taken oral steroids in a long time. She notes a history of asthma since her youth and admits to rhinorrhea at today's office visit. Pt at this time desires to continue with current care. Pain Scale: 2/10    PCP: Shoaib Soto DO     Past Medical History:   Diagnosis Date    Anemia NEC     Aneurysm ([address]     left kidney    Arthritis     Asthma     Cardiac catheterization 02/23/2015    R dominant. LM patent. mLAD 20%. CX patent. mRCA 20%. LVEDP 8.  EF 60%. Mild AS. RA 3/1. PA 24. W 6/5.  CI 2.94.    Cardiac echocardiogram 07/27/2016    EF 60-65%. No WMA. Mild LVH. Normal LV diastolic fx. Mild LAE. Mod-severe AS (mean grad 34 mmHg). AS has worsened since study of 1/20/15. Mild MR.  Cardiac nuclear imaging test 02/10/2015    No evidence of ischemia or prior infarction. Hyperdynamic LV fx.  EF >70%. No RWMA.   Nondiagnostic EKG on pharm stress test.  Low risk.  Carotid duplex 01/12/2016    Mild < 50% bilateral ICA stenosis.  Chest tightness     exertional, in the setting of widely patent coronary arteries, possibly related to elevated left ventricular end-diastolic pressure or possibly to small-vessel disease, improved on Cardizem CD in place of Verapamil    Chronic kidney disease, stage III (moderate) (HCC)     Essential hypertension     Glaucoma     Heart murmur     Hypercholesterolemia     Hypoglycemia     Raynaud's syndrome     Renal cell carcinoma (HCC)     Stage T1a Furhman Grade 2 s/p right open partial nephrectomy 2/22/10      S/p nephrectomy 2/22/10    right    Scoliosis     Stroke [facility]) 2/2015    resid los of balance        Social History     Socioeconomic History    Marital status:      Spouse name: Not on file    Number of children: Not on file    Years of education: Not on file    Highest education level: Not on file   Occupational History    Not on file   Tobacco Use    Smoking status: Never Smoker    Smokeless tobacco: Never Used   Substance and Sexual Activity    Alcohol use: No     Alcohol/week: 0.0 standard drinks    Drug use: No    Sexual activity: Yes     Partners: Male   Other Topics Concern    Not on file   Social History Narrative    Not on file     Social Determinants of Health     Financial Resource Strain:     Difficulty of Paying Living Expenses: Not on file   Food Insecurity:     Worried About Running Out of Food in the Last Year: Not on file    Reji of Food in the Last Year: Not on file   Transportation Needs:     Lack of Transportation (Medical): Not on file    Lack of Transportation (Non-Medical):  Not on file   Physical Activity:     Days of Exercise per Week: Not on file    Minutes of Exercise per Session: Not on file   Stress:     Feeling of Stress : Not on file   Social Connections:     Frequency of Communication with Friends and Family: Not on file    Frequency of Social Gatherings with Friends and Family: Not on file    Attends Catholic Services: Not on file    Active Member of Clubs or Organizations: Not on file    Attends Club or Organization Meetings: Not on file    Marital Status: Not on file   Intimate Partner Violence:     Fear of Current or Ex-Partner: Not on file    Emotionally Abused: Not on file    Physically Abused: Not on file    Sexually Abused: Not on file   Housing Stability:     Unable to Pay for Housing in the Last Year: Not on file    Number of [place] in the Last Year: Not on file    Unstable Housing in the Last Year: Not on file       Current Outpatient Medications   Medication Sig Dispense Refill    acetaminophen-codeine (TYLENOL #3) 300-30 mg per tablet Take 1 Tablet by mouth three (3) times daily as needed (TID prn chronic pain) for up to 30 days. Max Daily Amount: 3 Tablets. 75 Tablet 2    potassium chloride SR (K-TAB) 20 mEq tablet Take 1 Tablet by mouth two (2) times a day.  polyethylene glycol (MIRALAX) 17 gram/dose powder Take 17 g by mouth daily.  mirtazapine (REMERON) 15 mg tablet Take 1 Tablet by mouth daily.  ipratropium (ATROVENT) 21 mcg (0.03 %) nasal spray 1 [place] by Both Nostrils route daily as needed.  donepeziL (ARICEPT) 5 mg tablet Take 1 Tablet by mouth daily.  cyanocobalamin (VITAMIN B12) 100 mcg tablet Take 100 mcg by mouth daily.  losartan (COZAAR) 100 mg tablet Take 1 Tab by mouth daily. 90 Tab 3    furosemide (LASIX) 20 mg tablet Take 20 mg by mouth daily.  amLODIPine (NORVASC) 5 mg tablet Take 5 mg by mouth daily.  LINZESS 145 mcg cap capsule Take 145 mcg by mouth daily.  aspirin delayed-release 81 mg tablet Take 81 mg by mouth daily.  atorvastatin (LIPITOR) 80 mg tablet Take 1 Tab by mouth daily. 90 Tab 3    Cholecalciferol, Vitamin D3, 50,000 unit cap Take 1 Cap by mouth every seven (7) days.       PROPYLENE GLYCOL/ (SYSTANE OP) Apply  to eye as needed.  albuterol (PROVENTIL HFA, VENTOLIN HFA) 90 mcg/actuation inhaler Take 2 Puffs by inhalation as needed.  cetirizine (ZYRTEC) 10 mg tablet Take 10 mg by mouth as needed.  acetaminophen-codeine (Tylenol-Codeine #3) 300-30 mg per tablet Take 1 Tablet by mouth every eight (8) hours as needed for Pain for up to 30 days. Max Daily Amount: 3 Tablets. 90 Tablet 2    methylPREDNISolone (MEDROL DOSEPACK) 4 mg tablet Per dose pack instructions (Patient not taking: Reported on 4/26/2022) 1 Dose Pack 0       Allergies   Allergen Reactions    Vicodin [Hydrocodone-Acetaminophen] Nausea and Vomiting         REVIEW OF SYSTEMS    Constitutional: Negative for fever or chills. Positive for unintentional weight loss. Respiratory: Negative for cough or shortness of breath. Positive for runny nose. Cardiovascular: Negative for chest pain or palpitations. Gastrointestinal: Negative for acid reflux, change in bowel habits, or constipation. Genitourinary: Negative for dysuria and flank pain. Musculoskeletal: Positive for lumbar pain. Skin: Negative for rash. Neurological: Negative for headaches, dizziness, or numbness. Endo/Heme/Allergies: Negative for increased bruising. Psychiatric/Behavioral: Negative for difficulty with sleep. As per HPI    PHYSICAL EXAMINATION  Visit Vitals  Pulse 87   Temp 98.3 °F (36.8 °C) (Tympanic)   Resp 16   Ht 5' 2\" (1.575 m)   Wt 113 lb (51.3 kg)   SpO2 98%   BMI 20.67 kg/m²       Constitutional: Awake, alert, and in no acute distress. Neurological: Sensation to light touch is intact. Skin: warm, dry, and intact. Musculoskeletal: Mild  pain with extension, axial loading, and less with forward flexion. No pain with internal or external rotation of her hips. Negative straight leg raise bilaterally.       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     FINDINGS:     Alignment: Intact lordosis  Vertebral body height: Normal  Marrow signal: Bone marrow demonstrates no signal changes to suggest acute edema  and there is no signal changes to suggest metastatic disease posterior bony  elements side from arthritic changes show no destructive changes  Other:   There are benign cysts in the kidneys on the right and left side given history  cannot kidney cancer however this does not compromise a complete diagnostic  study there is some distortion of the cortex of the right kidney on image 25  series 5 which may represent a previous partial nephrectomy  Disc spaces: Preserved height and signal intensity  Conus: Terminates at T12-L1     Axial imaging correlation:           L1-2: Patent canal and foramina.     L2-3: Mild midline annular bulge no central canal stenosis     Bulge projects into the L2-3 right neuroforamina exiting nerve root not  compressed     Central canal left neuroforamina unremarkable     L3-4: Mild annular bulge mild intradiscal degenerative changes Central canal  left and right neuroforamina are not stenotic exiting nerve roots are not  compressed there is moderate hypertrophic and arthritic changes in the facets     L4-5: Mild signal loss mild disc space narrowing there is anterior subluxation  of L4 on 5 measuring approximately 0.6 cm     The annulus demonstrates a mild protrusion-type bulge in the central canal  central canal is nonstenotic     Bulge projects in the left neuroforamina exiting nerve root is not compressed  bulge projects in right neuroforamina exiting nerve root is not compressed  facets demonstrate advanced arthritic and hypertrophic changes in degree of  subluxation has increased from 2016     L5-S1: Mild facet arthropathy slightly more accelerated on the right side there  is a mild signal loss in the disc space     Central canal neuroforamina unremarkable     Incidental note is made of Tarlov cysts bilaterally at the S1-S2     Impression  1. Progressive degenerative changes and subluxation of L4 on 5 secondary to  facet arthropathy marked facet arthropathy with small right-sided stenosis mild  facet arthropathy identified 5 S1  Moderate facet arthropathy L3-4     No evidence of any metastatic disease lumbar spinal column     Benign cysts in the kidneys for the right kidney probably secondary to old  surgery cannot however consider this a complete study of the kidneys to exclude  recurrent kidney cancer    Written by Ankur Shen, as dictated by Norah Bach MD.  I, Dr. Norah Bach confirm that all documentation is accurate.

## 2022-04-26 NOTE — PROGRESS NOTES
Chief Complaint   Patient presents with    Follow-up       Pt preferred language for health care discussion is english. Is someone accompanying this pt? no    Is the patient using any DME equipment during OV? no    Depression Screening:  3 most recent [facility] Screens 3/23/2022 3/4/2021 12/3/2020 9/3/2020 1/9/2020 10/10/2019 7/11/2019   PHQ Not Done - - Patient Decline Patient Decline - - -   Little interest or pleasure in doing things Not at all Not at all - - Not at all Not at all Not at all   Feeling down, depressed, irritable, or hopeless Not at all Not at all - - Not at all Not at all Not at all   Total Score PHQ 2 0 0 - - 0 0 0       Learning Assessment:  Learning Assessment 7/26/2016 2/5/2015 10/3/2014 6/3/2014   PRIMARY LEARNER Patient Patient Patient Patient   HIGHEST LEVEL OF EDUCATION - PRIMARY LEARNER  - - [address] LEARNER - - - [address] CAREGIVER - - No No   PRIMARY LANGUAGE ENGLISH ENGLISH ENGLISH ENGLISH   LEARNER PREFERENCE PRIMARY DEMONSTRATION LISTENING LISTENING LISTENING     - READING - READING   ANSWERED BY Patient patient patient patient   RELATIONSHIP SELF SELF SELF SELF       Abuse Screening:  Abuse Screening Questionnaire 3/23/2022   Do you ever feel afraid of your partner? N   Are you in a relationship with someone who physically or mentally threatens you? N   Is it safe for you to go home? Y       Fall Risk  Fall Risk Assessment, last 12 mths 3/23/2022   Able to walk? Yes   Fall in past 12 months? 0   Do you feel unsteady? 0   Are you worried about falling 0   Number of falls in past 12 months -   Fall with injury? -           Advance Directive:  1. Do you have an advance directive in place? Patient Reply:no    2. If not, would you like material regarding how to put one in place? Patient Reply: no    2. Per patient no changes to their ACP contact no. Coordination of Care:  1.  Have you been to the ER, urgent care clinic since your last visit? Hospitalized since your last visit? no    2. Have you seen or consulted any other health care providers outside of the [address] [place] [address] since your last visit? Include any pap smears or colon screening.  no

## 2022-07-26 ENCOUNTER — OFFICE VISIT (OUTPATIENT)
Dept: ORTHOPEDIC SURGERY | Age: 82
End: 2022-07-26
Payer: MEDICARE

## 2022-07-26 VITALS
RESPIRATION RATE: 18 BRPM | BODY MASS INDEX: 20.98 KG/M2 | HEIGHT: 62 IN | TEMPERATURE: 97.1 F | HEART RATE: 72 BPM | OXYGEN SATURATION: 100 % | WEIGHT: 114 LBS

## 2022-07-26 DIAGNOSIS — Z79.891 USE OF OPIATES FOR THERAPEUTIC PURPOSES: ICD-10-CM

## 2022-07-26 DIAGNOSIS — M62.838 MUSCLE SPASM: ICD-10-CM

## 2022-07-26 DIAGNOSIS — N18.30 STAGE 3 CHRONIC KIDNEY DISEASE, UNSPECIFIED WHETHER STAGE 3A OR 3B CKD (HCC): ICD-10-CM

## 2022-07-26 DIAGNOSIS — Z86.16 PERSONAL HISTORY OF COVID-19: ICD-10-CM

## 2022-07-26 DIAGNOSIS — F03.90 DEMENTIA WITHOUT BEHAVIORAL DISTURBANCE, UNSPECIFIED DEMENTIA TYPE: ICD-10-CM

## 2022-07-26 DIAGNOSIS — G89.29 OTHER CHRONIC PAIN: Primary | ICD-10-CM

## 2022-07-26 DIAGNOSIS — M47.816 LUMBAR FACET ARTHROPATHY: ICD-10-CM

## 2022-07-26 PROCEDURE — G8427 DOCREV CUR MEDS BY ELIG CLIN: HCPCS | Performed by: PHYSICAL MEDICINE & REHABILITATION

## 2022-07-26 PROCEDURE — 1090F PRES/ABSN URINE INCON ASSESS: CPT | Performed by: PHYSICAL MEDICINE & REHABILITATION

## 2022-07-26 PROCEDURE — G9717 DOC PT DX DEP/BP F/U NT REQ: HCPCS | Performed by: PHYSICAL MEDICINE & REHABILITATION

## 2022-07-26 PROCEDURE — G8536 NO DOC ELDER MAL SCRN: HCPCS | Performed by: PHYSICAL MEDICINE & REHABILITATION

## 2022-07-26 PROCEDURE — G8420 CALC BMI NORM PARAMETERS: HCPCS | Performed by: PHYSICAL MEDICINE & REHABILITATION

## 2022-07-26 PROCEDURE — 1123F ACP DISCUSS/DSCN MKR DOCD: CPT | Performed by: PHYSICAL MEDICINE & REHABILITATION

## 2022-07-26 PROCEDURE — 99214 OFFICE O/P EST MOD 30 MIN: CPT | Performed by: PHYSICAL MEDICINE & REHABILITATION

## 2022-07-26 PROCEDURE — G8756 NO BP MEASURE DOC: HCPCS | Performed by: PHYSICAL MEDICINE & REHABILITATION

## 2022-07-26 PROCEDURE — 1101F PT FALLS ASSESS-DOCD LE1/YR: CPT | Performed by: PHYSICAL MEDICINE & REHABILITATION

## 2022-07-26 RX ORDER — MONTELUKAST SODIUM 10 MG/1
10 TABLET ORAL DAILY
COMMUNITY
Start: 2022-06-13

## 2022-07-26 RX ORDER — ACETAMINOPHEN AND CODEINE PHOSPHATE 300; 30 MG/1; MG/1
1 TABLET ORAL
Qty: 75 TABLET | Refills: 2 | Status: SHIPPED | OUTPATIENT
Start: 2022-07-26 | End: 2022-08-25

## 2022-07-26 NOTE — PATIENT INSTRUCTIONS
Low Back Arthritis: Exercises  Introduction  Here are some examples of typical rehabilitation exercises for your condition. Start each exercise slowly. Ease off the exercise if you start to have pain. Your doctor or physical therapist will tell you when you can start these exercises and which ones will work best for you. When you are not being active, find a comfortable position for rest. Some people are comfortable on the floor or a medium-firm bed with a small pillow under their head and another under their knees. Some people prefer to lie on their side with a pillow between their knees. Don't stay in one position for too long. Take short walks (10 to 20 minutes) every 2 to 3 hours. Avoid slopes, hills, and stairs until you feel better. Walk only distances you can manage without pain, especially leg pain. How to do the exercises  Pelvic tilt    Lie on your back with your knees bent. \"Brace\" your stomach--tighten your muscles by pulling in and imagining your belly button moving toward your spine. Press your lower back into the floor. You should feel your hips and pelvis rock back. Hold for 6 seconds while breathing smoothly. Relax and allow your pelvis and hips to rock forward. Repeat 8 to 12 times. Back stretches    Get down on your hands and knees on the floor. Relax your head and allow it to droop. Round your back up toward the ceiling until you feel a nice stretch in your upper, middle, and lower back. Hold this stretch for as long as it feels comfortable, or about 15 to 30 seconds. Return to the starting position with a flat back while you are on your hands and knees. Let your back sway by pressing your stomach toward the floor. Lift your buttocks toward the ceiling. Hold this position for 15 to 30 seconds. Repeat 2 to 4 times. Follow-up care is a key part of your treatment and safety. Be sure to make and go to all appointments, and call your doctor if you are having problems.  It's also a good idea to know your test results and keep a list of the medicines you take. Where can you learn more? Go to http://www.ITema.com/  Enter T094 in the search box to learn more about \"Low Back Arthritis: Exercises. \"  Current as of: July 1, 2021               Content Version: 13.2  © 2006-2022 [employer]wise, [employer]. Care instructions adapted under license by [employer] (which disclaims liability or warranty for this information). If you have questions about a medical condition or this instruction, always ask your healthcare professional. [address] any warranty or liability for your use of this information.

## 2022-07-26 NOTE — PROGRESS NOTES
[facility]  [address], [address]  Phone: [phone]  Fax: [phone]    Pt's YOB: 1940    ASSESSMENT   Diagnoses and all orders for this visit:    1. Other chronic pain  -     acetaminophen-codeine (Tylenol-Codeine #3) 300-30 mg per tablet; Take 1 Tablet by mouth every eight (8) hours as needed for Pain for up to 30 days. Max Daily Amount: 3 Tablets. 2. Use of opiates for therapeutic purposes  -     acetaminophen-codeine (Tylenol-Codeine #3) 300-30 mg per tablet; Take 1 Tablet by mouth every eight (8) hours as needed for Pain for up to 30 days. Max Daily Amount: 3 Tablets. 3. Lumbar facet arthropathy  -     acetaminophen-codeine (Tylenol-Codeine #3) 300-30 mg per tablet; Take 1 Tablet by mouth every eight (8) hours as needed for Pain for up to 30 days. Max Daily Amount: 3 Tablets. 4. Muscle spasm    5. Stage 3 chronic kidney disease, unspecified whether stage 3a or 3b CKD ([facility] [address]    6. Dementia without behavioral disturbance, unspecified dementia type ([facility] [address]    7. Personal history of COVID-19       IMPRESSION AND PLAN:  Geanie Kehr is a [de-identified] y.o. female with history of chronic lumbar pain. She complains of continued lumbar pain but states that her pain is well managed. Pt is taking Tylenol #3 1 tab BID-TID as her pain warrants. Bernadette Money 1) Pt was given information on lumbar arthritis exercises. 2) Counseled pt and her  on dosage of Vitamin D and B12.  3) Pt received refills of Tylenol #3 ( MME is 9-13.5 and her  administers her medication and keeps it secure). 4) Ms. Nur Said has a reminder for a \"due or due soon\" health maintenance. I have asked that she contact her primary care provider, Alexandre Frias DO, for follow-up on this health maintenance. 5)  demonstrated consistency with prescribing. 6) Last UDS from 10/21/2021 was consistent. 7) Pt is not a candidate for NSAID's due to renal disease. Follow-up and Dispositions    Return in about 3 months (around 10/26/2022) for Medication follow up. HISTORY OF PRESENT ILLNESS:  Yusuf Mehta is a [de-identified] y.o. female with history of chronic lumbar pain and presents to the office today for medication follow up, accompanied by her . Pt's  mentions she was previously diagnosed with COVID-19 and was affected for nineteen days. She further mentions having lost her prescription of Tylenol #3 in the midst of dealing with her sickness. Pt notes she is taking the Tylenol #3 up to three times a day as her pain warrants, and she mentions having worsening pain within the time she lost the medication to presently. Her husbands notes she has been having trouble with weakness in her legs, she does have a single point walker to help her get around. Pt at this time desires to continue with current care. Pain Scale: 3/10    PCP: Radu Crowell DO     Past Medical History:   Diagnosis Date    Anemia NEC     Aneurysm ([facility] [address]     left kidney    Arthritis     Asthma     Cardiac catheterization 02/23/2015    R dominant. LM patent. mLAD 20%. CX patent. mRCA 20%. LVEDP 8.  EF 60%. Mild AS. RA 3/1. PA 24. W 6/5.  CI 2.94. Cardiac echocardiogram 07/27/2016    EF 60-65%. No WMA. Mild LVH. Normal LV diastolic fx. Mild LAE. Mod-severe AS (mean grad 34 mmHg). AS has worsened since study of 1/20/15. Mild MR. Cardiac nuclear imaging test 02/10/2015    No evidence of ischemia or prior infarction. Hyperdynamic LV fx.  EF >70%. No RWMA. Nondiagnostic EKG on pharm stress test.  Low risk. Carotid duplex 01/12/2016    Mild < 50% bilateral ICA stenosis.       Chest tightness     exertional, in the setting of widely patent coronary arteries, possibly related to elevated left ventricular end-diastolic pressure or possibly to small-vessel disease, improved on Cardizem CD in place of Verapamil    Chronic kidney disease, stage III (moderate) ([address] Essential hypertension     Glaucoma     Heart murmur     Hypercholesterolemia     Hypoglycemia     Raynaud's syndrome     Renal cell carcinoma (HCC)     Stage T1a Furhman Grade 2 s/p right open partial nephrectomy 2/22/10      S/p nephrectomy 2/22/10    right    Scoliosis     [facility] 2/2015    resid los of balance        Social History     Socioeconomic History    Marital status:      Spouse name: Not on file    Number of children: Not on file    Years of education: Not on file    Highest education level: Not on file   Occupational History    Not on file   Tobacco Use    Smoking status: Never    Smokeless tobacco: Never   Substance and Sexual Activity    Alcohol use: No     Alcohol/week: 0.0 standard drinks    Drug use: No    Sexual activity: Yes     Partners: Male   Other Topics Concern    Not on file   Social History Narrative    Not on file     Social Determinants of Health     Financial Resource Strain: Not on file   Food Insecurity: Not on file   Transportation Needs: Not on file   Physical Activity: Not on file   Stress: Not on file   Social Connections: Not on file   Intimate Partner Violence: Not on file   Housing Stability: Not on file       Current Outpatient Medications   Medication Sig Dispense Refill    montelukast (SINGULAIR) 10 mg tablet Take 10 mg by mouth in the morning. acetaminophen-codeine (Tylenol-Codeine #3) 300-30 mg per tablet Take 1 Tablet by mouth every eight (8) hours as needed for Pain for up to 30 days. Max Daily Amount: 3 Tablets. 75 Tablet 2    potassium chloride SR (K-TAB) 20 mEq tablet Take 1 Tablet by mouth two (2) times a day. polyethylene glycol (MIRALAX) 17 gram/dose powder Take 17 g by mouth daily. mirtazapine (REMERON) 15 mg tablet Take 1 Tablet by mouth daily. ipratropium (ATROVENT) 21 mcg (0.03 %) nasal spray 1 Hanahan by Both Nostrils route daily as needed. donepeziL (ARICEPT) 5 mg tablet Take 1 Tablet by mouth daily.       cyanocobalamin (VITAMIN B12) 100 mcg tablet Take 100 mcg by mouth daily. losartan (COZAAR) 100 mg tablet Take 1 Tab by mouth daily. 90 Tab 3    furosemide (LASIX) 20 mg tablet Take 20 mg by mouth daily. amLODIPine (NORVASC) 5 mg tablet Take 5 mg by mouth daily. LINZESS 145 mcg cap capsule Take 145 mcg by mouth daily. aspirin delayed-release 81 mg tablet Take 81 mg by mouth daily. atorvastatin (LIPITOR) 80 mg tablet Take 1 Tab by mouth daily. 90 Tab 3    Cholecalciferol, Vitamin D3, 50,000 unit cap Take 1 Cap by mouth every seven (7) days. PROPYLENE GLYCOL/ (SYSTANE OP) Apply  to eye as needed. albuterol (PROVENTIL HFA, VENTOLIN HFA) 90 mcg/actuation inhaler Take 2 Puffs by inhalation as needed. cetirizine (ZYRTEC) 10 mg tablet Take 10 mg by mouth as needed. acetaminophen-codeine (TYLENOL #3) 300-30 mg per tablet Take 1 Tablet by mouth three (3) times daily as needed (TID prn chronic pain) for up to 30 days. Max Daily Amount: 3 Tablets. 75 Tablet 2    methylPREDNISolone (MEDROL DOSEPACK) 4 mg tablet Per dose pack instructions (Patient not taking: No sig reported) 1 Dose Pack 0       Allergies   Allergen Reactions    Vicodin [Hydrocodone-Acetaminophen] Nausea and Vomiting         REVIEW OF SYSTEMS    Constitutional: Negative for fever, chills, or weight change. Respiratory: Negative for cough or shortness of breath. Cardiovascular: Negative for chest pain or palpitations. Gastrointestinal: Negative for acid reflux, change in bowel habits, or constipation. Genitourinary: Negative for dysuria and flank pain. Musculoskeletal: Positive for lumbar pain. Skin: Negative for rash. Neurological: Negative for headaches, dizziness, or numbness. Endo/Heme/Allergies: Negative for increased bruising. Psychiatric/Behavioral: Negative for difficulty with sleep.     As per HPI    PHYSICAL EXAMINATION  Visit Vitals  Pulse 72   Temp 97.1 °F (36.2 °C) (Temporal)   Resp 18   Ht 5' 2\" (1.575 m)   Wt 114 lb (51.7 kg)   SpO2 100%   BMI 20.85 kg/m²       Constitutional: Awake, alert, and in no acute distress. Neurological: Sensation to light touch is intact. Negative Mariano's sign bilaterally. Skin: warm, dry, and intact. Musculoskeletal: Mild pain with extension, axial loading, less with forward flexion. Mild difficulty transitioning sit to stand No pain with internal or external rotation of her hips. Negative straight leg raise bilaterally. Biceps  Triceps Deltoids Wrist Ext Wrist Flex Hand Intrin   Right +4/5 +4/5 +4/5 +4/5 +4/5 +4/5   Left +4/5 +4/5 +4/5 +4/5 +4/5 +4/5      Hip Flex  Quads Hamstrings Ankle DF EHL Ankle PF   Right +4/5 +4/5 +4/5 +4/5 +4/5 +4/5   Left +4/5 +4/5 +4/5 +4/5 +4/5 +4/5     IMAGING:    Lumbar MRI from 07/26/2021 was personally reviewed with pt and her  and demonstrated:  MRI Results (most recent):  Results from [place] encounter on 07/26/21     MRI LUMB SPINE WO CONT     Narrative  EXAM: MRI LUMB SPINE WO CONT     CLINICAL INDICATIONS/HISTORY: Renal cell cancer squamous cell cancer cyst  removed from the spine radicular complaints neurogenic claudication spinal  stenosis     COMPARISON: March 2016     Technique: Multi-sequence multiplanar T1, T2, STIR imaging with and without fat  saturation obtained centered on the lumbar spine. FINDINGS:     Alignment: Intact lordosis  Vertebral body height: Normal  Marrow signal: Bone marrow demonstrates no signal changes to suggest acute edema  and there is no signal changes to suggest metastatic disease posterior bony  elements side from arthritic changes show no destructive changes  Other:   There are benign cysts in the kidneys on the right and left side given history  cannot kidney cancer however this does not compromise a complete diagnostic  study there is some distortion of the cortex of the right kidney on image 25  series 5 which may represent a previous partial nephrectomy  Disc spaces: Preserved height and signal intensity  Conus: Terminates at T12-L1     Axial imaging correlation:           L1-2: Patent canal and foramina. L2-3: Mild midline annular bulge no central canal stenosis     Bulge projects into the L2-3 right neuroforamina exiting nerve root not  compressed     Central canal left neuroforamina unremarkable     L3-4: Mild annular bulge mild intradiscal degenerative changes Central canal  left and right neuroforamina are not stenotic exiting nerve roots are not  compressed there is moderate hypertrophic and arthritic changes in the facets     L4-5: Mild signal loss mild disc space narrowing there is anterior subluxation  of L4 on 5 measuring approximately 0.6 cm     The annulus demonstrates a mild protrusion-type bulge in the central canal  central canal is nonstenotic     Bulge projects in the left neuroforamina exiting nerve root is not compressed  bulge projects in right neuroforamina exiting nerve root is not compressed  facets demonstrate advanced arthritic and hypertrophic changes in degree of  subluxation has increased from 2016     L5-S1: Mild facet arthropathy slightly more accelerated on the right side there  is a mild signal loss in the disc space     Central canal neuroforamina unremarkable     Incidental note is made of Tarlov cysts bilaterally at the S1-S2     Impression  1.  Progressive degenerative changes and subluxation of L4 on 5 secondary to  facet arthropathy marked facet arthropathy with small right-sided stenosis mild  facet arthropathy identified 5 S1  Moderate facet arthropathy L3-4     No evidence of any metastatic disease lumbar spinal column     Benign cysts in the kidneys for the right kidney probably secondary to old  surgery cannot however consider this a complete study of the kidneys to exclude  recurrent kidney cancer    Written by Fady Cervantes, as dictated by Ham Villalba MD.  I, Dr. Ham Villalba confirm that all documentation is accurate.

## 2022-10-05 ENCOUNTER — OFFICE VISIT (OUTPATIENT)
Dept: CARDIOLOGY CLINIC | Age: 82
End: 2022-10-05
Payer: MEDICARE

## 2022-10-05 VITALS
DIASTOLIC BLOOD PRESSURE: 80 MMHG | HEIGHT: 62 IN | BODY MASS INDEX: 21.53 KG/M2 | OXYGEN SATURATION: 100 % | HEART RATE: 74 BPM | WEIGHT: 117 LBS | SYSTOLIC BLOOD PRESSURE: 140 MMHG

## 2022-10-05 DIAGNOSIS — I35.0 AORTIC VALVE STENOSIS, ETIOLOGY OF CARDIAC VALVE DISEASE UNSPECIFIED: Primary | ICD-10-CM

## 2022-10-05 DIAGNOSIS — I10 HYPERTENSION, UNSPECIFIED TYPE: ICD-10-CM

## 2022-10-05 PROCEDURE — G8420 CALC BMI NORM PARAMETERS: HCPCS | Performed by: INTERNAL MEDICINE

## 2022-10-05 PROCEDURE — 1123F ACP DISCUSS/DSCN MKR DOCD: CPT | Performed by: INTERNAL MEDICINE

## 2022-10-05 PROCEDURE — 99214 OFFICE O/P EST MOD 30 MIN: CPT | Performed by: INTERNAL MEDICINE

## 2022-10-05 PROCEDURE — G8753 SYS BP > OR = 140: HCPCS | Performed by: INTERNAL MEDICINE

## 2022-10-05 PROCEDURE — G8427 DOCREV CUR MEDS BY ELIG CLIN: HCPCS | Performed by: INTERNAL MEDICINE

## 2022-10-05 PROCEDURE — 1101F PT FALLS ASSESS-DOCD LE1/YR: CPT | Performed by: INTERNAL MEDICINE

## 2022-10-05 PROCEDURE — G8754 DIAS BP LESS 90: HCPCS | Performed by: INTERNAL MEDICINE

## 2022-10-05 PROCEDURE — G8536 NO DOC ELDER MAL SCRN: HCPCS | Performed by: INTERNAL MEDICINE

## 2022-10-05 PROCEDURE — 1090F PRES/ABSN URINE INCON ASSESS: CPT | Performed by: INTERNAL MEDICINE

## 2022-10-05 PROCEDURE — 93000 ELECTROCARDIOGRAM COMPLETE: CPT | Performed by: INTERNAL MEDICINE

## 2022-10-05 PROCEDURE — G9717 DOC PT DX DEP/BP F/U NT REQ: HCPCS | Performed by: INTERNAL MEDICINE

## 2022-10-05 NOTE — PROGRESS NOTES
Mo Cordon presents today for   Chief Complaint   Patient presents with    Follow-up     6 month follow up, wanted to be seen with         Mo Cordon preferred language for health care discussion is english/other. Is someone accompanying this pt? Yes,      Is the patient using any DME equipment during [address]? no    Depression Screening:  3 most recent PHQ Screens 3/23/2022   PHQ Not Done -   Little interest or pleasure in doing things Not at all   Feeling down, depressed, irritable, or hopeless Not at all   Total Score PHQ 2 0       Learning Assessment:  Learning Assessment 7/26/2016   PRIMARY LEARNER Patient   HIGHEST LEVEL OF EDUCATION - PRIMARY LEARNER  -   BARRIERS PRIMARY LEARNER -   CO-LEARNER CAREGIVER -   PRIMARY LANGUAGE ENGLISH   LEARNER PREFERENCE PRIMARY DEMONSTRATION     -   ANSWERED BY Patient   RELATIONSHIP SELF       Abuse Screening:  Abuse Screening Questionnaire 3/23/2022   Do you ever feel afraid of your partner? N   Are you in a relationship with someone who physically or mentally threatens you? N   Is it safe for you to go home? Y       Fall Risk  Fall Risk Assessment, last 12 mths 3/23/2022   Able to walk? Yes   Fall in past 12 months? 0   Do you feel unsteady? 0   Are you worried about falling 0   Number of falls in past 12 months -   Fall with injury? -       Pt currently taking Anticoagulant therapy? no    Coordination of Care:  1. Have you been to the ER, urgent care clinic since your last visit? Hospitalized since your last visit? no    2. Have you seen or consulted any other health care providers outside of the [address] [place] [address] since your last visit? Include any pap smears or colon screening.  no

## 2022-10-05 NOTE — PROGRESS NOTES
HISTORY OF PRESENT ILLNESS  Demi Reyes is a [de-identified] y.o. female. ASSESSMENT and PLAN    Ms. Eduardo Cotter has history of aortic stenosis. She underwent TAVR procedure in 2018 by Dr. Kacey Snider. Her preprocedure coronary angiography revealed widely patent coronary arteries. Subsequently, she had TAVR utilizing 26 mm Medtronic Evolute R stent graft. Her echocardiogram in July 2020 revealed normal LV function with well-functioning aortic stent graft. CAD:   She has no documented history of CAD. TAVR: Clinically stable. BP:   Mildly elevated but acceptable at 154/76. Rhythm: Stable sinus at 74 bpm.  CHF:    There is no evidence of decompensated CHF noted. Weight:    Her weight today is 117 pounds. This is near her baseline weight of 115 pounds. Cholesterol:   Target LDL <70. Lipitor 80. Anti-platelet:   Remains on ASA. Her major issue appears to be worsening dementia. I will see her back annually. Thank you. Encounter Diagnoses   Name Primary?  Aortic valve stenosis, etiology of cardiac valve disease unspecified Yes    Hypertension, unspecified type      current treatment plan is effective, no change in therapy  lab results and schedule of future lab studies reviewed with patient  reviewed diet, exercise and weight control  cardiovascular risk and specific lipid/LDL goals reviewed  use of aspirin to prevent MI and TIA's discussed    Follow-up  Pertinent negatives include no chest pain and no shortness of breath. Today, Ms. Judy Rader has no complaints of chest pains, increased shortness of breath or decreased exercise capacity. She denies any changes in her activities. She has dementia and she is accompanied by her . He confirmed her answers. She denies any orthopnea or PND. She denies any palpitations or dizziness. Review of Systems   Respiratory:  Negative for shortness of breath.     Cardiovascular:  Negative for chest pain, palpitations, orthopnea, claudication, leg swelling and PND.   All other systems reviewed and are negative. Physical Exam  Vitals and nursing note reviewed. Constitutional:       Appearance: Normal appearance. HENT:      Head: Normocephalic. Eyes:      Conjunctiva/sclera: Conjunctivae normal.   Neck:      Vascular: No carotid bruit. Cardiovascular:      Rate and Rhythm: Normal rate and regular rhythm. Heart sounds: Murmur heard. Crescendo decrescendo systolic murmur is present with a grade of 1/6. Pulmonary:      Breath sounds: Normal breath sounds. Abdominal:      Palpations: Abdomen is soft. Musculoskeletal:         General: No swelling. Cervical back: No rigidity. Skin:     General: Skin is warm and dry. Neurological:      General: No focal deficit present. Mental Status: She is alert and oriented to person, place, and time. Psychiatric:         Mood and Affect: Mood normal.         Behavior: Behavior normal.       PCP: Carlita Kelly DO    Past Medical History:   Diagnosis Date    Anemia NEC     Aneurysm ([address]     left kidney    Arthritis     Asthma     Cardiac catheterization 02/23/2015    R dominant. LM patent. mLAD 20%. CX patent. mRCA 20%. LVEDP 8.  EF 60%. Mild AS. RA 3/1. PA 24. W 6/5.  CI 2.94.    Cardiac echocardiogram 07/27/2016    EF 60-65%. No WMA. Mild LVH. Normal LV diastolic fx. Mild LAE. Mod-severe AS (mean grad 34 mmHg). AS has worsened since study of 1/20/15. Mild MR.  Cardiac nuclear imaging test 02/10/2015    No evidence of ischemia or prior infarction. Hyperdynamic LV fx.  EF >70%. No RWMA. Nondiagnostic EKG on pharm stress test.  Low risk.  Carotid duplex 01/12/2016    Mild < 50% bilateral ICA stenosis.       Chest tightness     exertional, in the setting of widely patent coronary arteries, possibly related to elevated left ventricular end-diastolic pressure or possibly to small-vessel disease, improved on Cardizem CD in place of Verapamil    Chronic kidney disease, stage III (moderate) (HCC)     Essential hypertension     Glaucoma     Heart murmur     Hypercholesterolemia     Hypoglycemia     Raynaud's syndrome     Renal cell carcinoma (HCC)     Stage T1a Furhman Grade 2 s/p right open partial nephrectomy 2/22/10      S/p nephrectomy 2/22/10    right    Scoliosis     Stroke [facility]) 2/2015    resid los of balance       Past Surgical History:   Procedure Laterality Date    COLONOSCOPY N/A 10/10/2016    COLONOS[facility] performed [facility] Leander [facility]on MD at [facility] ENDOSCOPY    HX AORTIC VALVE REPLACEMENT Bilateral 03/2012018    not allowed to have MRI    HX AORTIC VALVE REPLACEMENT      HX CYST REMOVAL      on spine    HX GYN      partial hysterectomy 1988, on [place] until a few years ago    HX HEART CATHETERIZATION  3/18/09    Low left-sided and right-sided filling pressures (likely related to overnight fasting). Normal resting pulmonary  arterial pressure. Normal systemic pressures. Borderline left ventricular end-diastolic pressure at rest. Mild incerease in pulmonary artery pressure and pulmonary capillary wedge pressure with exercise. Mild, hemodynamically nonsignificant epicardial coronary artery disease.  HX NEPHRECTOMY  2/22/10    s/p partial right nephrectomy for right kidney lower pole mass secondary to early stage cancer    HX OTHER SURGICAL      Ovary removal    HX TRABECULECTOMY      VASCULAR SURGERY PROCEDURE UNLIST  03/20/2018    artificial Heart Valve (Dr. Glenda Mariano with [facility])       Current Outpatient Medications   Medication Sig Dispense Refill    montelukast (SINGULAIR) 10 mg tablet Take 10 mg by mouth in the morning.  acetaminophen-codeine (Tylenol-Codeine #3) 300-30 mg per tablet Take 1 Tablet by mouth every eight (8) hours as needed for Pain for up to 30 days. Max Daily Amount: 3 Tablets.  75 Tablet 2    acetaminophen-codeine (TYLENOL #3) 300-30 mg per tablet Take 1 Tablet by mouth three (3) times daily as needed (TID prn chronic pain) for up to 30 days. Max Daily Amount: 3 Tablets. 75 Tablet 2    methylPREDNISolone (MEDROL DOSEPACK) 4 mg tablet Per dose pack instructions (Patient not taking: No sig reported) 1 Dose Pack 0    potassium chloride SR (K-TAB) 20 mEq tablet Take 1 Tablet by mouth two (2) times a day.  polyethylene glycol (MIRALAX) 17 gram/dose powder Take 17 g by mouth daily.  mirtazapine (REMERON) 15 mg tablet Take 1 Tablet by mouth daily.  ipratropium (ATROVENT) 21 mcg (0.03 %) nasal spray 1 [place] by Both Nostrils route daily as needed.  donepeziL (ARICEPT) 5 mg tablet Take 1 Tablet by mouth daily.  cyanocobalamin (VITAMIN B12) 100 mcg tablet Take 100 mcg by mouth daily.  losartan (COZAAR) 100 mg tablet Take 1 Tab by mouth daily. 90 Tab 3    furosemide (LASIX) 20 mg tablet Take 20 mg by mouth daily.  amLODIPine (NORVASC) 5 mg tablet Take 5 mg by mouth daily.  LINZESS 145 mcg cap capsule Take 145 mcg by mouth daily.  aspirin delayed-release 81 mg tablet Take 81 mg by mouth daily.  atorvastatin (LIPITOR) 80 mg tablet Take 1 Tab by mouth daily. 90 Tab 3    Cholecalciferol, Vitamin D3, 50,000 unit cap Take 1 Cap by mouth every seven (7) days.  PROPYLENE GLYCOL/ (SYSTANE OP) Apply  to eye as needed.  albuterol (PROVENTIL HFA, VENTOLIN HFA) 90 mcg/actuation inhaler Take 2 Puffs by inhalation as needed.  cetirizine (ZYRTEC) 10 mg tablet Take 10 mg by mouth as needed.          The patient has a family history of    Social History     Tobacco Use    Smoking status: Never    Smokeless tobacco: Never   Substance Use Topics    Alcohol use: No     Alcohol/week: 0.0 standard drinks    Drug use: No       Lab Results   Component Value Date/Time    Cholesterol, total 160 02/21/2015 02:02 AM    HDL Cholesterol 73 (H) 02/21/2015 02:02 AM    LDL, calculated 76.6 02/21/2015 02:02 AM    Triglyceride 52 02/21/2015 02:02 AM    CHOL/HDL Ratio 2.2 02/21/2015 02:02 AM        BP Readings from Last 3 Encounters:   10/05/22 (!) 140/80   03/23/22 122/72   11/03/21 (!) 173/77        Pulse Readings from Last 3 Encounters:   10/05/22 74   07/26/22 72   04/26/22 87       Wt Readings from Last 3 Encounters:   10/05/22 53.1 kg (117 lb)   07/26/22 51.7 kg (114 lb)   04/26/22 51.3 kg (113 lb)         EKG: normal EKG, normal sinus rhythm, unchanged from previous tracings.

## 2022-10-25 NOTE — PROGRESS NOTES
[facility]  [address] [place], [facility] [address]  Phone: [phone]  Fax: [phone]    Pt's YOB: 1940    ASSESSMENT   Diagnoses and all orders for this visit:    1. Other chronic pain  -     acetaminophen-codeine (Tylenol-Codeine #4) 300-60 mg tab; Take 1 Tablet by mouth two (2) times daily as needed for Pain for up to 3 days. Max Daily Amount: 2 Tablets. -     DRUG SCREEN UR - W/ CONFIRM; Future    2. Use of opiates for therapeutic purposes  -     acetaminophen-codeine (Tylenol-Codeine #4) 300-60 mg tab; Take 1 Tablet by mouth two (2) times daily as needed for Pain for up to 3 days. Max Daily Amount: 2 Tablets. -     DRUG SCREEN UR - W/ CONFIRM; Future    3. Lumbar facet arthropathy  -     DRUG SCREEN UR - W/ CONFIRM; Future    4. Stage 3 chronic kidney disease, unspecified whether stage 3a or 3b CKD ([facility] [address] [facility]    5. Dementia without behavioral disturbance, unspecified dementia type    6. Muscle spasm    7. S/p right nephrectomy for cancer in 1/10       IMPRESSION AND PLAN:  Gabriel Rosenberg is a [de-identified] y.o. female with history of chronic lumbar pain and presents to the office today for follow up, accompanied by her . Pt continues to complain of lumbar pain but notes her pain is well managed at this time. She notes she is taking the Tylenol #3 up to three times a day as her pain warrants, and she mentions having worsening pain. 1) Pt was given information on lumbar arthritis exercises. 2) She will increase the dosage of Tylenol from  mg to  mg and she received refills at this time. ( MME is 9-13.5 and her  administers her medication and keeps it secure). 3) She previously had tried this last year but did experience some nausea with it -- her  requests to try it again due to her worsening pain with the colder weather.   If she does not tolerate this, may try to decrease the dose back to Tylenol #3 and increase from bid to tid / qid as previously prescribed. 4) Ms. Berenice Posadas has a reminder for a \"due or due soon\" health maintenance. I have asked that she contact her primary care provider, Moiz Ernst DO, for follow-up on this health maintenance. 5)  demonstrated consistency with prescribing. 6) Last UDS from 04/26/2022 was consistent. 7) Pt is not a candidate for NSAID's due to renal disease. 8) A UDS was obtained at today's office visit. 9) Pt will follow up in 3 months       HISTORY OF PRESENT ILLNESS:  Petar Headley is a [de-identified] y.o. female with history of chronic lumbar pain and presents to the office today for follow up, accompanied by her . Pt continues to complain of lumbar pain but notes her pain is well managed at this time. Her  reports her pain has not improved with recent change to Tylenol #3 and would like to increase the dosage again. Pt notes she is taking the Tylenol #3 up to three times a day as her pain warrants, and she mentions having worsening pain. Her husbands notes she has been having trouble with weakness in her legs, she does have a single point walker to help her get around. Pt at this time desires to continue with current care. More than 30 minutes was spent with the pt and her  discussing her pain, previous medication dosages and frequencies, renal disease, therapy and current treatment plan. Pain Scale: 3/10    PCP: Moiz Ernst DO     Past Medical History:   Diagnosis Date    Anemia NEC     Aneurysm ([address]     left kidney    Arthritis     Asthma     Cardiac catheterization 02/23/2015    R dominant. LM patent. mLAD 20%. CX patent. mRCA 20%. LVEDP 8.  EF 60%. Mild AS. RA 3/1. PA 24. W 6/5.  CI 2.94. Cardiac echocardiogram 07/27/2016    EF 60-65%. No WMA. Mild LVH. Normal LV diastolic fx. Mild LAE. Mod-severe AS (mean grad 34 mmHg). AS has worsened since study of 1/20/15. Mild MR.       Cardiac nuclear imaging test 02/10/2015    No evidence of ischemia or prior infarction. Hyperdynamic LV fx.  EF >70%. No RWMA. Nondiagnostic EKG on pharm stress test.  Low risk. Carotid duplex 01/12/2016    Mild < 50% bilateral ICA stenosis. Chest tightness     exertional, in the setting of widely patent coronary arteries, possibly related to elevated left ventricular end-diastolic pressure or possibly to small-vessel disease, improved on Cardizem CD in place of Verapamil    Chronic kidney disease, stage III (moderate) (HCC)     Essential hypertension     Glaucoma     Heart murmur     Hypercholesterolemia     Hypoglycemia     Raynaud's syndrome     Renal cell carcinoma (HCC)     Stage T1a Furhman Grade 2 s/p right open partial nephrectomy 2/22/10      S/p nephrectomy 2/22/10    right    Scoliosis     Stroke ([facility] [address] 2/2015    resid los of balance        Social History     Socioeconomic History    Marital status:      Spouse name: Not on file    Number of children: Not on file    Years of education: Not on file    Highest education level: Not on file   Occupational History    Not on file   Tobacco Use    Smoking status: Never    Smokeless tobacco: Never   Substance and Sexual Activity    Alcohol use: No     Alcohol/week: 0.0 standard drinks    Drug use: No    Sexual activity: Yes     Partners: Male   Other Topics Concern    Not on file   Social History Narrative    Not on file     Social Determinants of Health     Financial Resource Strain: Not on file   Food Insecurity: Not on file   Transportation Needs: Not on file   Physical Activity: Not on file   Stress: Not on file   Social Connections: Not on file   Intimate Partner Violence: Not on file   Housing Stability: Not on file       Current Outpatient Medications   Medication Sig Dispense Refill    acetaminophen-codeine (Tylenol-Codeine #4) 300-60 mg tab Take 1 Tablet by mouth two (2) times daily as needed for Pain for up to 3 days. Max Daily Amount: 2 Tablets.  60 Tablet 2    montelukast (SINGULAIR) 10 mg tablet Take 10 mg by mouth in the morning. acetaminophen-codeine (Tylenol-Codeine #3) 300-30 mg per tablet Take 1 Tablet by mouth every eight (8) hours as needed for Pain for up to 30 days. Max Daily Amount: 3 Tablets. 75 Tablet 2    acetaminophen-codeine (TYLENOL #3) 300-30 mg per tablet Take 1 Tablet by mouth three (3) times daily as needed (TID prn chronic pain) for up to 30 days. Max Daily Amount: 3 Tablets. 75 Tablet 2    methylPREDNISolone (MEDROL DOSEPACK) 4 mg tablet Per dose pack instructions (Patient not taking: No sig reported) 1 Dose Pack 0    potassium chloride SR (K-TAB) 20 mEq tablet Take 1 Tablet by mouth two (2) times a day. polyethylene glycol (MIRALAX) 17 gram/dose powder Take 17 g by mouth daily. mirtazapine (REMERON) 15 mg tablet Take 1 Tablet by mouth daily. ipratropium (ATROVENT) 21 mcg (0.03 %) nasal spray 1 [place] by Both Nostrils route daily as needed. donepeziL (ARICEPT) 5 mg tablet Take 1 Tablet by mouth daily. cyanocobalamin (VITAMIN B12) 100 mcg tablet Take 100 mcg by mouth daily. losartan (COZAAR) 100 mg tablet Take 1 Tab by mouth daily. 90 Tab 3    furosemide (LASIX) 20 mg tablet Take 20 mg by mouth daily. amLODIPine (NORVASC) 5 mg tablet Take 5 mg by mouth daily. LINZESS 145 mcg cap capsule Take 145 mcg by mouth daily. aspirin delayed-release 81 mg tablet Take 81 mg by mouth daily. atorvastatin (LIPITOR) 80 mg tablet Take 1 Tab by mouth daily. 90 Tab 3    Cholecalciferol, Vitamin D3, 50,000 unit cap Take 1 Cap by mouth every seven (7) days. PROPYLENE GLYCOL/ (SYSTANE OP) Apply  to eye as needed. albuterol (PROVENTIL HFA, VENTOLIN HFA) 90 mcg/actuation inhaler Take 2 Puffs by inhalation as needed. cetirizine (ZYRTEC) 10 mg tablet Take 10 mg by mouth as needed.          Allergies   Allergen Reactions    Vicodin [Hydrocodone-Acetaminophen] Nausea and Vomiting         REVIEW OF SYSTEMS    Constitutional: Negative for fever, chills, or weight change. Respiratory: Negative for cough or shortness of breath. Cardiovascular: Negative for chest pain or palpitations. Gastrointestinal: Negative for acid reflux, change in bowel habits, or constipation. Genitourinary: Negative for dysuria and flank pain. Musculoskeletal: Positive for lumbar pain. Skin: Negative for rash. Neurological: Negative for headaches, dizziness, or numbness. Endo/Heme/Allergies: Negative for increased bruising. Psychiatric/Behavioral: Negative for difficulty with sleep. As per HPI    PHYSICAL EXAMINATION  Visit Vitals  Pulse 87   Temp 97 °F (36.1 °C) (Temporal)   Ht 5' 2\" (1.575 m)   Wt 116 lb (52.6 kg)   SpO2 98%   BMI 21.22 kg/m²       Constitutional: Awake, alert, and in no acute distress. Neurological: 1+ symmetrical DTRs in the upper extremities. 1+ symmetrical DTRs in the lower extremities. Sensation to light touch is intact. Negative Mariano's sign bilaterally. Skin: warm, dry, and intact. Musculoskeletal: No pain with extension, axial loading, or forward flexion. No pain with internal or external rotation of her hips. Negative straight leg raise bilaterally.       Biceps  Triceps Deltoids Wrist Ext Wrist Flex Hand Intrin   Right +4/5 +4/5 +4/5 +4/5 +4/5 +4/5   Left +4/5 +4/5 +4/5 +4/5 +4/5 +4/5      Hip Flex  Quads Hamstrings Ankle DF EHL Ankle PF   Right +4/5 +4/5 +4/5 +4/5 +4/5 +4/5   Left +4/5 +4/5 +4/5 +4/5 +4/5 +4/5     IMAGING:    Lumbar MRI from 07/26/2021 was personally reviewed with pt and her  and demonstrated:    Results from [place] encounter on 07/26/21     MRI LUMB SPINE WO CONT     Narrative  EXAM: MRI LUMB SPINE WO CONT     CLINICAL INDICATIONS/HISTORY: Renal cell cancer squamous cell cancer cyst  removed from the spine radicular complaints neurogenic claudication spinal  stenosis     COMPARISON: March 2016     Technique: Multi-sequence multiplanar T1, T2, STIR imaging with and without fat  saturation obtained centered on the lumbar spine. FINDINGS:     Alignment: Intact lordosis  Vertebral body height: Normal  Marrow signal: Bone marrow demonstrates no signal changes to suggest acute edema  and there is no signal changes to suggest metastatic disease posterior bony  elements side from arthritic changes show no destructive changes  Other: There are benign cysts in the kidneys on the right and left side given history  cannot kidney cancer however this does not compromise a complete diagnostic  study there is some distortion of the cortex of the right kidney on image 25  series 5 which may represent a previous partial nephrectomy  Disc spaces: Preserved height and signal intensity  Conus: Terminates at T12-L1     Axial imaging correlation:           L1-2: Patent canal and foramina.      L2-3: Mild midline annular bulge no central canal stenosis     Bulge projects into the L2-3 right neuroforamina exiting nerve root not  compressed     Central canal left neuroforamina unremarkable     L3-4: Mild annular bulge mild intradiscal degenerative changes Central canal  left and right neuroforamina are not stenotic exiting nerve roots are not  compressed there is moderate hypertrophic and arthritic changes in the facets     L4-5: Mild signal loss mild disc space narrowing there is anterior subluxation  of L4 on 5 measuring approximately 0.6 cm     The annulus demonstrates a mild protrusion-type bulge in the central canal  central canal is nonstenotic     Bulge projects in the left neuroforamina exiting nerve root is not compressed  bulge projects in right neuroforamina exiting nerve root is not compressed  facets demonstrate advanced arthritic and hypertrophic changes in degree of  subluxation has increased from 2016     L5-S1: Mild facet arthropathy slightly more accelerated on the right side there  is a mild signal loss in the disc space     Central canal neuroforamina unremarkable     Incidental note is made of Tarlov cysts bilaterally at the S1-S2     Impression  1. Progressive degenerative changes and subluxation of L4 on 5 secondary to  facet arthropathy marked facet arthropathy with small right-sided stenosis mild  facet arthropathy identified 5 S1  Moderate facet arthropathy L3-4     No evidence of any metastatic disease lumbar spinal column     Benign cysts in the kidneys for the right kidney probably secondary to old  surgery cannot however consider this a complete study of the kidneys to exclude  recurrent kidney cancer    Written by Macario Page, as dictated by Ashish Mckinney MD.  I, Dr. Ashish Mckinney confirm that all documentation is accurate.

## 2022-10-26 ENCOUNTER — OFFICE VISIT (OUTPATIENT)
Dept: ORTHOPEDIC SURGERY | Age: 82
End: 2022-10-26
Payer: MEDICARE

## 2022-10-26 VITALS
WEIGHT: 116 LBS | HEIGHT: 62 IN | OXYGEN SATURATION: 98 % | TEMPERATURE: 97 F | HEART RATE: 87 BPM | BODY MASS INDEX: 21.35 KG/M2

## 2022-10-26 DIAGNOSIS — Z79.891 USE OF OPIATES FOR THERAPEUTIC PURPOSES: ICD-10-CM

## 2022-10-26 DIAGNOSIS — M47.816 LUMBAR FACET ARTHROPATHY: ICD-10-CM

## 2022-10-26 DIAGNOSIS — M62.838 MUSCLE SPASM: ICD-10-CM

## 2022-10-26 DIAGNOSIS — N18.30 STAGE 3 CHRONIC KIDNEY DISEASE, UNSPECIFIED WHETHER STAGE 3A OR 3B CKD (HCC): ICD-10-CM

## 2022-10-26 DIAGNOSIS — F03.90 DEMENTIA WITHOUT BEHAVIORAL DISTURBANCE, UNSPECIFIED DEMENTIA TYPE: ICD-10-CM

## 2022-10-26 DIAGNOSIS — G89.29 OTHER CHRONIC PAIN: ICD-10-CM

## 2022-10-26 DIAGNOSIS — Z90.5 S/P NEPHRECTOMY: ICD-10-CM

## 2022-10-26 DIAGNOSIS — G89.29 OTHER CHRONIC PAIN: Primary | ICD-10-CM

## 2022-10-26 PROCEDURE — G8536 NO DOC ELDER MAL SCRN: HCPCS | Performed by: PHYSICAL MEDICINE & REHABILITATION

## 2022-10-26 PROCEDURE — G8420 CALC BMI NORM PARAMETERS: HCPCS | Performed by: PHYSICAL MEDICINE & REHABILITATION

## 2022-10-26 PROCEDURE — 99214 OFFICE O/P EST MOD 30 MIN: CPT | Performed by: PHYSICAL MEDICINE & REHABILITATION

## 2022-10-26 PROCEDURE — 1123F ACP DISCUSS/DSCN MKR DOCD: CPT | Performed by: PHYSICAL MEDICINE & REHABILITATION

## 2022-10-26 PROCEDURE — 1090F PRES/ABSN URINE INCON ASSESS: CPT | Performed by: PHYSICAL MEDICINE & REHABILITATION

## 2022-10-26 PROCEDURE — G8427 DOCREV CUR MEDS BY ELIG CLIN: HCPCS | Performed by: PHYSICAL MEDICINE & REHABILITATION

## 2022-10-26 PROCEDURE — G8756 NO BP MEASURE DOC: HCPCS | Performed by: PHYSICAL MEDICINE & REHABILITATION

## 2022-10-26 PROCEDURE — G9717 DOC PT DX DEP/BP F/U NT REQ: HCPCS | Performed by: PHYSICAL MEDICINE & REHABILITATION

## 2022-10-26 PROCEDURE — 1101F PT FALLS ASSESS-DOCD LE1/YR: CPT | Performed by: PHYSICAL MEDICINE & REHABILITATION

## 2022-10-26 RX ORDER — ACETAMINOPHEN AND CODEINE PHOSPHATE 300; 60 MG/1; MG/1
1 TABLET ORAL
Qty: 60 TABLET | Refills: 2 | Status: SHIPPED | OUTPATIENT
Start: 2022-10-26 | End: 2022-10-29

## 2022-11-17 ENCOUNTER — TELEPHONE (OUTPATIENT)
Dept: CARDIOLOGY CLINIC | Age: 82
End: 2022-11-17

## 2022-11-17 NOTE — TELEPHONE ENCOUNTER
Saw message. Attempted to call pt to review with them about particulars of dentist appt but had to leave a voicemail for them to call us back.

## 2022-11-22 NOTE — TELEPHONE ENCOUNTER
Verbal order and read back per Power Bowen MD     Pt would need an antibiotic prior to dental procedure. Dentist to order of his choosing. Called pt on 17 later in day and spoke to  and he said that was  seeing a new dentist and that he asked them if she needed antibiotic before dental procedures and they said that she had never had before but  said \"maybe this dentis does thing differently\". Told him would speak to Dr Dave Wood and see what he said. On Friday, November 18, 2022    Verbal order and read back per Power Bowen MD  Pt will need an antibiotic pre dental procedure, dentist's choice of antibiotic and they will need to order it    Called pt back on the 18th, couldn't leave voicemail  Called pt 21, couldn't leave voicemail  Called today, left voicemail for them to call us back .

## 2022-12-03 ENCOUNTER — APPOINTMENT (OUTPATIENT)
Dept: GENERAL RADIOLOGY | Age: 82
End: 2022-12-03
Attending: EMERGENCY MEDICINE
Payer: MEDICARE

## 2022-12-03 ENCOUNTER — HOSPITAL ENCOUNTER (EMERGENCY)
Age: 82
Discharge: HOME | End: 2022-12-03
Attending: EMERGENCY MEDICINE
Payer: MEDICARE

## 2022-12-03 VITALS
BODY MASS INDEX: 22.08 KG/M2 | RESPIRATION RATE: 13 BRPM | SYSTOLIC BLOOD PRESSURE: 172 MMHG | HEART RATE: 89 BPM | WEIGHT: 120 LBS | OXYGEN SATURATION: 100 % | HEIGHT: 62 IN | DIASTOLIC BLOOD PRESSURE: 64 MMHG | TEMPERATURE: 98.3 F

## 2022-12-03 DIAGNOSIS — R00.2 PALPITATIONS: ICD-10-CM

## 2022-12-03 DIAGNOSIS — R07.89 CHEST PRESSURE: Primary | ICD-10-CM

## 2022-12-03 LAB
ALBUMIN SERPL-MCNC: 4.3 G/DL (ref 3.4–5)
ALBUMIN/GLOB SERPL: 1.3 {RATIO} (ref 0.8–1.7)
ALP SERPL-CCNC: 96 U/L (ref 45–117)
ALT SERPL-CCNC: 29 U/L (ref 13–56)
ANION GAP SERPL CALC-SCNC: 6 MMOL/L (ref 3–18)
APPEARANCE UR: CLEAR
AST SERPL-CCNC: 26 U/L (ref 10–38)
BASOPHILS # BLD: 0.1 K/UL (ref 0–0.1)
BASOPHILS NFR BLD: 1 % (ref 0–2)
BILIRUB SERPL-MCNC: 0.8 MG/DL (ref 0.2–1)
BILIRUB UR QL: NEGATIVE
BNP SERPL-MCNC: 812 PG/ML (ref 0–1800)
BUN SERPL-MCNC: 13 MG/DL (ref 7–18)
BUN/CREAT SERPL: 13 (ref 12–20)
CALCIUM SERPL-MCNC: 9.5 MG/DL (ref 8.5–10.1)
CHLORIDE SERPL-SCNC: 95 MMOL/L (ref 100–111)
CO2 SERPL-SCNC: 27 MMOL/L (ref 21–32)
COLOR UR: YELLOW
CREAT SERPL-MCNC: 1 MG/DL (ref 0.6–1.3)
D DIMER PPP FEU-MCNC: 0.47 UG/ML(FEU)
DIFFERENTIAL METHOD BLD: ABNORMAL
EOSINOPHIL # BLD: 0 K/UL (ref 0–0.4)
EOSINOPHIL NFR BLD: 0 % (ref 0–5)
ERYTHROCYTE [DISTWIDTH] IN BLOOD BY AUTOMATED COUNT: 13.3 % (ref 11.6–14.5)
GLOBULIN SER CALC-MCNC: 3.2 G/DL (ref 2–4)
GLUCOSE SERPL-MCNC: 240 MG/DL (ref 74–99)
GLUCOSE UR STRIP.AUTO-MCNC: 250 MG/DL
HCT VFR BLD AUTO: 35.6 % (ref 35–45)
HGB BLD-MCNC: 12.3 G/DL (ref 12–16)
HGB UR QL STRIP: NEGATIVE
IMM GRANULOCYTES # BLD AUTO: 0 K/UL (ref 0–0.04)
IMM GRANULOCYTES NFR BLD AUTO: 0 % (ref 0–0.5)
KETONES UR QL STRIP.AUTO: NEGATIVE MG/DL
LEUKOCYTE ESTERASE UR QL STRIP.AUTO: NEGATIVE
LYMPHOCYTES # BLD: 0.8 K/UL (ref 0.9–3.6)
LYMPHOCYTES NFR BLD: 10 % (ref 21–52)
MAGNESIUM SERPL-MCNC: 2 MG/DL (ref 1.6–2.6)
MCH RBC QN AUTO: 32 PG (ref 24–34)
MCHC RBC AUTO-ENTMCNC: 34.6 G/DL (ref 31–37)
MCV RBC AUTO: 92.7 FL (ref 78–100)
MONOCYTES # BLD: 0.4 K/UL (ref 0.05–1.2)
MONOCYTES NFR BLD: 5 % (ref 3–10)
NEUTS SEG # BLD: 6.7 K/UL (ref 1.8–8)
NEUTS SEG NFR BLD: 83 % (ref 40–73)
NITRITE UR QL STRIP.AUTO: NEGATIVE
NRBC # BLD: 0 K/UL (ref 0–0.01)
NRBC BLD-RTO: 0 PER 100 WBC
PH UR STRIP: 6 [PH] (ref 5–8)
PLATELET # BLD AUTO: 223 K/UL (ref 135–420)
PMV BLD AUTO: 9.6 FL (ref 9.2–11.8)
POTASSIUM SERPL-SCNC: 3.8 MMOL/L (ref 3.5–5.5)
PROT SERPL-MCNC: 7.5 G/DL (ref 6.4–8.2)
PROT UR STRIP-MCNC: NEGATIVE MG/DL
RBC # BLD AUTO: 3.84 M/UL (ref 4.2–5.3)
SODIUM SERPL-SCNC: 128 MMOL/L (ref 136–145)
SP GR UR REFRACTOMETRY: 1.01 (ref 1–1.03)
TROPONIN-HIGH SENSITIVITY: 15 NG/L (ref 0–54)
TROPONIN-HIGH SENSITIVITY: 18 NG/L (ref 0–54)
UROBILINOGEN UR QL STRIP.AUTO: 0.2 EU/DL (ref 0.2–1)
WBC # BLD AUTO: 8.1 K/UL (ref 4.6–13.2)

## 2022-12-03 PROCEDURE — 85379 FIBRIN DEGRADATION QUANT: CPT

## 2022-12-03 PROCEDURE — 74011250636 HC RX REV CODE- 250/636: Performed by: EMERGENCY MEDICINE

## 2022-12-03 PROCEDURE — 71045 X-RAY EXAM CHEST 1 VIEW: CPT

## 2022-12-03 PROCEDURE — 94762 N-INVAS EAR/PLS OXIMTRY CONT: CPT

## 2022-12-03 PROCEDURE — 99285 EMERGENCY DEPT VISIT HI MDM: CPT

## 2022-12-03 PROCEDURE — 81003 URINALYSIS AUTO W/O SCOPE: CPT

## 2022-12-03 PROCEDURE — 80053 COMPREHEN METABOLIC PANEL: CPT

## 2022-12-03 PROCEDURE — 96374 THER/PROPH/DIAG INJ IV PUSH: CPT

## 2022-12-03 PROCEDURE — 83880 ASSAY OF NATRIURETIC PEPTIDE: CPT

## 2022-12-03 PROCEDURE — 84484 ASSAY OF TROPONIN QUANT: CPT

## 2022-12-03 PROCEDURE — 93005 ELECTROCARDIOGRAM TRACING: CPT

## 2022-12-03 PROCEDURE — 85025 COMPLETE CBC W/AUTO DIFF WBC: CPT

## 2022-12-03 PROCEDURE — 83735 ASSAY OF MAGNESIUM: CPT

## 2022-12-03 RX ORDER — MORPHINE SULFATE 4 MG/ML
4 INJECTION INTRAVENOUS
Status: COMPLETED | OUTPATIENT
Start: 2022-12-03 | End: 2022-12-03

## 2022-12-03 RX ADMIN — MORPHINE SULFATE 4 MG: 4 INJECTION, SOLUTION INTRAMUSCULAR; INTRAVENOUS at 16:47

## 2022-12-03 NOTE — ED NOTES
Patient arrives to ED with report of chest pain, midsternal. Patient appears comfortable, states she \"just wants medicine\" so she can go home.

## 2022-12-03 NOTE — ED PROVIDER NOTES
The patient is an 26-year-old woman with a past medical history as listed below, who states that she does not have any heart problems but did have a cardiac cath in 2015, who presents to the ED today with chest pain. She states that she has had intermittent chest pain in the past but it hasn't been persistent. She is having chest pressure and feels her heartbeat in her throat. She states that since that she has a history of asthma, it is difficult for her to tell what it is due to asthma and what is due to her heart. She states that she has not had a prior MI and does not have a history of CHF. She does have a history of heart palpitations. She is also complaining of some shortness of breath. She denies cough or fever. She does feel weak. Past Medical History:   Diagnosis Date    Anemia NEC     Aneurysm ([address]     left kidney    Arthritis     Asthma     Cardiac catheterization 02/23/2015    R dominant. LM patent. mLAD 20%. CX patent. mRCA 20%. LVEDP 8.  EF 60%. Mild AS. RA 3/1. PA 24. W 6/5.  CI 2.94.    Cardiac echocardiogram 07/27/2016    EF 60-65%. No WMA. Mild LVH. Normal LV diastolic fx. Mild LAE. Mod-severe AS (mean grad 34 mmHg). AS has worsened since study of 1/20/15. Mild MR.  Cardiac nuclear imaging test 02/10/2015    No evidence of ischemia or prior infarction. Hyperdynamic LV fx.  EF >70%. No RWMA. Nondiagnostic EKG on pharm stress test.  Low risk.  Carotid duplex 01/12/2016    Mild < 50% bilateral ICA stenosis.       Chest tightness     exertional, in the setting of widely patent coronary arteries, possibly related to elevated left ventricular end-diastolic pressure or possibly to small-vessel disease, improved on Cardizem CD in place of Verapamil    [facility], stage III (moderate) ([facility])     Essential hypertension     Glaucoma     Heart murmur     Hypercholesterolemia     Hypoglycemia     Raynaud's syndrome     Renal cell carcinoma ([facility]     Stage T1a Furhman Grade 2 s/p right open partial nephrectomy 2/22/10      S/p nephrectomy 2/22/10    right    Scoliosis     Stroke [facility]) 2/2015    resid los of balance       Past Surgical History:   Procedure Laterality Date    COLONOSCOPY N/A 10/10/2016    COLONOSCOPY performed [facility] at [facility] ENDOSCOPY    HX AORTIC VALVE REPLACEMENT Bilateral 03/2012018    not allowed to have MRI    HX AORTIC VALVE REPLACEMENT      HX CYST REMOVAL      on spine    HX GYN      partial hysterectomy 1988, on Premarin until a few years ago    HX HEART CATHETERIZATION  3/18/09    Low left-sided and right-sided filling pressures (likely related to overnight fasting). Normal resting pulmonary  arterial pressure. Normal systemic pressures. Borderline left ventricular end-diastolic pressure at rest. Mild incerease in pulmonary artery pressure and pulmonary capillary wedge pressure with exercise. Mild, hemodynamically nonsignificant epicardial coronary artery disease.     HX NEPHRECTOMY  2/22/10    s/p partial right nephrectomy for right kidney lower pole mass secondary to early stage cancer    HX OTHER SURGICAL      Ovary removal    HX TRABECULECTOMY      VASCULAR SURGERY PROCEDURE UNLIST  03/20/2018    artificial Heart Valve (Dr. Gisselle Solorio with [facility])         Family History:   Problem Relation Age of Onset    Thyroid Disease Mother     COPD Mother     Cancer Father     Heart Disease Brother        Social History     Socioeconomic History    Marital status:      Spouse name: Not on file    Number of children: Not on file    Years of education: Not on file    Highest education level: Not on file   Occupational History    Not on file   Tobacco Use    Smoking status: Never    Smokeless tobacco: Never   Substance and Sexual Activity    Alcohol use: No     Alcohol/week: 0.0 standard drinks    Drug use: No    Sexual activity: Yes     Partners: Male   Other Topics Concern    Not on file   Social History Narrative    Not on file     Social Determinants of Health     Financial Resource Strain: Not on file   Food Insecurity: Not on file   Transportation Needs: Not on file   Physical Activity: Not on file   Stress: Not on file   Social Connections: Not on file   Intimate Partner Violence: Not on file   Housing Stability: Not on file         ALLERGIES: Vicodin [hydrocodone-acetaminophen]    Review of Systems   All other systems reviewed and are negative. Vitals:    12/03/22 1600 12/03/22 1611   BP:  (!) 154/62   Pulse:  82   Resp:  17   Temp:  98.3 °F (36.8 °C)   SpO2:  100%   Weight: 54.4 kg (120 lb)    Height: 5' 2\" (1.575 m)             Physical Exam  Vitals and nursing note reviewed. Constitutional:       Appearance: She is well-developed. HENT:      Head: Normocephalic and atraumatic. Eyes:      Extraocular Movements: Extraocular movements intact. Pupils: Pupils are equal, round, and reactive to light. Cardiovascular:      Rate and Rhythm: Tachycardia present. Rhythm irregular. Heart sounds: Murmur heard. Pulmonary:      Effort: Pulmonary effort is normal.      Breath sounds: Normal breath sounds. Abdominal:      General: Bowel sounds are normal.      Palpations: Abdomen is soft. Musculoskeletal:      Cervical back: Normal range of motion and neck supple. Right lower leg: No tenderness. No edema. Left lower leg: No tenderness. No edema. Skin:     General: Skin is warm and dry. Capillary Refill: Capillary refill takes 2 to 3 seconds. Neurological:      General: No focal deficit present. Mental Status: She is alert and oriented to person, place, and time.    Psychiatric:      Comments: Some short-term memory loss      Recent Results (from the past 12 hour(s))   CBC WITH AUTOMATED DIFF    Collection Time: 12/03/22  4:11 PM   Result Value Ref Range    WBC 8.1 4.6 - 13.2 K/uL    RBC 3.84 (L) 4.20 - 5.30 M/uL    HGB 12.3 12.0 - 16.0 g/dL    HCT 35.6 35.0 - 45.0 %    MCV 92.7 78.0 - 100.0 FL    MCH 32.0 24.0 - 34.0 PG    MCHC 34.6 31.0 - 37.0 g/dL    RDW 13.3 11.6 - 14.5 %    PLATELET 736 148 - 163 K/uL    MPV 9.6 9.2 - 11.8 FL    NRBC 0.0 0  WBC    ABSOLUTE NRBC 0.00 0.00 - 0.01 K/uL    NEUTROPHILS 83 (H) 40 - 73 %    LYMPHOCYTES 10 (L) 21 - 52 %    MONOCYTES 5 3 - 10 %    EOSINOPHILS 0 0 - 5 %    BASOPHILS 1 0 - 2 %    IMMATURE GRANULOCYTES 0 0.0 - 0.5 %    ABS. NEUTROPHILS 6.7 1.8 - 8.0 K/UL    ABS. LYMPHOCYTES 0.8 (L) 0.9 - 3.6 K/UL    ABS. MONOCYTES 0.4 0.05 - 1.2 K/UL    ABS. EOSINOPHILS 0.0 0.0 - 0.4 K/UL    ABS. BASOPHILS 0.1 0.0 - 0.1 K/UL    ABS. IMM. GRANS. 0.0 0.00 - 0.04 K/UL    DF AUTOMATED     TROPONIN-HIGH SENSITIVITY    Collection Time: 12/03/22  4:11 PM   Result Value Ref Range    Troponin-High Sensitivity 15 0 - 54 ng/L   METABOLIC PANEL, COMPREHENSIVE    Collection Time: 12/03/22  4:11 PM   Result Value Ref Range    Sodium 128 (L) 136 - 145 mmol/L    Potassium 3.8 3.5 - 5.5 mmol/L    Chloride 95 (L) 100 - 111 mmol/L    CO2 27 21 - 32 mmol/L    Anion gap 6 3.0 - 18 mmol/L    Glucose 240 (H) 74 - 99 mg/dL    BUN 13 7.0 - 18 MG/DL    Creatinine 1.00 0.6 - 1.3 MG/DL    BUN/Creatinine ratio 13 12 - 20      eGFR 57 (L) >60 ml/min/1.73m2    Calcium 9.5 8.5 - 10.1 MG/DL    Bilirubin, total 0.8 0.2 - 1.0 MG/DL    ALT (SGPT) 29 13 - 56 U/L    AST (SGOT) 26 10 - 38 U/L    Alk.  phosphatase 96 45 - 117 U/L    Protein, total 7.5 6.4 - 8.2 g/dL    Albumin 4.3 3.4 - 5.0 g/dL    Globulin 3.2 2.0 - 4.0 g/dL    A-G Ratio 1.3 0.8 - 1.7     NT-PRO BNP    Collection Time: 12/03/22  4:11 PM   Result Value Ref Range    NT pro- 0 - 1,800 PG/ML   D DIMER    Collection Time: 12/03/22  4:11 PM   Result Value Ref Range    D DIMER 0.47 (H) <0.46 ug/ml(FEU)   MAGNESIUM    Collection Time: 12/03/22  4:11 PM   Result Value Ref Range    Magnesium 2.0 1.6 - 2.6 mg/dL   URINALYSIS W/ RFLX MICROSCOPIC    Collection Time: 12/03/22  5:00 PM   Result Value Ref Range Color YELLOW      Appearance CLEAR      Specific gravity 1.011 1.005 - 1.030      pH (UA) 6.0 5.0 - 8.0      Protein Negative NEG mg/dL    Glucose 250 (A) NEG mg/dL    Ketone Negative NEG mg/dL    Bilirubin Negative NEG      Blood Negative NEG      Urobilinogen 0.2 0.2 - 1.0 EU/dL    Nitrites Negative NEG      Leukocyte Esterase Negative NEG     TROPONIN-HIGH SENSITIVITY    Collection Time: 12/03/22  5:56 PM   Result Value Ref Range    Troponin-High Sensitivity 18 0 - 54 ng/L     XR CHEST SNGL V    (Results Pending)     CXR: No acute cardiopulmonary abnormalities      MDM  Number of Diagnoses or Management Options  Diagnosis management comments: The patient is an 51-year-old female with past medical history significant for asthma, who presents to the ED today with chest pain and heart palpitations. Initially she was very tachycardic when she arrived. I do not see any signs of sepsis and her D-dimer is not elevated. Her heart rate has now decreased to 84. Her EKG showed ST depression inferior laterally but that is unchanged from her prior EKGs. Her first and second troponin are negative. Her heart palpitations and her chest pressure have subsided. She is requesting to go home. She will be discharged home and will be advised to follow-up with her primary care physician in 2 to 3 days. Return precautions have been given.              Procedures

## 2022-12-03 NOTE — ED TRIAGE NOTES
Pt. Arrives to the ED endorsing midsternal chest pain. Pt. Reports hx of asthma. No acute distress in triage.

## 2022-12-04 LAB
ATRIAL RATE: 111 BPM
CALCULATED P AXIS, ECG09: 44 DEGREES
CALCULATED R AXIS, ECG10: -12 DEGREES
CALCULATED T AXIS, ECG11: 97 DEGREES
DIAGNOSIS, 93000: NORMAL
P-R INTERVAL, ECG05: 132 MS
Q-T INTERVAL, ECG07: 346 MS
QRS DURATION, ECG06: 82 MS
QTC CALCULATION (BEZET), ECG08: 470 MS
VENTRICULAR RATE, ECG03: 111 BPM

## 2023-03-29 ENCOUNTER — TELEMEDICINE (OUTPATIENT)
Age: 83
End: 2023-03-29
Payer: COMMERCIAL

## 2023-03-29 DIAGNOSIS — Z79.891 LONG TERM (CURRENT) USE OF OPIATE ANALGESIC: ICD-10-CM

## 2023-03-29 DIAGNOSIS — G89.29 OTHER CHRONIC PAIN: Primary | ICD-10-CM

## 2023-03-29 DIAGNOSIS — N18.30 STAGE 3 CHRONIC KIDNEY DISEASE, UNSPECIFIED WHETHER STAGE 3A OR 3B CKD (HCC): ICD-10-CM

## 2023-03-29 DIAGNOSIS — M62.838 OTHER MUSCLE SPASM: ICD-10-CM

## 2023-03-29 DIAGNOSIS — M47.816 SPONDYLOSIS WITHOUT MYELOPATHY OR RADICULOPATHY, LUMBAR REGION: ICD-10-CM

## 2023-03-29 PROCEDURE — 99442 PR PHYS/QHP TELEPHONE EVALUATION 11-20 MIN: CPT | Performed by: PHYSICAL MEDICINE & REHABILITATION

## 2023-03-29 RX ORDER — PYRAZINAMIDE 500 MG/1
1 TABLET ORAL DAILY PRN
Qty: 75 TABLET | Refills: 2 | Status: SHIPPED | OUTPATIENT
Start: 2023-03-29 | End: 2023-11-09

## 2023-03-29 ASSESSMENT — PATIENT HEALTH QUESTIONNAIRE - PHQ9
SUM OF ALL RESPONSES TO PHQ QUESTIONS 1-9: 0
SUM OF ALL RESPONSES TO PHQ9 QUESTIONS 1 & 2: 0
1. LITTLE INTEREST OR PLEASURE IN DOING THINGS: 0
2. FEELING DOWN, DEPRESSED OR HOPELESS: 0
SUM OF ALL RESPONSES TO PHQ QUESTIONS 1-9: 0

## 2023-03-29 NOTE — PROGRESS NOTES
[facility]  [address] [place], [facility] [address]  Phone: [phone]  Fax: [phone]    Pt's YOB: 1940    Omar Arnett was seen today for back pain. Diagnoses and all orders for this visit:    Other chronic pain  -     acetaminophen-codeine (TYLENOL #3) 300-30 MG per tablet; Take 1 tablet by mouth daily as needed for Pain (BID-TID) for up to 225 days. Max Daily Amount: 3 tablets    Long term (current) use of opiate analgesic  -     acetaminophen-codeine (TYLENOL #3) 300-30 MG per tablet; Take 1 tablet by mouth daily as needed for Pain (BID-TID) for up to 225 days. Max Daily Amount: 3 tablets    Spondylosis without myelopathy or radiculopathy, lumbar region    Stage 3 chronic kidney disease, unspecified whether stage 3a or 3b CKD (HCC)    Other muscle spasm       IMPRESSION AND PLAN:  Delores Wilkerson is a 82 y.o. female, evaluated via audio-only technology on 3/29/2023 with history of chronic lumbar pain and presents to the office today for medication follow up, accompanied by her . Pt continues to complain of lumbar pain but notes her pain is well managed at this time. She is taking Tylenol #3 1 tab BID-TID prn for severe pain with benefit. 1) Pt is not a candidate for NSAID's due to renal disease. 2) She received refills of Tylenol #3  mg (MME is 9-13.5 and her  administers her medication and keeps it secure). 3) Ms. Geeta Zhu has a reminder for a \"due or due soon\" health maintenance. I have asked that she contact her primary care provider, Jatinder Schaffer DO, for follow-up on this health maintenance. 4)  demonstrated consistency with prescribing. 5) Last UDS from 10/26/2023 was consistent. Return in about 4 weeks (around 4/26/2023) for Medication follow up.     Patient-Reported Vitals  No data recorded      Delores Wilkerson was evaluated through a patient-initiated, synchronous (real-time) audio only

## 2023-05-09 ENCOUNTER — HOSPITAL ENCOUNTER (EMERGENCY)
Facility: HOSPITAL | Age: 83
Discharge: HOME | End: 2023-05-09
Attending: EMERGENCY MEDICINE
Payer: MEDICARE

## 2023-05-09 ENCOUNTER — APPOINTMENT (OUTPATIENT)
Facility: HOSPITAL | Age: 83
End: 2023-05-09
Payer: MEDICARE

## 2023-05-09 VITALS
DIASTOLIC BLOOD PRESSURE: 84 MMHG | RESPIRATION RATE: 15 BRPM | SYSTOLIC BLOOD PRESSURE: 166 MMHG | OXYGEN SATURATION: 99 % | HEART RATE: 74 BPM | TEMPERATURE: 97.9 F

## 2023-05-09 DIAGNOSIS — S09.90XA CLOSED HEAD INJURY, INITIAL ENCOUNTER: Primary | ICD-10-CM

## 2023-05-09 DIAGNOSIS — T14.8XXA HEMATOMA AND CONTUSION: ICD-10-CM

## 2023-05-09 LAB
ALBUMIN SERPL-MCNC: 3.4 G/DL (ref 3.4–5)
ALBUMIN/GLOB SERPL: 1 (ref 0.8–1.7)
ALP SERPL-CCNC: 93 U/L (ref 45–117)
ALT SERPL-CCNC: 25 U/L (ref 13–56)
ANION GAP SERPL CALC-SCNC: 3 MMOL/L (ref 3–18)
AST SERPL-CCNC: 26 U/L (ref 10–38)
BASOPHILS # BLD: 0 K/UL (ref 0–0.1)
BASOPHILS NFR BLD: 1 % (ref 0–2)
BILIRUB SERPL-MCNC: 0.6 MG/DL (ref 0.2–1)
BUN SERPL-MCNC: 14 MG/DL (ref 7–18)
BUN/CREAT SERPL: 15 (ref 12–20)
CALCIUM SERPL-MCNC: 9 MG/DL (ref 8.5–10.1)
CHLORIDE SERPL-SCNC: 106 MMOL/L (ref 100–111)
CO2 SERPL-SCNC: 28 MMOL/L (ref 21–32)
CREAT SERPL-MCNC: 0.96 MG/DL (ref 0.6–1.3)
DIFFERENTIAL METHOD BLD: ABNORMAL
EOSINOPHIL # BLD: 0 K/UL (ref 0–0.4)
EOSINOPHIL NFR BLD: 0 % (ref 0–5)
ERYTHROCYTE [DISTWIDTH] IN BLOOD BY AUTOMATED COUNT: 13.3 % (ref 11.6–14.5)
GLOBULIN SER CALC-MCNC: 3.3 G/DL (ref 2–4)
GLUCOSE SERPL-MCNC: 240 MG/DL (ref 74–99)
HCT VFR BLD AUTO: 31.8 % (ref 35–45)
HGB BLD-MCNC: 10.8 G/DL (ref 12–16)
IMM GRANULOCYTES # BLD AUTO: 0 K/UL (ref 0–0.04)
IMM GRANULOCYTES NFR BLD AUTO: 1 % (ref 0–0.5)
INR PPP: 1 (ref 0.8–1.2)
LYMPHOCYTES # BLD: 0.5 K/UL (ref 0.9–3.6)
LYMPHOCYTES NFR BLD: 8 % (ref 21–52)
MCH RBC QN AUTO: 32 PG (ref 24–34)
MCHC RBC AUTO-ENTMCNC: 34 G/DL (ref 31–37)
MCV RBC AUTO: 94.1 FL (ref 78–100)
MONOCYTES # BLD: 0.3 K/UL (ref 0.05–1.2)
MONOCYTES NFR BLD: 4 % (ref 3–10)
NEUTS SEG # BLD: 5.4 K/UL (ref 1.8–8)
NEUTS SEG NFR BLD: 86 % (ref 40–73)
NRBC # BLD: 0 K/UL (ref 0–0.01)
NRBC BLD-RTO: 0 PER 100 WBC
PLATELET # BLD AUTO: 171 K/UL (ref 135–420)
PMV BLD AUTO: 10.2 FL (ref 9.2–11.8)
POTASSIUM SERPL-SCNC: 3.7 MMOL/L (ref 3.5–5.5)
PROT SERPL-MCNC: 6.7 G/DL (ref 6.4–8.2)
PROTHROMBIN TIME: 13.8 SEC (ref 11.5–15.2)
RBC # BLD AUTO: 3.38 M/UL (ref 4.2–5.3)
SODIUM SERPL-SCNC: 137 MMOL/L (ref 136–145)
WBC # BLD AUTO: 6.3 K/UL (ref 4.6–13.2)

## 2023-05-09 PROCEDURE — 99284 EMERGENCY DEPT VISIT MOD MDM: CPT

## 2023-05-09 PROCEDURE — 85610 PROTHROMBIN TIME: CPT

## 2023-05-09 PROCEDURE — 70450 CT HEAD/BRAIN W/O DYE: CPT

## 2023-05-09 PROCEDURE — 70486 CT MAXILLOFACIAL W/O DYE: CPT

## 2023-05-09 PROCEDURE — 6360000002 HC RX W HCPCS: Performed by: EMERGENCY MEDICINE

## 2023-05-09 PROCEDURE — 80053 COMPREHEN METABOLIC PANEL: CPT

## 2023-05-09 PROCEDURE — 85025 COMPLETE CBC W/AUTO DIFF WBC: CPT

## 2023-05-09 PROCEDURE — 96376 TX/PRO/DX INJ SAME DRUG ADON: CPT

## 2023-05-09 PROCEDURE — 96374 THER/PROPH/DIAG INJ IV PUSH: CPT

## 2023-05-09 RX ORDER — MORPHINE SULFATE 4 MG/ML
4 INJECTION, SOLUTION INTRAMUSCULAR; INTRAVENOUS
Status: COMPLETED | OUTPATIENT
Start: 2023-05-09 | End: 2023-05-09

## 2023-05-09 RX ADMIN — MORPHINE SULFATE 4 MG: 4 INJECTION INTRAVENOUS at 15:20

## 2023-05-09 RX ADMIN — MORPHINE SULFATE 4 MG: 4 INJECTION INTRAVENOUS at 13:59

## 2023-05-09 ASSESSMENT — ENCOUNTER SYMPTOMS
STRIDOR: 0
SHORTNESS OF BREATH: 0

## 2023-05-09 ASSESSMENT — PAIN DESCRIPTION - LOCATION: LOCATION: HEAD

## 2023-05-09 ASSESSMENT — PAIN SCALES - GENERAL: PAINLEVEL_OUTOF10: 10

## 2023-05-09 NOTE — ED NOTES
Assumed care of pt in room 11, came via wheelchair from triage c/o fall/    Noted with swelling at right forehead.      Alysha Ambrose, RN  05/09/23 [id number]

## 2023-05-09 NOTE — ED PROVIDER NOTES
EMERGENCY DEPARTMENT HISTORY AND PHYSICAL EXAM      Date: 5/9/2023  Patient Name: Lesli Le    History of Presenting Illness     Chief Complaint   Patient presents with    Fall     Patient  states she is on blood thinners, but unknown which one. This is an 82-year-old female who presents to the emerged department after ground-level fall that occurred just prior to arrival.  She states that she was in a parking lot and tripped over a curb and struck her face on the concrete sidewalk. Her  states that he was not there when she fell but when he came out she was somewhat altered and took about 5 minutes before she was back to normal.  He states that he thinks that she is on a blood thinning medication, not sure which one. The patient states that she has a mild headache, denies any visual symptoms, states that she does not have any nausea or vomiting. PCP: Rod Hodgkins, DO    No current facility-administered medications for this encounter. Current Outpatient Medications   Medication Sig Dispense Refill    acetaminophen-codeine (TYLENOL #3) 300-30 MG per tablet Take 1 tablet by mouth daily as needed for Pain (BID-TID) for up to 225 days. Max Daily Amount: 3 tablets 75 tablet 2    acetaminophen-codeine (TYLENOL #4) 300-60 MG per tablet Take 1 tablet by mouth 2 times daily as needed.       albuterol sulfate HFA (PROVENTIL;VENTOLIN;PROAIR) 108 (90 Base) MCG/ACT inhaler Inhale 2 puffs into the lungs as needed      amLODIPine (NORVASC) 5 MG tablet Take 5 mg by mouth daily      aspirin 81 MG EC tablet Take 81 mg by mouth daily      atorvastatin (LIPITOR) 80 MG tablet Take 80 mg by mouth daily      cetirizine (ZYRTEC) 10 MG tablet Take 10 mg by mouth as needed      vitamin D (CHOLECALCIFEROL) 33886 UNIT CAPS Take 1 capsule by mouth every 7 days      cyanocobalamin 100 MCG tablet Take 100 mcg by mouth daily      donepezil (ARICEPT) 5 MG tablet Take 1 tablet by mouth daily      furosemide (LASIX)

## 2023-05-09 NOTE — ED NOTES
Pt is seen and discharged by provider. IV removed. Discharge papers with instructions given to . Transported to the exit via wheelchair.      Fe Sanchez RN  05/09/23 [id number]

## 2023-05-09 NOTE — ED TRIAGE NOTES
Pt. Arrives to the ED with  after she fell and hit her head outside with possible LOC. Pt. Is currently on blood thinners.

## 2023-05-09 NOTE — ED NOTES
Taken to CT by transport. 1500 Back from CT. Placed back on monitor.        Charu Guan RN  05/09/23 [id number]

## 2023-05-26 DIAGNOSIS — G89.29 OTHER CHRONIC PAIN: ICD-10-CM

## 2023-05-26 DIAGNOSIS — Z79.891 LONG TERM (CURRENT) USE OF OPIATE ANALGESIC: ICD-10-CM

## 2023-05-30 RX ORDER — PYRAZINAMIDE 500 MG/1
1 TABLET ORAL DAILY PRN
Qty: 75 TABLET | Refills: 2 | OUTPATIENT
Start: 2023-05-30 | End: 2024-01-10

## 2023-08-28 DIAGNOSIS — Z79.891 LONG TERM (CURRENT) USE OF OPIATE ANALGESIC: ICD-10-CM

## 2023-08-28 DIAGNOSIS — G89.29 OTHER CHRONIC PAIN: ICD-10-CM

## 2023-08-29 ENCOUNTER — TELEMEDICINE (OUTPATIENT)
Age: 83
End: 2023-08-29
Payer: MEDICARE

## 2023-08-29 DIAGNOSIS — G89.29 OTHER CHRONIC PAIN: Primary | ICD-10-CM

## 2023-08-29 DIAGNOSIS — M47.816 SPONDYLOSIS WITHOUT MYELOPATHY OR RADICULOPATHY, LUMBAR REGION: ICD-10-CM

## 2023-08-29 DIAGNOSIS — N18.30 STAGE 3 CHRONIC KIDNEY DISEASE, UNSPECIFIED WHETHER STAGE 3A OR 3B CKD (HCC): ICD-10-CM

## 2023-08-29 DIAGNOSIS — Z79.891 LONG TERM (CURRENT) USE OF OPIATE ANALGESIC: ICD-10-CM

## 2023-08-29 DIAGNOSIS — M62.838 OTHER MUSCLE SPASM: ICD-10-CM

## 2023-08-29 DIAGNOSIS — M12.9 ARTHROPATHY: ICD-10-CM

## 2023-08-29 DIAGNOSIS — F03.B0 MODERATE DEMENTIA WITHOUT BEHAVIORAL DISTURBANCE, PSYCHOTIC DISTURBANCE, MOOD DISTURBANCE, OR ANXIETY, UNSPECIFIED DEMENTIA TYPE (HCC): ICD-10-CM

## 2023-08-29 PROBLEM — M62.84 SARCOPENIA: Status: ACTIVE | Noted: 2023-04-28

## 2023-08-29 PROBLEM — E78.5 HYPERLIPIDEMIA: Status: ACTIVE | Noted: 2023-08-29

## 2023-08-29 PROBLEM — T40.601A OPIATE OR RELATED NARCOTIC OVERDOSE (HCC): Status: ACTIVE | Noted: 2021-07-10

## 2023-08-29 PROCEDURE — 99441 PR PHYS/QHP TELEPHONE EVALUATION 5-10 MIN: CPT | Performed by: PHYSICAL MEDICINE & REHABILITATION

## 2023-08-29 RX ORDER — ACETAMINOPHEN AND CODEINE PHOSPHATE 300; 30 MG/1; MG/1
TABLET ORAL
Qty: 75 TABLET | OUTPATIENT
Start: 2023-08-29

## 2023-08-29 RX ORDER — ACETAMINOPHEN AND CODEINE PHOSPHATE 300; 30 MG/1; MG/1
1 TABLET ORAL EVERY 8 HOURS PRN
Qty: 90 TABLET | Refills: 2 | Status: SHIPPED | OUTPATIENT
Start: 2023-08-29 | End: 2023-11-27

## 2023-08-29 NOTE — PROGRESS NOTES
[facility]  [facility] [address] [place] [address], [address] [facility], [address] [place] [address] Dr  Phone: [phone]  Fax: [phone]        TELEPHONE VISIT    Pt's YOB: 1940    Do Temple was seen today for lower back pain. Diagnoses and all orders for this visit:    Other chronic pain    Long term (current) use of opiate analgesic    Spondylosis without myelopathy or radiculopathy, lumbar region    Stage 3 chronic kidney disease, unspecified whether stage 3a or 3b CKD (HCC)    Other muscle spasm    Moderate dementia without behavioral disturbance, psychotic disturbance, mood disturbance, or anxiety, unspecified dementia type ([address])    Arthropathy         IMPRESSION AND PLAN:  Jake Babin is a 82 y.o. female, evaluated via audio-only technology on 8/29/2023 with history of chronic lumbar pain and presents to the office today for medication follow up. Pt continues to complain of pain in the lumbar region, managed well with her current medication regimen. She is taking Tylenol #3  mg 1 tab BID-TID as needed for pain (MME is 9-13.5 and her  administers her medication and keeps it secure). 1) She is taking Tylenol #3  mg 1 tab BID-TID as needed for pain and received refills today (MME is 9-13.5 and her  administers her medication and keeps it secure). 2) Ms. Amparo Fox has a reminder for a \"due or due soon\" health maintenance. I have asked that she contact her primary care provider, Lynette Coleman DO, for follow-up on this health maintenance. 3)  demonstrated consistency with prescribing. 4) Last UDS from 10/26/2023 was consistent. 5) Pt is not a candidate for NSAID's due to renal disease. Return in about 2 months (around 10/29/2023) for Medication follow up. Patient-Reported Vitals  No data recorded      Jake Babin was evaluated through a patient-initiated, synchronous (real-time) audio only encounter.  She (or guardian if applicable) is

## 2023-08-29 NOTE — PROGRESS NOTES
Danielle Welch presents today for   Chief Complaint   Patient presents with    Lower Back Pain       Is someone accompanying this pt? Yes,     Is the patient using any DME equipment during [address]? Yes      Coordination of Care:  1. Have you been to the ER, urgent care clinic since your last visit? Yes, wrist  Hospitalized since your last visit? no    2. Have you seen or consulted any other health care providers outside of the [address] since your last visit? Include any pap smears or colon screening.  no

## 2023-09-13 ENCOUNTER — TRANSCRIBE ORDERS (OUTPATIENT)
Facility: HOSPITAL | Age: 83
End: 2023-09-13

## 2023-09-13 DIAGNOSIS — H53.462 HOMONYMOUS BILATERAL FIELD DEFECTS OF LEFT SIDE: Primary | ICD-10-CM

## 2023-10-25 ENCOUNTER — OFFICE VISIT (OUTPATIENT)
Age: 83
End: 2023-10-25
Payer: MEDICARE

## 2023-10-25 VITALS
BODY MASS INDEX: 20.24 KG/M2 | WEIGHT: 110 LBS | HEIGHT: 62 IN | OXYGEN SATURATION: 99 % | DIASTOLIC BLOOD PRESSURE: 62 MMHG | SYSTOLIC BLOOD PRESSURE: 152 MMHG | HEART RATE: 74 BPM

## 2023-10-25 DIAGNOSIS — I10 ESSENTIAL (PRIMARY) HYPERTENSION: Primary | ICD-10-CM

## 2023-10-25 DIAGNOSIS — I35.0 NONRHEUMATIC AORTIC (VALVE) STENOSIS: ICD-10-CM

## 2023-10-25 DIAGNOSIS — Z95.2 PRESENCE OF PROSTHETIC HEART VALVE: ICD-10-CM

## 2023-10-25 PROCEDURE — G8427 DOCREV CUR MEDS BY ELIG CLIN: HCPCS | Performed by: INTERNAL MEDICINE

## 2023-10-25 PROCEDURE — 1036F TOBACCO NON-USER: CPT | Performed by: INTERNAL MEDICINE

## 2023-10-25 PROCEDURE — G8484 FLU IMMUNIZE NO ADMIN: HCPCS | Performed by: INTERNAL MEDICINE

## 2023-10-25 PROCEDURE — G8420 CALC BMI NORM PARAMETERS: HCPCS | Performed by: INTERNAL MEDICINE

## 2023-10-25 PROCEDURE — 99214 OFFICE O/P EST MOD 30 MIN: CPT | Performed by: INTERNAL MEDICINE

## 2023-10-25 PROCEDURE — 93000 ELECTROCARDIOGRAM COMPLETE: CPT | Performed by: INTERNAL MEDICINE

## 2023-10-25 PROCEDURE — 1090F PRES/ABSN URINE INCON ASSESS: CPT | Performed by: INTERNAL MEDICINE

## 2023-10-25 PROCEDURE — 3078F DIAST BP <80 MM HG: CPT | Performed by: INTERNAL MEDICINE

## 2023-10-25 PROCEDURE — G8400 PT W/DXA NO RESULTS DOC: HCPCS | Performed by: INTERNAL MEDICINE

## 2023-10-25 PROCEDURE — 1123F ACP DISCUSS/DSCN MKR DOCD: CPT | Performed by: INTERNAL MEDICINE

## 2023-10-25 PROCEDURE — 3077F SYST BP >= 140 MM HG: CPT | Performed by: INTERNAL MEDICINE

## 2023-10-25 ASSESSMENT — PATIENT HEALTH QUESTIONNAIRE - PHQ9
2. FEELING DOWN, DEPRESSED OR HOPELESS: 0
9. THOUGHTS THAT YOU WOULD BE BETTER OFF DEAD, OR OF HURTING YOURSELF: 0
SUM OF ALL RESPONSES TO PHQ9 QUESTIONS 1 & 2: 0
8. MOVING OR SPEAKING SO SLOWLY THAT OTHER PEOPLE COULD HAVE NOTICED. OR THE OPPOSITE, BEING SO FIGETY OR RESTLESS THAT YOU HAVE BEEN MOVING AROUND A LOT MORE THAN USUAL: 0
10. IF YOU CHECKED OFF ANY PROBLEMS, HOW DIFFICULT HAVE THESE PROBLEMS MADE IT FOR YOU TO DO YOUR WORK, TAKE CARE OF THINGS AT HOME, OR GET ALONG WITH OTHER PEOPLE: 0
3. TROUBLE FALLING OR STAYING ASLEEP: 0
4. FEELING TIRED OR HAVING LITTLE ENERGY: 0
SUM OF ALL RESPONSES TO PHQ QUESTIONS 1-9: 0
5. POOR APPETITE OR OVEREATING: 0
7. TROUBLE CONCENTRATING ON THINGS, SUCH AS READING THE NEWSPAPER OR WATCHING TELEVISION: 0
6. FEELING BAD ABOUT YOURSELF - OR THAT YOU ARE A FAILURE OR HAVE LET YOURSELF OR YOUR FAMILY DOWN: 0
1. LITTLE INTEREST OR PLEASURE IN DOING THINGS: 0

## 2023-10-25 NOTE — PROGRESS NOTES
Ky Reynaga presents today for   Chief Complaint   Patient presents with    Cardiac Clearance     Eye surgery       Ky Reynaga preferred language for health care discussion is english/other. Is someone accompanying this pt? no    Is the patient using any DME equipment during OV? no    Depression Screening:  Depression: Not at risk (3/29/2023)    PHQ-2     PHQ-2 Score: 0        Learning Assessment:  Who is the primary learner? Patient    What is the preferred language for health care of the primary learner? ENGLISH    How does the primary learner prefer to learn new concepts? DEMONSTRATION    Answered By patient    Relationship to Learner SELF           Pt currently taking Anticoagulant therapy? no    Pt currently taking Antiplatelet therapy ? aspirin      Coordination of Care:  1. Have you been to the ER, urgent care clinic since your last visit? Hospitalized since your last visit? no    2. Have you seen or consulted any other health care providers outside of the [address] [place] [address] since your last visit? Include any pap smears or colon screening.  no

## 2023-10-25 NOTE — PROGRESS NOTES
HISTORY OF PRESENT ILLNESS  Jesika Anne  82 y.o. female     Chief Complaint   Patient presents with    Cardiac Clearance     Eye surgery       ASSESSMENT and PLAN    The primary encounter diagnosis was Essential (primary) hypertension. Diagnoses of Nonrheumatic aortic (valve) stenosis and Presence of prosthetic heart valve were also pertinent to this visit. Ms. Zak Tolbert has history of aortic stenosis. She underwent TAVR procedure in 2018 by Dr. Rachna Gallardo. Her preprocedure coronary angiography revealed widely patent coronary arteries. Subsequently, she had TAVR utilizing 26 mm Medtronic Evolute R stent graft. Her echocardiogram in July 2020 revealed normal LV function with well-functioning aortic stent graft. CAD:    She has no documented history of CAD. TAVR:   Clinically stable. BP:    Acceptable at 152/62. Rhythm:    Stable sinus rhythm at 74 bpm.  CHF:    There is no evidence of decompensated CHF noted. Weight:     Her weight today is 110 pounds. Her baseline weight is 115 pounds. Cholesterol:   Target LDL <70. Lipitor 80. Anti-platelet:   Remains on ASA. Her major issue appears to be worsening dementia. She is scheduled undergo for laser eye surgery. From cardiac standpoint, she is progress. I will see her back annually. Thank you. Orders Placed This Encounter   Procedures    EKG 12 Lead     Order Specific Question:   Reason for Exam?     Answer: Other        HPI  Today, Ms. Neel Ingram has no complaints of chest pain, shortness breath, decreased exercise capacity. She is scheduled undergo right surgery. She is accompanied by her . She still has noticeable worsening dementia. Review of Systems  14 point review of system is negative unless mentioned in HPI    Physical Exam  Vitals and nursing note reviewed. Constitutional:       Appearance: Normal appearance. HENT:      Head: Normocephalic.    Eyes:      Conjunctiva/sclera: Conjunctivae normal.   Neck:      Vascular:

## 2023-10-30 ENCOUNTER — TELEMEDICINE (OUTPATIENT)
Age: 83
End: 2023-10-30
Payer: MEDICARE

## 2023-10-30 DIAGNOSIS — G89.29 OTHER CHRONIC PAIN: ICD-10-CM

## 2023-10-30 DIAGNOSIS — N18.30 STAGE 3 CHRONIC KIDNEY DISEASE, UNSPECIFIED WHETHER STAGE 3A OR 3B CKD (HCC): ICD-10-CM

## 2023-10-30 DIAGNOSIS — M62.838 OTHER MUSCLE SPASM: ICD-10-CM

## 2023-10-30 DIAGNOSIS — Z79.891 LONG TERM (CURRENT) USE OF OPIATE ANALGESIC: ICD-10-CM

## 2023-10-30 DIAGNOSIS — M47.816 SPONDYLOSIS WITHOUT MYELOPATHY OR RADICULOPATHY, LUMBAR REGION: Primary | ICD-10-CM

## 2023-10-30 DIAGNOSIS — F03.B0 MODERATE DEMENTIA WITHOUT BEHAVIORAL DISTURBANCE, PSYCHOTIC DISTURBANCE, MOOD DISTURBANCE, OR ANXIETY, UNSPECIFIED DEMENTIA TYPE (HCC): ICD-10-CM

## 2023-10-30 PROCEDURE — 99442 PR PHYS/QHP TELEPHONE EVALUATION 11-20 MIN: CPT | Performed by: PHYSICAL MEDICINE & REHABILITATION

## 2023-10-30 RX ORDER — ACETAMINOPHEN AND CODEINE PHOSPHATE 300; 30 MG/1; MG/1
1 TABLET ORAL DAILY PRN
Qty: 90 TABLET | Refills: 2 | Status: SHIPPED | OUTPATIENT
Start: 2023-11-01 | End: 2024-06-13

## 2023-10-30 RX ORDER — PREDNISONE 10 MG/1
TABLET ORAL
Qty: 11 TABLET | Refills: 0 | Status: SHIPPED | OUTPATIENT
Start: 2023-10-30

## 2023-10-30 NOTE — PROGRESS NOTES
[facility]  [address] [place] [address], [address] [facility], [address] [place] [address] Dr  Phone: [phone]  Fax: [phone]    TELEPHONE VISIT    Pt's YOB: 1940    ASSESSMENT   Diagnoses and all orders for this visit:    Spondylosis without myelopathy or radiculopathy, lumbar region  -     predniSONE (DELTASONE) 10 MG tablet; 2 pills in am for 3 days, then 1 pill in am for 3 days and 1/2 pill in am for remainder    Other chronic pain  -     acetaminophen-codeine (TYLENOL #3) 300-30 MG per tablet; Take 1 tablet by mouth daily as needed for Pain (BID-TID) for up to 225 days. Max Daily Amount: 3 tablets    Long term (current) use of opiate analgesic  -     acetaminophen-codeine (TYLENOL #3) 300-30 MG per tablet; Take 1 tablet by mouth daily as needed for Pain (BID-TID) for up to 225 days. Max Daily Amount: 3 tablets    Other muscle spasm    Stage 3 chronic kidney disease, unspecified whether stage 3a or 3b CKD (HCC)    Moderate dementia without behavioral disturbance, psychotic disturbance, mood disturbance, or anxiety, unspecified dementia type ([address] )         IMPRESSION AND PLAN:  Nasir Jeff is a 82 y.o. female, evaluated via audio-only technology on 10/30/2023 with history of chronic lumbar pain and presents to the office today for medication follow up. Pt continues to complain of pain in the lumbar region; managed well with her current medication regimen. She is taking Tylenol #3  mg 1 tab BID-TID as needed for pain (MME is 9-13.5 and her  administers her medication and keeps it secure) and will start a small prednisone taper for inflammatory symptoms. 1) She is taking Tylenol #3  mg 1 tab BID-TID as needed for pain and received refills at this time. (MME is 9-13.5 and her  administers her medication and keeps it secure) As the weather gets colder, pt will use med 3x/day  2) Pt was prescribed a small prednisone taper for inflammatory symptoms.

## 2023-12-14 ENCOUNTER — TELEPHONE (OUTPATIENT)
Age: 83
End: 2023-12-14

## 2023-12-14 NOTE — TELEPHONE ENCOUNTER
Patients spouse is asking for a refill of Tylenol 3 to be sent to [facility] on [address]    Patient can be reached at [phone].

## 2023-12-14 NOTE — TELEPHONE ENCOUNTER
Attempted to contact the pt regarding his message. He was not able to be reached. A prescription for the tylenol #3 was sent on 11/01/23 with 2 refills. This went to the [facility] on [facility] with an electronic receipt. He will need to contact the pharmacy to initiate a refill. There was not an option for voicemail on the number listed. It only said that the caller was not available.

## 2024-01-31 ENCOUNTER — OFFICE VISIT (OUTPATIENT)
Age: 84
End: 2024-01-31
Payer: MEDICARE

## 2024-01-31 VITALS — HEIGHT: 62 IN | BODY MASS INDEX: 20.12 KG/M2 | HEART RATE: 80 BPM | OXYGEN SATURATION: 100 %

## 2024-01-31 DIAGNOSIS — N18.30 STAGE 3 CHRONIC KIDNEY DISEASE, UNSPECIFIED WHETHER STAGE 3A OR 3B CKD (HCC): ICD-10-CM

## 2024-01-31 DIAGNOSIS — M62.838 OTHER MUSCLE SPASM: ICD-10-CM

## 2024-01-31 DIAGNOSIS — F03.B0 MODERATE DEMENTIA WITHOUT BEHAVIORAL DISTURBANCE, PSYCHOTIC DISTURBANCE, MOOD DISTURBANCE, OR ANXIETY, UNSPECIFIED DEMENTIA TYPE (HCC): ICD-10-CM

## 2024-01-31 DIAGNOSIS — G89.29 OTHER CHRONIC PAIN: ICD-10-CM

## 2024-01-31 DIAGNOSIS — M47.816 SPONDYLOSIS WITHOUT MYELOPATHY OR RADICULOPATHY, LUMBAR REGION: Primary | ICD-10-CM

## 2024-01-31 DIAGNOSIS — Z79.891 LONG TERM (CURRENT) USE OF OPIATE ANALGESIC: ICD-10-CM

## 2024-01-31 PROCEDURE — G8420 CALC BMI NORM PARAMETERS: HCPCS | Performed by: PHYSICAL MEDICINE & REHABILITATION

## 2024-01-31 PROCEDURE — G8400 PT W/DXA NO RESULTS DOC: HCPCS | Performed by: PHYSICAL MEDICINE & REHABILITATION

## 2024-01-31 PROCEDURE — G8484 FLU IMMUNIZE NO ADMIN: HCPCS | Performed by: PHYSICAL MEDICINE & REHABILITATION

## 2024-01-31 PROCEDURE — 1123F ACP DISCUSS/DSCN MKR DOCD: CPT | Performed by: PHYSICAL MEDICINE & REHABILITATION

## 2024-01-31 PROCEDURE — 99214 OFFICE O/P EST MOD 30 MIN: CPT | Performed by: PHYSICAL MEDICINE & REHABILITATION

## 2024-01-31 PROCEDURE — 1090F PRES/ABSN URINE INCON ASSESS: CPT | Performed by: PHYSICAL MEDICINE & REHABILITATION

## 2024-01-31 PROCEDURE — 1036F TOBACCO NON-USER: CPT | Performed by: PHYSICAL MEDICINE & REHABILITATION

## 2024-01-31 PROCEDURE — G8427 DOCREV CUR MEDS BY ELIG CLIN: HCPCS | Performed by: PHYSICAL MEDICINE & REHABILITATION

## 2024-01-31 RX ORDER — PREDNISOLONE ACETATE 10 MG/ML
SUSPENSION/ DROPS OPHTHALMIC
COMMUNITY
Start: 2023-09-28

## 2024-01-31 RX ORDER — KETOROLAC TROMETHAMINE 5 MG/ML
SOLUTION OPHTHALMIC
COMMUNITY
Start: 2023-09-28

## 2024-01-31 RX ORDER — POTASSIUM CHLORIDE 20 MEQ/1
TABLET, EXTENDED RELEASE ORAL
COMMUNITY

## 2024-01-31 RX ORDER — ACETAMINOPHEN AND CODEINE PHOSPHATE 300; 30 MG/1; MG/1
1 TABLET ORAL EVERY 8 HOURS PRN
Qty: 90 TABLET | Refills: 2 | Status: SHIPPED | OUTPATIENT
Start: 2024-01-31 | End: 2024-09-12

## 2024-02-07 NOTE — PROGRESS NOTES
[facility]  [address] [place] [address]., [address]  [place] [address]  Phone: [phone]  Fax: [phone]    Pt's YOB: 1940    ASSESSMENT   Amena was seen today for back pain.    Diagnoses and all orders for this visit:    Spondylosis without myelopathy or radiculopathy, lumbar region  -     acetaminophen-codeine (TYLENOL #3) 300-30 MG per tablet; Take 1 tablet by mouth every 8 hours as needed for Pain (BID-TID) for up to 225 days. Max Daily Amount: 3 tablets    Other chronic pain  -     acetaminophen-codeine (TYLENOL #3) 300-30 MG per tablet; Take 1 tablet by mouth every 8 hours as needed for Pain (BID-TID) for up to 225 days. Max Daily Amount: 3 tablets  -     Drug Screen with Reflex to Quantitation (Not Interfaced)    Long term (current) use of opiate analgesic  -     acetaminophen-codeine (TYLENOL #3) 300-30 MG per tablet; Take 1 tablet by mouth every 8 hours as needed for Pain (BID-TID) for up to 225 days. Max Daily Amount: 3 tablets  -     Drug Screen with Reflex to Quantitation (Not Interfaced)    Other muscle spasm    Stage 3 chronic kidney disease, unspecified whether stage 3a or 3b CKD (HCC)    Moderate dementia without behavioral disturbance, psychotic disturbance, mood disturbance, or anxiety, unspecified dementia type ([facility])         IMPRESSION AND PLAN:  Patient is 83-year-old female with history of chronic lumbar pain.  Patient has history of dementia and her medication is managed by her .  It is well-controlled using Tylenol 3 generally 3 times per day as needed during the winter months and 1-2 times per day during the summer months.  She is currently awaiting a surgery for her eye to help correct her vision.      1) Refill for Tylenol # 3 bid - tid given ( MME is 9-13.5, her  administers her medication and keeps it secure)  2) Pt may use moist heat as needed for pain - as supervised by her   3) UDS obtained today  4) Ms. 
  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 Progressive degenerative changes and subluxation of L4 on 5 secondary to facet arthropathy marked facet arthropathy with small right-sided stenosis mild facet arthropathy identified 5 S1  Moderate facet arthropathy L3-4      No evidence of any metastatic disease lumbar spinal column      Benign cysts in the kidneys for the right kidney probably secondary to old  surgery cannot however consider this a complete study of the kidneys to exclude recurrent kidney cancer    Written by Jessica Church, as dictated by Brandi Tao MD.  I, Dr. Brandi Tao confirm that all documentation is accurate.

## 2024-04-30 ENCOUNTER — OFFICE VISIT (OUTPATIENT)
Age: 84
End: 2024-04-30
Payer: MEDICARE

## 2024-04-30 VITALS — BODY MASS INDEX: 20.12 KG/M2 | HEIGHT: 62 IN

## 2024-04-30 DIAGNOSIS — I10 ESSENTIAL (PRIMARY) HYPERTENSION: Primary | ICD-10-CM

## 2024-04-30 PROBLEM — C44.621 SQUAMOUS CELL CARCINOMA OF UPPER EXTREMITY: Status: ACTIVE | Noted: 2020-02-07

## 2024-04-30 PROBLEM — N18.2 STAGE 2 CHRONIC KIDNEY DISEASE: Status: ACTIVE | Noted: 2020-10-22

## 2024-04-30 PROBLEM — R76.8 ANTINUCLEAR FACTOR POSITIVE: Status: ACTIVE | Noted: 2021-06-10

## 2024-04-30 PROBLEM — R53.83 FATIGUE: Status: ACTIVE | Noted: 2024-04-30

## 2024-04-30 PROBLEM — K58.9 IRRITABLE BOWEL SYNDROME: Status: ACTIVE | Noted: 2024-04-30

## 2024-04-30 PROBLEM — M19.90 ARTHRITIS: Status: ACTIVE | Noted: 2024-04-30

## 2024-04-30 PROBLEM — M16.10 PRIMARY LOCALIZED OSTEOARTHRITIS OF PELVIC REGION AND THIGH: Status: ACTIVE | Noted: 2024-04-30

## 2024-04-30 PROBLEM — D53.1 MEGALOBLASTIC ANEMIA DUE TO VITAMIN B12 DEFICIENCY: Status: ACTIVE | Noted: 2024-04-30

## 2024-04-30 PROBLEM — J20.9 ACUTE BRONCHITIS: Status: ACTIVE | Noted: 2024-04-30

## 2024-04-30 PROCEDURE — 1090F PRES/ABSN URINE INCON ASSESS: CPT | Performed by: INTERNAL MEDICINE

## 2024-04-30 PROCEDURE — G8420 CALC BMI NORM PARAMETERS: HCPCS | Performed by: INTERNAL MEDICINE

## 2024-04-30 PROCEDURE — 99214 OFFICE O/P EST MOD 30 MIN: CPT | Performed by: INTERNAL MEDICINE

## 2024-04-30 PROCEDURE — G8427 DOCREV CUR MEDS BY ELIG CLIN: HCPCS | Performed by: INTERNAL MEDICINE

## 2024-04-30 PROCEDURE — 1036F TOBACCO NON-USER: CPT | Performed by: INTERNAL MEDICINE

## 2024-04-30 PROCEDURE — 1123F ACP DISCUSS/DSCN MKR DOCD: CPT | Performed by: INTERNAL MEDICINE

## 2024-04-30 PROCEDURE — G8400 PT W/DXA NO RESULTS DOC: HCPCS | Performed by: INTERNAL MEDICINE

## 2024-04-30 PROCEDURE — 93000 ELECTROCARDIOGRAM COMPLETE: CPT | Performed by: INTERNAL MEDICINE

## 2024-04-30 RX ORDER — CLOPIDOGREL BISULFATE 75 MG/1
75 TABLET ORAL DAILY
COMMUNITY
End: 2024-04-30

## 2024-04-30 ASSESSMENT — PATIENT HEALTH QUESTIONNAIRE - PHQ9
7. TROUBLE CONCENTRATING ON THINGS, SUCH AS READING THE NEWSPAPER OR WATCHING TELEVISION: NOT AT ALL
SUM OF ALL RESPONSES TO PHQ QUESTIONS 1-9: 0
SUM OF ALL RESPONSES TO PHQ QUESTIONS 1-9: 0
4. FEELING TIRED OR HAVING LITTLE ENERGY: NOT AT ALL
6. FEELING BAD ABOUT YOURSELF - OR THAT YOU ARE A FAILURE OR HAVE LET YOURSELF OR YOUR FAMILY DOWN: NOT AT ALL
1. LITTLE INTEREST OR PLEASURE IN DOING THINGS: NOT AT ALL
3. TROUBLE FALLING OR STAYING ASLEEP: NOT AT ALL
SUM OF ALL RESPONSES TO PHQ9 QUESTIONS 1 & 2: 0
SUM OF ALL RESPONSES TO PHQ QUESTIONS 1-9: 0
SUM OF ALL RESPONSES TO PHQ QUESTIONS 1-9: 0
9. THOUGHTS THAT YOU WOULD BE BETTER OFF DEAD, OR OF HURTING YOURSELF: NOT AT ALL
5. POOR APPETITE OR OVEREATING: NOT AT ALL
10. IF YOU CHECKED OFF ANY PROBLEMS, HOW DIFFICULT HAVE THESE PROBLEMS MADE IT FOR YOU TO DO YOUR WORK, TAKE CARE OF THINGS AT HOME, OR GET ALONG WITH OTHER PEOPLE: NOT DIFFICULT AT ALL
8. MOVING OR SPEAKING SO SLOWLY THAT OTHER PEOPLE COULD HAVE NOTICED. OR THE OPPOSITE, BEING SO FIGETY OR RESTLESS THAT YOU HAVE BEEN MOVING AROUND A LOT MORE THAN USUAL: NOT AT ALL
2. FEELING DOWN, DEPRESSED OR HOPELESS: NOT AT ALL

## 2024-04-30 ASSESSMENT — ANXIETY QUESTIONNAIRES
GAD7 TOTAL SCORE: 0
4. TROUBLE RELAXING: NOT AT ALL
6. BECOMING EASILY ANNOYED OR IRRITABLE: NOT AT ALL
2. NOT BEING ABLE TO STOP OR CONTROL WORRYING: NOT AT ALL
3. WORRYING TOO MUCH ABOUT DIFFERENT THINGS: NOT AT ALL
7. FEELING AFRAID AS IF SOMETHING AWFUL MIGHT HAPPEN: NOT AT ALL
5. BEING SO RESTLESS THAT IT IS HARD TO SIT STILL: NOT AT ALL
1. FEELING NERVOUS, ANXIOUS, OR ON EDGE: NOT AT ALL

## 2024-04-30 NOTE — PROGRESS NOTES
HISTORY OF PRESENT ILLNESS  Amena Smith  84 y.o. female     Chief Complaint   Patient presents with    Follow-up     6 month       ASSESSMENT and PLAN    The encounter diagnosis was Essential (primary) hypertension.    Ms. Amena Smith has history of aortic stenosis.  She underwent TAVR procedure in 2018 by Dr. Lawson.  Her preprocedure coronary angiography revealed widely patent coronary arteries.  Subsequently, she had TAVR utilizing 26 mm Medtronic Evolute R stent graft.  Her echocardiogram in July 2020 revealed normal LV function with well-functioning aortic stent graft.    CAD:    She has no documented history of CAD.  TAVR:   Clinically stable.  BP:    Well-controlled at 138/88.  Rhythm:    Sinus rhythm with dysrhythmia at 69 bpm.  CHF:    There is no evidence of decompensated CHF noted.  Weight:     Her weight today is 109 pounds.  Her baseline weight is 115 pounds.  Cholesterol:   Target LDL <70.  Lipitor 80.  Anti-platelet:   Remains on ASA.     Her major issue appears to be continued worsening dementia.   I will see her back in 6 months.  Thank you.    Orders Placed This Encounter   Procedures    EKG 12 Lead     Order Specific Question:   Reason for Exam?     Answer:   Other        HPI  Today, Ms. Smith has no complaints of chest pains, increased shortness of breath, or decreased exercise capacity.  Her biggest issue is worsening severe dementia.  According to her , she eats plenty.  However, she is unable to gain weight.  I did advise her to try to gain about 5-10 pounds.  She denies any orthopnea or PND.  She denies any palpitations or dizziness.    Review of Systems  14 point review of system is negative unless mentioned in HPI    Physical Exam  Vitals and nursing note reviewed.   Constitutional:       Appearance: She is underweight.   HENT:      Head: Normocephalic.   Eyes:      Conjunctiva/sclera: Conjunctivae normal.   Neck:      Vascular: No carotid bruit.   Cardiovascular:      Rate and

## 2024-04-30 NOTE — PROGRESS NOTES
Amena Smith presents today for   Chief Complaint   Patient presents with    Follow-up     6 month       Amena Smith preferred language for health care discussion is english/other.    Is someone accompanying this pt? yes    Is the patient using any DME equipment during OV? no    Depression Screening:  Depression: Not at risk (4/30/2024)    PHQ-2     PHQ-2 Score: 0        Learning Assessment:  Who is the primary learner? Patient    What is the preferred language for health care of the primary learner? ENGLISH    How does the primary learner prefer to learn new concepts? DEMONSTRATION    Answered By patient    Relationship to Learner SELF           Pt currently taking Anticoagulant therapy? no    Pt currently taking Antiplatelet therapy ? Aspirin 81 mg daily      Coordination of Care:  1. Have you been to the ER, urgent care clinic since your last visit? Hospitalized since your last visit? no    2. Have you seen or consulted any other health care providers outside of the [facility] since your last visit? Include any pap smears or colon screening. no

## 2024-05-06 NOTE — PROGRESS NOTES
HPI:   I saw Reg Barroso in my office today in cardiovascular evaluation regarding her aortic stenosis and hypertensive heart disease. Ms. Ivanna Kaba is a pleasant 81 year old white female with history of chest tightness on exertion and some shortness of breath on exertion in the past, which ultimately prompted a cardiac catheterization in February of 2015, even though her nuclear myocardial perfusion study prior to that appeared to be normal. Her subsequent cardiac catheterization completed on 2/23/15 by my associate Dr. Ashtyn Ramierz demonstrated only mild coronary artery disease with mild aortic stenosis with a peak systolic gradient of 21 mmHg across the valve and a valve area of 1.25 cm squared.      When we did an echocardiogram in July of 2017 and her mean gradient across aortic valve was 28 mmHg suggesting moderate aortic stenosis. This gradient was actually less than it was back in July 2016 and although I think that this was just probably underestimated does not appear as if she has had any progression of her aortic stenosis.     She went in to the hospital on 2/19/15 with left sided weakness and an MRI of the brain was negative for an acute infarct at that time, however there was evidence of chronic lacunar infarcts in the deep brain matter extending into the left lentiform to the left cauda area, and there was also increased T2 flare syndrome in the periventricular white matter consistent with mild to moderate chronic microvascular ischemic disease. She comes to the office today and appears to be doing reasonably well. She gets some palpitations and vague chest discomfort when she is anxious or working hard relieved by rest.  Is described as a heaviness and really only occurs may be a couple times a month. She does have shortness of breath with exertion which is slowly getting worse, but denies any other cardiovascular complaints. Encounter Diagnoses   Name Primary?     Aortic stenosis, moderate [FreeTextEntry1] : supportive care increase fluids to ensure no dehydration bland foods  Renovascular hypertension Yes    Dyslipidemia     Chronic kidney disease, stage III (moderate)     S/p right nephrectomy for cancer in 1/10        Discussion: This lady appears to be doing reasonably well, but she is very slowly getting a little bit more short of breath with less exertion and I do think her aortic stenosis at this juncture is at least moderately severe. I am going to repeat an echocardiogram early next month to compared to the echo we did 6 months ago and I told her that if she starts having more chest discomfort with less exertion, more shortness of breath with exertion or any exertional dizziness or syncope she should let us know since this may herald symptomatic aortic stenosis for which she may need surgery. I plan to refer her to Dr. Ernesto Agudelo for a TAVR if possible. Her latest lipid profile that have a copy of was completed well over a year ago and showed a total cholesterol in the 180 range but her HDL was over 90 with her LDLs in the 70s on Lipitor 80 mg daily which I think is reasonably good control. Her blood pressure is borderline elevated today, but I am not going to make any changes at this time and will simply plan to continue her current regimen with further recommendations pending her course and her recurrent blood pressure check when we do her echo in the next few weeks. PCP:  Luke Kamara MD       Past Medical History:   Diagnosis Date    Anemia NEC     Aneurysm ([address]     left kidney    Arthritis     Asthma     Cardiac catheterization 02/23/2015    R dominant. LM patent. mLAD 20%. CX patent. mRCA 20%. LVEDP 8.  EF 60%. Mild AS. RA 3/1. PA 24. W 6/5.  CI 2.94.    Cardiac echocardiogram 07/27/2016    EF 60-65%. No WMA. Mild LVH. Normal LV diastolic fx. Mild LAE. Mod-severe AS (mean grad 34 mmHg). AS has worsened since study of 1/20/15. Mild MR.  Cardiac nuclear imaging test 02/10/2015    No evidence of ischemia or prior infarction. Hyperdynamic LV fx.  EF >70%. No RWMA. Nondiagnostic EKG on pharm stress test.  Low risk.  Carotid duplex 01/12/2016    Mild < 50% bilateral ICA stenosis.  Chest tightness     exertional, in the setting of widely patent coronary arteries, possibly related to elevated left ventricular end-diastolic pressure or possibly to small-vessel disease, improved on Cardizem CD in place of Verapamil    Chronic kidney disease, stage III (moderate)     Essential hypertension     Glaucoma     Heart murmur     Hypercholesterolemia     Hypoglycemia     Raynaud's syndrome     Renal cell carcinoma (HCC)     Stage T1a [place] Grade 2 s/p right open partial nephrectomy 2/22/10      S/p nephrectomy 2/22/10    right    Scoliosis     Stroke [facility]) 2/2015    resid los of balance         Past Surgical History:   Procedure Laterality Date    COLONOSCOPY N/A 10/10/2016    COLONOSCOPY performed by Uriah [facility] MD at [facility] ENDOSCOPY    HX CYST REMOVAL      on spine    HX GYN      partial hysterectomy 1988, on Premarin until a few years ago    HX HEART CATHETERIZATION  3/18/09    Low left-sided and right-sided filling pressures (likely related to overnight fasting). Normal resting pulmonary  arterial pressure. Normal systemic pressures. Borderline left ventricular end-diastolic pressure at rest. Mild incerease in pulmonary artery pressure and pulmonary capillary wedge pressure with exercise. Mild, hemodynamically nonsignificant epicardial coronary artery disease.  HX NEPHRECTOMY  2/22/10    s/p partial right nephrectomy for right kidney lower pole mass secondary to early stage cancer    HX OTHER SURGICAL      Ovary removal    HX TRABECULECTOMY           Current Outpatient Rx   Name  Route  Sig  Dispense  Refill    losartan (COZAAR) 100 mg tablet    Oral    Take 100 mg by mouth daily.  aspirin delayed-release 81 mg tablet    Oral    Take  by mouth daily.               meloxicam (MOBIC) 15 mg tablet  LINACLOTIDE (LINZESS PO)    Oral    Take  by mouth.  atorvastatin (LIPITOR) 80 mg tablet    Oral    Take 1 Tab by mouth daily. 90 Tab    3      Cholecalciferol, Vitamin D3, 50,000 unit cap    Oral    Take 1 Cap by mouth every seven (7) days.  alendronate (FOSAMAX) 35 mg tablet    Oral    Take 35 mg by mouth every seven (7) days.  nitroglycerin (NITROLINGUAL) 400 mcg/spray spray    SubLINGual    1 spray by SubLINGual route every five (5) minutes as needed. 1 Bottle    1      mometasone-formoterol (DULERA) 100-5 mcg/actuation HFA inhaler    Inhalation    Take 2 Puffs by inhalation two (2) times a day.  nitroglycerin (NITROBID) 2 % ointment    Topical    Apply 1 Inch to affected area as needed.  FLUNISOLIDE (AEROBID IN)    Inhalation    Take 1 Puff by inhalation daily.  LACTOBACILLUS ACIDOPHILUS (PROBIOTIC PO)    Oral    Take 1 Tab by mouth daily.  PROPYLENE GLYCOL/ (SYSTANE OP)    Ophthalmic    Apply  to eye as needed.  ipratropium (ATROVENT) 0.03 % nasal spray    Both Nostrils    2 Sprays by Both Nostrils route every twelve (12) hours.  fluticasone (FLONASE) 50 mcg/actuation nasal spray    Both Nostrils    2 Sprays by Both Nostrils route nightly.  albuterol (PROVENTIL HFA, VENTOLIN HFA) 90 mcg/actuation inhaler    Inhalation    Take 2 Puffs by inhalation as needed.  Azelastine (ASTEPRO) 0.15 % (205.5 mcg) nasal spray    Both Nostrils    1 Spray by Both Nostrils route two (2) times a day.  CYANOCOBALAMIN, VITAMIN B-12, (VITAMIN B-12 IJ)    Injection    1,000 mcg by Injection route every thirty (30) days.  cetirizine (ZYRTEC) 10 mg tablet    Oral    Take 10 mg by mouth as needed.                    Allergies   Allergen Reactions    Vicodin [Hydrocodone-Acetaminophen] Nausea and Vomiting       Social   Social History Substance Use Topics    Smoking status: Never Smoker    Smokeless tobacco: Never Used    Alcohol use No         Family history: family history includes COPD in her mother; Cancer in her father; Heart Disease in her brother; Thyroid Disease in her mother. Review of Systems:   Constitutional: Negative for chills, fever, malaise/fatigue and weight loss. Respiratory: Negative for cough, hemoptysis, shortness of breath and wheezing. Cardiovascular: Positive for chest pain and palpitations. Negative for orthopnea and leg swelling. Gastrointestinal: Negative. Musculoskeletal: Positive for joint pain and myalgias. Negative for falls. Neurological: Negative for dizziness. Physical Exam:   The patient is an alert, oriented, well developed, well nourished 78 y.o. female who was in no acute distress at the time of my examination. Visit Vitals    /82    Pulse 80    Ht 5' 2\" (1.575 m)    Wt 53.5 kg (118 lb)    SpO2 97%    BMI 21.58 kg/m2        BP Readings from Last 3 Encounters:   01/30/18 140/82   01/25/18 174/79   10/20/17 112/64        Wt Readings from Last 3 Encounters:   01/30/18 53.5 kg (118 lb)   01/25/18 52.6 kg (116 lb)   10/20/17 53.5 kg (118 lb)       HEENT: Conjunctivae white, mucosa moist, no pallor or cyanosis. Neck: Supple without masses, tenderness or thyromegaly. No jugular venous distention. Carotid upstrokes are full bilaterally with bilateral bruits vs radiation of murmur to the neck. Cardiovascular: Chest is symmetrical with good excursion. [place] is not displaced. No lifts, heaves or thrills. There is a normal S1 and mildly decreased A2 of S2 with a grade II-III/VI harsh mid to late peaking JENNY along the LSB, with radiation to the base and right neck, without appreciable diastolic murmur, rubs, clicks or gallops. Lungs: Clear to auscultation in all fields. Abdomen: Soft. No masses, tenderness or organomegaly.    Extremities: No edema, with full peripheral pulses. Review of Data: Please refer to past medical history for most recent cardiac testing. Lab Results   Component Value Date/Time    Cholesterol, total 160 02/21/2015 02:02 AM    HDL Cholesterol 73 02/21/2015 02:02 AM    LDL, calculated 76.6 02/21/2015 02:02 AM    Triglyceride 52 02/21/2015 02:02 AM    CHOL/HDL Ratio 2.2 02/21/2015 02:02 AM       Results for orders placed or performed in visit on 01/30/18   AMB POC EKG ROUTINE W/ 12 LEADS, INTER & REP     Status: None    Narrative    Normal sinus rhythm rate 80. There is some early R-wave transition anteriorly and some mild nonspecific ST-T changes inferolaterally in the high lateral leads. This EKG is similar to the EKG of July 28, 2017. Gabrielle Elias D.O., F.A.C.C. [facility]  [facility]  [address] [place] [address]. [address]    PLEASE NOTE:  This document has been produced using voice recognition software. Unrecognized errors in transcription may be present.

## 2024-10-30 ENCOUNTER — TELEPHONE (OUTPATIENT)
Age: 84
End: 2024-10-30

## (undated) DEVICE — NDL SPNE MANAN 22GX6IN --

## (undated) DEVICE — BANDAGE ADH W0.75XL3IN UNIV WVN FAB NAT GEN USE STRP N ADH

## (undated) DEVICE — SYR 10ML LUER LOK 1/5ML GRAD --

## (undated) DEVICE — MEDIA CONTRAST 10ML 200MG/ML 41%

## (undated) DEVICE — TRAY MYEL SFTY +